# Patient Record
Sex: FEMALE | Race: WHITE | NOT HISPANIC OR LATINO
[De-identification: names, ages, dates, MRNs, and addresses within clinical notes are randomized per-mention and may not be internally consistent; named-entity substitution may affect disease eponyms.]

---

## 2017-03-20 ENCOUNTER — RX RENEWAL (OUTPATIENT)
Age: 82
End: 2017-03-20

## 2017-04-20 ENCOUNTER — APPOINTMENT (OUTPATIENT)
Dept: INTERNAL MEDICINE | Facility: CLINIC | Age: 82
End: 2017-04-20

## 2017-04-20 VITALS
OXYGEN SATURATION: 98 % | WEIGHT: 96 LBS | HEIGHT: 60 IN | DIASTOLIC BLOOD PRESSURE: 80 MMHG | BODY MASS INDEX: 18.85 KG/M2 | SYSTOLIC BLOOD PRESSURE: 130 MMHG | HEART RATE: 79 BPM

## 2017-04-20 DIAGNOSIS — H61.21 IMPACTED CERUMEN, RIGHT EAR: ICD-10-CM

## 2017-04-20 DIAGNOSIS — Z89.422 ACQUIRED ABSENCE OF OTHER LEFT TOE(S): ICD-10-CM

## 2017-04-21 ENCOUNTER — MEDICATION RENEWAL (OUTPATIENT)
Age: 82
End: 2017-04-21

## 2017-04-21 LAB
25(OH)D3 SERPL-MCNC: 12.3 NG/ML
ALBUMIN SERPL ELPH-MCNC: 4 G/DL
ALP BLD-CCNC: 145 U/L
ALT SERPL-CCNC: 30 U/L
ANION GAP SERPL CALC-SCNC: 12 MMOL/L
AST SERPL-CCNC: 29 U/L
BILIRUB SERPL-MCNC: 0.4 MG/DL
BUN SERPL-MCNC: 24 MG/DL
CALCIUM SERPL-MCNC: 9.7 MG/DL
CHLORIDE SERPL-SCNC: 98 MMOL/L
CHOLEST SERPL-MCNC: 133 MG/DL
CHOLEST/HDLC SERPL: 1.5 RATIO
CO2 SERPL-SCNC: 25 MMOL/L
CREAT SERPL-MCNC: 0.7 MG/DL
GLUCOSE SERPL-MCNC: 95 MG/DL
HDLC SERPL-MCNC: 88 MG/DL
LDLC SERPL CALC-MCNC: 34 MG/DL
POTASSIUM SERPL-SCNC: 4.5 MMOL/L
PROT SERPL-MCNC: 7.5 G/DL
SODIUM SERPL-SCNC: 135 MMOL/L
TRIGL SERPL-MCNC: 53 MG/DL

## 2017-06-05 ENCOUNTER — APPOINTMENT (OUTPATIENT)
Dept: PHYSICAL MEDICINE AND REHAB | Facility: CLINIC | Age: 82
End: 2017-06-05

## 2017-06-05 DIAGNOSIS — M48.06 SPINAL STENOSIS, LUMBAR REGION: ICD-10-CM

## 2017-10-19 ENCOUNTER — APPOINTMENT (OUTPATIENT)
Dept: INTERNAL MEDICINE | Facility: CLINIC | Age: 82
End: 2017-10-19
Payer: MEDICARE

## 2017-10-19 VITALS
HEIGHT: 60 IN | DIASTOLIC BLOOD PRESSURE: 60 MMHG | SYSTOLIC BLOOD PRESSURE: 114 MMHG | OXYGEN SATURATION: 96 % | HEART RATE: 76 BPM

## 2017-10-19 PROCEDURE — 90662 IIV NO PRSV INCREASED AG IM: CPT

## 2017-10-19 PROCEDURE — G0008: CPT

## 2017-10-19 PROCEDURE — 99214 OFFICE O/P EST MOD 30 MIN: CPT | Mod: 25

## 2017-10-20 LAB
25(OH)D3 SERPL-MCNC: 10 NG/ML
ALBUMIN SERPL ELPH-MCNC: 3.9 G/DL
ALP BLD-CCNC: 137 U/L
ALT SERPL-CCNC: 24 U/L
ANION GAP SERPL CALC-SCNC: 13 MMOL/L
AST SERPL-CCNC: 24 U/L
BILIRUB SERPL-MCNC: 0.4 MG/DL
BUN SERPL-MCNC: 25 MG/DL
CALCIUM SERPL-MCNC: 9.5 MG/DL
CHLORIDE SERPL-SCNC: 96 MMOL/L
CHOLEST SERPL-MCNC: 134 MG/DL
CHOLEST/HDLC SERPL: 1.5 RATIO
CO2 SERPL-SCNC: 23 MMOL/L
CREAT SERPL-MCNC: 0.69 MG/DL
GLUCOSE SERPL-MCNC: 100 MG/DL
HBA1C MFR BLD HPLC: 5.2 %
HDLC SERPL-MCNC: 91 MG/DL
LDLC SERPL CALC-MCNC: 32 MG/DL
POTASSIUM SERPL-SCNC: 4.1 MMOL/L
PROT SERPL-MCNC: 7.1 G/DL
SODIUM SERPL-SCNC: 132 MMOL/L
TRIGL SERPL-MCNC: 57 MG/DL

## 2018-04-19 ENCOUNTER — APPOINTMENT (OUTPATIENT)
Dept: INTERNAL MEDICINE | Facility: CLINIC | Age: 83
End: 2018-04-19
Payer: MEDICARE

## 2018-04-19 VITALS
DIASTOLIC BLOOD PRESSURE: 60 MMHG | SYSTOLIC BLOOD PRESSURE: 132 MMHG | HEIGHT: 60 IN | OXYGEN SATURATION: 96 % | HEART RATE: 71 BPM

## 2018-04-19 DIAGNOSIS — M21.372 FOOT DROP, RIGHT FOOT: ICD-10-CM

## 2018-04-19 DIAGNOSIS — M21.371 FOOT DROP, RIGHT FOOT: ICD-10-CM

## 2018-04-19 DIAGNOSIS — E78.00 PURE HYPERCHOLESTEROLEMIA, UNSPECIFIED: ICD-10-CM

## 2018-04-19 DIAGNOSIS — Z92.29 PERSONAL HISTORY OF OTHER DRUG THERAPY: ICD-10-CM

## 2018-04-19 DIAGNOSIS — M79.673 PAIN IN UNSPECIFIED FOOT: ICD-10-CM

## 2018-04-19 PROCEDURE — 99213 OFFICE O/P EST LOW 20 MIN: CPT | Mod: 25

## 2018-04-19 PROCEDURE — 36415 COLL VENOUS BLD VENIPUNCTURE: CPT

## 2018-04-20 ENCOUNTER — RX RENEWAL (OUTPATIENT)
Age: 83
End: 2018-04-20

## 2018-04-20 LAB
ALBUMIN SERPL ELPH-MCNC: 3.8 G/DL
ALP BLD-CCNC: 154 U/L
ALT SERPL-CCNC: 26 U/L
ANION GAP SERPL CALC-SCNC: 13 MMOL/L
AST SERPL-CCNC: 28 U/L
BILIRUB SERPL-MCNC: 0.6 MG/DL
BUN SERPL-MCNC: 21 MG/DL
CALCIUM SERPL-MCNC: 9.2 MG/DL
CHLORIDE SERPL-SCNC: 93 MMOL/L
CHOLEST SERPL-MCNC: 148 MG/DL
CHOLEST/HDLC SERPL: 1.6 RATIO
CO2 SERPL-SCNC: 26 MMOL/L
CREAT SERPL-MCNC: 0.69 MG/DL
GLUCOSE SERPL-MCNC: 103 MG/DL
HDLC SERPL-MCNC: 92 MG/DL
LDLC SERPL CALC-MCNC: 45 MG/DL
POTASSIUM SERPL-SCNC: 4.4 MMOL/L
PROT SERPL-MCNC: 7.3 G/DL
SODIUM SERPL-SCNC: 132 MMOL/L
TRIGL SERPL-MCNC: 56 MG/DL

## 2018-05-08 ENCOUNTER — EMERGENCY (EMERGENCY)
Facility: HOSPITAL | Age: 83
LOS: 1 days | Discharge: HOME | End: 2018-05-08
Attending: EMERGENCY MEDICINE
Payer: MEDICARE

## 2018-05-08 VITALS
SYSTOLIC BLOOD PRESSURE: 130 MMHG | HEART RATE: 77 BPM | OXYGEN SATURATION: 96 % | TEMPERATURE: 98 F | RESPIRATION RATE: 16 BRPM | DIASTOLIC BLOOD PRESSURE: 65 MMHG

## 2018-05-08 VITALS
RESPIRATION RATE: 16 BRPM | OXYGEN SATURATION: 98 % | HEART RATE: 84 BPM | SYSTOLIC BLOOD PRESSURE: 156 MMHG | DIASTOLIC BLOOD PRESSURE: 74 MMHG

## 2018-05-08 PROCEDURE — 90471 IMMUNIZATION ADMIN: CPT

## 2018-05-08 PROCEDURE — 72125 CT NECK SPINE W/O DYE: CPT

## 2018-05-08 PROCEDURE — 70450 CT HEAD/BRAIN W/O DYE: CPT | Mod: 26

## 2018-05-08 PROCEDURE — 90715 TDAP VACCINE 7 YRS/> IM: CPT

## 2018-05-08 PROCEDURE — 99284 EMERGENCY DEPT VISIT MOD MDM: CPT | Mod: 25

## 2018-05-08 PROCEDURE — 99284 EMERGENCY DEPT VISIT MOD MDM: CPT | Mod: 25,GC

## 2018-05-08 PROCEDURE — 12002 RPR S/N/AX/GEN/TRNK2.6-7.5CM: CPT

## 2018-05-08 PROCEDURE — 72125 CT NECK SPINE W/O DYE: CPT | Mod: 26

## 2018-05-08 PROCEDURE — 70450 CT HEAD/BRAIN W/O DYE: CPT

## 2018-05-08 PROCEDURE — 12002 RPR S/N/AX/GEN/TRNK2.6-7.5CM: CPT | Mod: GC

## 2018-05-08 RX ORDER — TETANUS TOXOID, REDUCED DIPHTHERIA TOXOID AND ACELLULAR PERTUSSIS VACCINE, ADSORBED 5; 2.5; 8; 8; 2.5 [IU]/.5ML; [IU]/.5ML; UG/.5ML; UG/.5ML; UG/.5ML
0.5 SUSPENSION INTRAMUSCULAR ONCE
Qty: 0 | Refills: 0 | Status: COMPLETED | OUTPATIENT
Start: 2018-05-08 | End: 2018-05-08

## 2018-05-08 RX ADMIN — TETANUS TOXOID, REDUCED DIPHTHERIA TOXOID AND ACELLULAR PERTUSSIS VACCINE, ADSORBED 0.5 MILLILITER(S): 5; 2.5; 8; 8; 2.5 SUSPENSION INTRAMUSCULAR at 13:26

## 2018-05-08 NOTE — ED PROCEDURE NOTE - ATTENDING CONTRIBUTION TO CARE
***Binh Prado MD*** Attending physician was available for the key components of the procedure, the patient tolerated well. There were no complications with the procedure.

## 2018-05-08 NOTE — ED ADULT NURSE NOTE - PMH
Cataract    Cerebrovascular Accident (Stroke)    Hip fracture    HTN (Hypertension)    Wrist fracture

## 2018-05-08 NOTE — ED PROVIDER NOTE - MEDICAL DECISION MAKING DETAILS
89F p/w mechanical fall. no concern for syncopal episode. only c/o headache and laceration. unknown last tdap. will update, scan head and neck and lac repair. 89F p/w mechanical fall. no concern for syncopal episode. only c/o headache and laceration. unknown last tdap. will update, scan head and neck and lac repair.    Dr. Moser: Female patient with past hx of HTN, brought to ED due to head trauma following mechanical fall without associated LOC, resulting in occipital scalp laceration. Patient found in no acute distress, calm, speaking in complete sentences, with no gross neurologic deficits. Imaging studies of head and neck ordered to assess for fractures/dislocations, intracranial bleed. CT's revealed no emergent findings. Patient lacerations were stapled successfully at ED, with patient tolerating treatment well. Tetanus immunization to be administered at ED. Recommendations for rest, proper wound hygiene with soap and water, return to ED or follow-up with PMD in 7-10 days for wound recheck ans suture removal were given to patient. Will discharge home.

## 2018-05-08 NOTE — ED ADULT NURSE NOTE - OBJECTIVE STATEMENT
89 year old female presents to the ED via EMS complaining of a 2cm laceration to the back of the head s/p a fall at home. As per patient she got up, trunked too fast and fell backwards hitting her head on the carpet. The patient arrived with her head wrapped by EMS. Pt denies LOC, is not on blood thinners. Pt is A&O x 2, oriented to situation and day, disoriented to year and president. Patient states that she remembers everything, denies nausea or visual changes. on neurological assessment the pt is neurologically and neurosensory intact, strong use of all extremities, pt denies neck pain. IV placed, labs drawn, bed in low position, safety measures in place.

## 2018-05-08 NOTE — ED PROVIDER NOTE - OBJECTIVE STATEMENT
Monique Gaston M.D: 89F hx htn BIBEMS following mechanical fall. got up from sitting and turned around and lost her balance and fell backwards. no LOC. able to get up afterwards and walk. no other injuries other than alceration to scalp. no cp sob dizziness lightheadedness Monique Gaston M.D: 89F hx htn BIBEMS following mechanical fall. got up from sitting and turned around and lost her balance and fell backwards. no LOC. able to get up afterwards and walk. no other injuries other than alceration to scalp. no cp sob dizziness lightheadedness. feels well other than small headache.

## 2018-05-08 NOTE — ED PROVIDER NOTE - PROGRESS NOTE DETAILS
Patient evaluation and care begun prior to documentation. Patient was evaluated by me, found in no acute distress, calm, speaking in complete sentences. CT's revealed no emergent findings. Patient lacerations were stapled successfully at ED, with patient tolerating treatment well. Tetanus immunization to be administered at ED. Recommendations for rest, proper wound hygiene with soap and water, return to ED or follow-up with PMD in 7-10 days for wound recheck ans suture removal were given to patient. Will discharge home. I agree with resident assessment and plan. -Dr. Binh Moser

## 2018-05-08 NOTE — ED PROVIDER NOTE - PHYSICAL EXAMINATION
Monique Gaston M.D.:   patient awake alert NAD .   LUNGS CTAB no wheeze no crackle.   CARD RRR no m/r/g.    Abdomen soft NT ND no rebound no guarding no CVA tenderness.   EXT WWP no edema no calf tenderness CV 2+DP/PT bilaterally.   neuro A&Ox3 gait normal.    skin warm and dry no rash 2 cm linear laceration to posterior occiput still bleeding.  HEENT: moist mucous membranes, PERRL, EOMI

## 2018-05-17 ENCOUNTER — EMERGENCY (EMERGENCY)
Facility: HOSPITAL | Age: 83
LOS: 1 days | Discharge: HOME | End: 2018-05-17
Attending: EMERGENCY MEDICINE
Payer: MEDICARE

## 2018-05-17 VITALS
SYSTOLIC BLOOD PRESSURE: 146 MMHG | RESPIRATION RATE: 16 BRPM | DIASTOLIC BLOOD PRESSURE: 78 MMHG | HEIGHT: 56 IN | WEIGHT: 104.94 LBS | OXYGEN SATURATION: 97 % | TEMPERATURE: 98 F | HEART RATE: 78 BPM

## 2018-05-17 PROCEDURE — G0463: CPT

## 2018-05-17 NOTE — ED PROVIDER NOTE - OBJECTIVE STATEMENT
89 y/o female presents 8 days s/p staple placement at [facility] ED in scalp for lac after fall and head trauma. Pt denies pain, pruritus, erythema, bleeding, DC, or irritation of the staple site. Pt has no complaints today, denies pain, dizziness, confusion, AMS, change in vision, recent fevers, chills, or other recent illness.

## 2018-05-17 NOTE — ED PROVIDER NOTE - CHIEF COMPLAINT
The patient is a 89y Female complaining of The patient is a 89y Female complaining of need for staple removal

## 2018-05-17 NOTE — ED PROVIDER NOTE - PHYSICAL EXAMINATION
Pt in NAD, A&O x3. CN 2-12 grossly intact. 7 staples present in scalp with underlying scab, no discharge, erythema, edema, or active bleeding, minimally tender to palpation over staple site. Otherwise NCAT. R with light blue iris and cloudy appearing cornea, non-tender, unable to see out of R eye pt states no change from baseline c/w prior corneal replacement surgery, L eye PERRLA, EOMI b/l. Nares patent, turbinates without edema or erythema, no polyps or septal deviation. No auricular tenderness. Mouth and pharynx without erythema or exudates, uvula midline, no tonsillar enlargement. Trachea midline, no lymphadenopathy, no obvious JVD. No retractions or chest wall deformities. No chest wall tenderness, CTA anterior and posterior b/l, no wheezes, rales, or rhonchi. Clear distinct S1, S2, regular rate, no S3, S4, murmurs, gallops, or rubs. Abdomen non-tender. No CVAT b/l. No edema or tenderness of the lower extremities.

## 2018-05-17 NOTE — ED PROVIDER NOTE - ATTENDING CONTRIBUTION TO CARE
89y feamale sp mechanical trip and fall 8d ago sustained scalp lac and now pw req for sr, No ha,nvdc.cp,sob PE WDWN female looking stated age healing wound/post scalp. nontender with #7 staples, Neuro gcs 15 awake alert moving all extr  Arnulfo Castellano MD, Facep

## 2018-05-17 NOTE — ED PROVIDER NOTE - PLAN OF CARE
Follow-up with your primary care provider or return to ED if any questions arise.  Continue to take all medications as directed.  Gently wash area with soap and warm water.  Return to the emergency room immediately if affected area becomes painful or irritated, if there is discharge or increased bleeding from the area, or if you develop fever, chills, headache, nausea, vomiting, change in vision, numbness or tingling, difficulty walking, talking, eating or swallowing, chest pain, back pain, or if any other concerning or questionable symptoms.

## 2018-05-17 NOTE — ED PROCEDURE NOTE - ATTENDING CONTRIBUTION TO CARE
Staples removed without difficulty, Wound no sign of infection.Pt tolerated procedure well  Arnulfo Castellano MD, Facep

## 2018-05-17 NOTE — ED ADULT NURSE NOTE - OBJECTIVE STATEMENT
0945 89 yr old WF here for staple removal scalp. No c/o pain dizziness or HA. home health aide with pt. Pt A&Ox4. Fall risk precautions maintained 0945 89 yr old WF here for staple removal scalp. No c/o pain dizziness or HA. home health aide with pt. Pt A&Ox3. not sure of year. Fall risk precautions maintained. [place] removed by  Healing ecchymosis right periorbital region

## 2018-05-17 NOTE — ED PROVIDER NOTE - CARE PLAN
Principal Discharge DX:	Removal of staples  Assessment and plan of treatment:	Follow-up with your primary care provider or return to ED if any questions arise.  Continue to take all medications as directed.  Gently wash area with soap and warm water.  Return to the emergency room immediately if affected area becomes painful or irritated, if there is discharge or increased bleeding from the area, or if you develop fever, chills, headache, nausea, vomiting, change in vision, numbness or tingling, difficulty walking, talking, eating or swallowing, chest pain, back pain, or if any other concerning or questionable symptoms.

## 2018-08-27 ENCOUNTER — EMERGENCY (EMERGENCY)
Facility: HOSPITAL | Age: 83
LOS: 1 days | Discharge: HOME | End: 2018-08-27
Attending: EMERGENCY MEDICINE
Payer: MEDICARE

## 2018-08-27 VITALS
SYSTOLIC BLOOD PRESSURE: 131 MMHG | WEIGHT: 115.08 LBS | TEMPERATURE: 99 F | OXYGEN SATURATION: 95 % | RESPIRATION RATE: 18 BRPM | HEART RATE: 78 BPM | HEIGHT: 60 IN | DIASTOLIC BLOOD PRESSURE: 74 MMHG

## 2018-08-27 VITALS
HEART RATE: 65 BPM | RESPIRATION RATE: 18 BRPM | OXYGEN SATURATION: 94 % | DIASTOLIC BLOOD PRESSURE: 80 MMHG | TEMPERATURE: 98 F | SYSTOLIC BLOOD PRESSURE: 157 MMHG

## 2018-08-27 DIAGNOSIS — M26.609 UNSPECIFIED TEMPOROMANDIBULAR JOINT DISORDER, UNSPECIFIED SIDE: ICD-10-CM

## 2018-08-27 LAB
ALBUMIN SERPL ELPH-MCNC: 4.2 G/DL — SIGNIFICANT CHANGE UP (ref 3.3–5)
ALP SERPL-CCNC: 153 U/L — HIGH (ref 40–120)
ALT FLD-CCNC: 15 U/L — SIGNIFICANT CHANGE UP (ref 10–45)
ANION GAP SERPL CALC-SCNC: 12 MMOL/L — SIGNIFICANT CHANGE UP (ref 5–17)
AST SERPL-CCNC: 17 U/L — SIGNIFICANT CHANGE UP (ref 10–40)
BASOPHILS # BLD AUTO: 0 K/UL — SIGNIFICANT CHANGE UP (ref 0–0.2)
BASOPHILS NFR BLD AUTO: 0.2 % — SIGNIFICANT CHANGE UP (ref 0–2)
BILIRUB SERPL-MCNC: 0.6 MG/DL — SIGNIFICANT CHANGE UP (ref 0.2–1.2)
BUN SERPL-MCNC: 19 MG/DL — SIGNIFICANT CHANGE UP (ref 7–23)
CALCIUM SERPL-MCNC: 9.4 MG/DL — SIGNIFICANT CHANGE UP (ref 8.4–10.5)
CHLORIDE SERPL-SCNC: 92 MMOL/L — LOW (ref 96–108)
CO2 SERPL-SCNC: 28 MMOL/L — SIGNIFICANT CHANGE UP (ref 22–31)
CREAT SERPL-MCNC: 0.62 MG/DL — SIGNIFICANT CHANGE UP (ref 0.5–1.3)
EOSINOPHIL # BLD AUTO: 0.1 K/UL — SIGNIFICANT CHANGE UP (ref 0–0.5)
EOSINOPHIL NFR BLD AUTO: 0.5 % — SIGNIFICANT CHANGE UP (ref 0–6)
ERYTHROCYTE [SEDIMENTATION RATE] IN BLOOD: 17 MM/HR — SIGNIFICANT CHANGE UP (ref 0–20)
GLUCOSE SERPL-MCNC: 112 MG/DL — HIGH (ref 70–99)
HCT VFR BLD CALC: 44 % — SIGNIFICANT CHANGE UP (ref 34.5–45)
HGB BLD-MCNC: 14.1 G/DL — SIGNIFICANT CHANGE UP (ref 11.5–15.5)
LYMPHOCYTES # BLD AUTO: 0.9 K/UL — LOW (ref 1–3.3)
LYMPHOCYTES # BLD AUTO: 9.8 % — LOW (ref 13–44)
MCHC RBC-ENTMCNC: 30 PG — SIGNIFICANT CHANGE UP (ref 27–34)
MCHC RBC-ENTMCNC: 32.1 GM/DL — SIGNIFICANT CHANGE UP (ref 32–36)
MCV RBC AUTO: 93.3 FL — SIGNIFICANT CHANGE UP (ref 80–100)
MONOCYTES # BLD AUTO: 1.2 K/UL — HIGH (ref 0–0.9)
MONOCYTES NFR BLD AUTO: 12.9 % — SIGNIFICANT CHANGE UP (ref 2–14)
NEUTROPHILS # BLD AUTO: 7.3 K/UL — SIGNIFICANT CHANGE UP (ref 1.8–7.4)
NEUTROPHILS NFR BLD AUTO: 76.5 % — SIGNIFICANT CHANGE UP (ref 43–77)
PLATELET # BLD AUTO: 151 K/UL — SIGNIFICANT CHANGE UP (ref 150–400)
POTASSIUM SERPL-MCNC: 4.1 MMOL/L — SIGNIFICANT CHANGE UP (ref 3.5–5.3)
POTASSIUM SERPL-SCNC: 4.1 MMOL/L — SIGNIFICANT CHANGE UP (ref 3.5–5.3)
PROT SERPL-MCNC: 7.8 G/DL — SIGNIFICANT CHANGE UP (ref 6–8.3)
RBC # BLD: 4.71 M/UL — SIGNIFICANT CHANGE UP (ref 3.8–5.2)
RBC # FLD: 13.3 % — SIGNIFICANT CHANGE UP (ref 10.3–14.5)
SODIUM SERPL-SCNC: 132 MMOL/L — LOW (ref 135–145)
WBC # BLD: 9.5 K/UL — SIGNIFICANT CHANGE UP (ref 3.8–10.5)
WBC # FLD AUTO: 9.5 K/UL — SIGNIFICANT CHANGE UP (ref 3.8–10.5)

## 2018-08-27 PROCEDURE — 85652 RBC SED RATE AUTOMATED: CPT

## 2018-08-27 PROCEDURE — 99284 EMERGENCY DEPT VISIT MOD MDM: CPT

## 2018-08-27 PROCEDURE — 70491 CT SOFT TISSUE NECK W/DYE: CPT | Mod: 26

## 2018-08-27 PROCEDURE — 70491 CT SOFT TISSUE NECK W/DYE: CPT

## 2018-08-27 PROCEDURE — 99284 EMERGENCY DEPT VISIT MOD MDM: CPT | Mod: 25

## 2018-08-27 PROCEDURE — 85027 COMPLETE CBC AUTOMATED: CPT

## 2018-08-27 PROCEDURE — 80053 COMPREHEN METABOLIC PANEL: CPT

## 2018-08-27 RX ORDER — ACETAMINOPHEN 500 MG
975 TABLET ORAL ONCE
Qty: 0 | Refills: 0 | Status: COMPLETED | OUTPATIENT
Start: 2018-08-27 | End: 2018-08-27

## 2018-08-27 RX ORDER — SODIUM CHLORIDE 9 MG/ML
1000 INJECTION, SOLUTION INTRAVENOUS
Qty: 0 | Refills: 0 | Status: DISCONTINUED | OUTPATIENT
Start: 2018-08-27 | End: 2018-08-31

## 2018-08-27 RX ADMIN — Medication 975 MILLIGRAM(S): at 11:35

## 2018-08-27 RX ADMIN — Medication 975 MILLIGRAM(S): at 10:47

## 2018-08-27 NOTE — ED PROVIDER NOTE - PROGRESS NOTE DETAILS
Attending MD Short: pt seen by ENT attending Dr. Oneil, she does not suspect septic arthritis. ?inflammatory arthropathy involving TMJ. She recommends warm compresses, PO analgesics and outpatient oral surgery follow up

## 2018-08-27 NOTE — ED ADULT NURSE NOTE - OBJECTIVE STATEMENT
89 y/o female presents to ED from home c/o L sided facial pain for the past year. Patient's daughter reports patient states pain has been constant, and since today she is unable to chew her food. Denies fever/chills, falls/LOC, SOB, cp, n/v/d, recent travel. Patient A&Ox4, breathing spontaneously, airway patent, b/l clear lungs, abdomen nontender, +pulses, cap refill <2 seconds. Patient resting in bed, side rails up, plan of care explained.

## 2018-08-27 NOTE — CONSULT NOTE ADULT - ASSESSMENT
89 yo F c/o pain in the left side of her face for the past year on and off, worsening since yesterday. Neck Ct showed a fluid collection at the TMJ. No abscess. Ear exam is normal. ESR and WBC nl. Unlikely infectious

## 2018-08-27 NOTE — ED PROVIDER NOTE - OBJECTIVE STATEMENT
89 yo F c/o pain in the left side of her face for the past year. Reports the pain as being constant and was unable to chew her food today. Denies any fevers. Might be allergic to penicillin. Pt's daughter reports her stomach is sensitive to medication and often gets diarrhea.     Dr. Raj Gonzales (Internist) [address] [place] [address] Dr Cruz [address] [place] [address]  [phone] 89 yo F c/o pain in the left side of her face for the past year. Reports the pain as being constant and was unable to chew her food today. Denies any fevers. Might be allergic to penicillin. Pt's daughter reports her stomach is sensitive to medication and often gets diarrhea. On exam, pt was A&Ox3 and hard of hearing. She has grayish discoloration of her R eye, her L eye was equal round and reactive to light. She has a symmetric smile, and localized tenderness of her preauricular area, no mastoid tenderness BL, no Bl cerumen tenderness, no odontogenic abscess, no trismus, neck is supple and non-tender. Her heart, lungs, abd, neuro, and skin exam findings are all normal.     Dr. Raj Gonzales (Internist) [address] [place] [address] Nancy [address] [place] [id number]  [phone]

## 2018-08-27 NOTE — ED PROVIDER NOTE - PLAN OF CARE
You may use Tylenol 650mg every 8 hours as needed for pain. You should apply a warm towel to the area 4 times per day for 20 minutes to help with the swelling.     Please call  to schedule a follow up appointment with Dr. Hopper our specialist in 1-2 weeks.    Return immediately to the ED for worsening pain, fevers (temperature over 101F) or for any other concerns.

## 2018-08-27 NOTE — ED PROVIDER NOTE - PHYSICAL EXAMINATION
focal localized tenderness and mild swelling of preauricular area on left, no mastoid tenderness BL, no trismus. no tongue elevation, uvula midline, nontender teeth

## 2018-08-27 NOTE — ED PROVIDER NOTE - MEDICAL DECISION MAKING DETAILS
Attending MD Short: 89F with acute on chronic left jaw and facial pain x 4-5 months, exam is notable for localizing mild swelling and ttp in pre-auricular area. Ddx includes parotitis from sialoadenitis, parotid cyst/mass, trigeminal neuralgia. Less likely temporal arteritis as pain is not localized to temporal area and no temporal ttp however will obtain ESR to eval. CT neck to ro parotid collection or mass.

## 2018-08-27 NOTE — CONSULT NOTE ADULT - ATTENDING COMMENTS
left TMJ pain with joint effusion. low suspicion for septic arthritis given the chronicity of symptoms, lack of wbc, normal esr, and lack of systemic symptoms. significant improvement with tylenol and patient with normal range of motion on jaw opening. recommend f/u with oral surgeon this week to eval further. if pain worsens, any fever/chills/systemic symptoms, inability to chew, increase in redness/swelling, then f/u in ED immediately. d/w daughter and patient.

## 2018-08-27 NOTE — CONSULT NOTE ADULT - SUBJECTIVE AND OBJECTIVE BOX
CC: Left facial pain    HPI: 89 yo F c/o pain in the left side of her face for the past year on and off. Reports the pain as being constant and was unable to chew her food today. Pt states the pain radiates to her left ear. Pt received tylenol in the ER and felt much better after. Pt admits to B/L hearing loss, but denies ear discharge, SOB, fevers, HAs.      PAST MEDICAL & SURGICAL HISTORY:  Wrist fracture  Hip fracture  HTN (Hypertension)  Cerebrovascular Accident (Stroke)  Cataract  Status Post Small Bowel Resection  C Section  S/P Exploratory Laparotomy    Allergies    No Known Allergies    Intolerances    penicillin (Stomach Upset)    MEDICATIONS  (STANDING):  lactated ringers. 1000 milliLiter(s) (70 mL/Hr) IV Continuous <Continuous>    MEDICATIONS  (PRN):      Social History:     Family history:     ROS:   ENT: all negative except as noted in HPI   CV: denies palpitations  Pulm: denies SOB, cough, hemoptysis  GI: denies change in apetite, indigestion, n/v  : denies pertinent urinary symptoms, urgency  Neuro: denies numbness/tingling, loss of sensation  Psych: denies anxiety  MS: denies muscle weakness, instability  Heme: denies easy bruising or bleeding  Endo: denies heat/cold intolerance, excessive sweating  Vascular: denies LE edema    Vital Signs Last 24 Hrs  T(C): 36.7 (27 Aug 2018 16:46), Max: 37 (27 Aug 2018 10:12)  T(F): 98 (27 Aug 2018 16:46), Max: 98.6 (27 Aug 2018 10:12)  HR: 65 (27 Aug 2018 16:46) (65 - 78)  BP: 157/80 (27 Aug 2018 16:46) (131/74 - 164/70)  BP(mean): --  RR: 18 (27 Aug 2018 16:46) (18 - 18)  SpO2: 94% (27 Aug 2018 16:46) (94% - 95%)                          14.1   9.5   )-----------( 151      ( 27 Aug 2018 10:59 )             44.0    08-27    132<L>  |  92<L>  |  19  ----------------------------<  112<H>  4.1   |  28  |  0.62    Ca    9.4      27 Aug 2018 10:59    TPro  7.8  /  Alb  4.2  /  TBili  0.6  /  DBili  x   /  AST  17  /  ALT  15  /  AlkPhos  153<H>  08-27       PHYSICAL EXAM:  Gen: NAD  Skin: No rashes, bruises, or lesions  Head: Normocephalic, Atraumatic  Face: Mild left pre-auricular swelling, TTP, erythematous, no fluctuance. Parotid glands soft without mass  Eyes: no scleral injection  Ears: Right - ear canal clear, TM intact without effusion or erythema. No evidence of any fluid drainage. No mastoid tenderness, erythema, or ear bulging            Left - ear canal clear, TM intact without effusion or erythema. No evidence of any fluid drainage. No mastoid tenderness, erythema, or ear bulging  Nose: Nares bilaterally patent, no discharge  Mouth: No Stridor / Drooling / Trismus.  Mucosa moist, tongue/uvula midline, oropharynx clear  Neck: Flat, supple, no lymphadenopathy, trachea midline, no masses  Lymphatic: No lymphadenopathy  Resp: breathing easily, no stridor  CV: no peripheral edema/cyanosis  GI: nondistended   Peripheral vascular: no JVD or edema  Neuro: facial nerve intact, no facial droop                    IMAGING/ADDITIONAL STUDIES: Nonspecific fluid is noted within the left temporal mandibular joint. The   left condylar head is subluxed anteriorly. Some considerations include a   joint effusion versus a septic arthritis. These changes appear new   compared to 05/08/2018 cervical spine CT study. CC: Left facial pain    HPI: 89 yo F c/o pain in the left side of her face for the past year on and off. Reports the pain as being constant and was unable to chew her food today. Pt states the pain radiates to her left ear. Pt received tylenol in the ER and felt much better after. Pt admits to B/L hearing loss, but denies ear discharge, SOB, fevers, HAs.      PAST MEDICAL & SURGICAL HISTORY:  Wrist fracture  Hip fracture  HTN (Hypertension)  Cerebrovascular Accident (Stroke)  Cataract  Status Post Small Bowel Resection  C Section  S/P Exploratory Laparotomy    Allergies    No Known Allergies    Intolerances    penicillin (Stomach Upset)    MEDICATIONS  (STANDING):  lactated ringers. 1000 milliLiter(s) (70 mL/Hr) IV Continuous <Continuous>    MEDICATIONS  (PRN):      Social History: Unknown    Family history: No pertinent Fx    ROS:   ENT: all negative except as noted in HPI   CV: denies palpitations  Pulm: denies SOB, cough, hemoptysis  GI: denies change in apetite, indigestion, n/v  : denies pertinent urinary symptoms, urgency  Neuro: denies numbness/tingling, loss of sensation  Psych: denies anxiety  MS: denies muscle weakness, instability  Heme: denies easy bruising or bleeding  Endo: denies heat/cold intolerance, excessive sweating  Vascular: denies LE edema    Vital Signs Last 24 Hrs  T(C): 36.7 (27 Aug 2018 16:46), Max: 37 (27 Aug 2018 10:12)  T(F): 98 (27 Aug 2018 16:46), Max: 98.6 (27 Aug 2018 10:12)  HR: 65 (27 Aug 2018 16:46) (65 - 78)  BP: 157/80 (27 Aug 2018 16:46) (131/74 - 164/70)  BP(mean): --  RR: 18 (27 Aug 2018 16:46) (18 - 18)  SpO2: 94% (27 Aug 2018 16:46) (94% - 95%)                          14.1   9.5   )-----------( 151      ( 27 Aug 2018 10:59 )             44.0    08-27    132<L>  |  92<L>  |  19  ----------------------------<  112<H>  4.1   |  28  |  0.62    Ca    9.4      27 Aug 2018 10:59    TPro  7.8  /  Alb  4.2  /  TBili  0.6  /  DBili  x   /  AST  17  /  ALT  15  /  AlkPhos  153<H>  08-27       PHYSICAL EXAM:  Gen: NAD  Skin: No rashes, bruises, or lesions  Head: Normocephalic, Atraumatic  Face: Mild left pre-auricular swelling, TTP, erythematous, no fluctuance. Parotid glands soft without mass  Eyes: no scleral injection  Ears: Right - ear canal clear, TM intact without effusion or erythema. No evidence of any fluid drainage. No mastoid tenderness, erythema, or ear bulging            Left - ear canal clear, TM intact without effusion or erythema. No evidence of any fluid drainage. No mastoid tenderness, erythema, or ear bulging  Nose: Nares bilaterally patent, no discharge  Mouth: No Stridor / Drooling / Trismus.  Mucosa moist, tongue/uvula midline, oropharynx clear  Neck: Flat, supple, no lymphadenopathy, trachea midline, no masses  Lymphatic: No lymphadenopathy  Resp: breathing easily, no stridor  CV: no peripheral edema/cyanosis  GI: nondistended   Peripheral vascular: no JVD or edema  Neuro: facial nerve intact, no facial droop                    IMAGING/ADDITIONAL STUDIES: Nonspecific fluid is noted within the left temporal mandibular joint. The   left condylar head is subluxed anteriorly. Some considerations include a   joint effusion versus a septic arthritis. These changes appear new   compared to 05/08/2018 cervical spine CT study. CC: Left facial pain    HPI: 89 yo F c/o pain in the left side of her face for the past year on and off. Reports the pain as being constant and was unable to chew her food today. Pt states the pain radiates to her left ear. Pt received tylenol in the ER and felt much better after. Pt admits to B/L hearing loss, but denies ear discharge, SOB, fevers, HAs. Notes the pain has worsened the past few months. SHe did not try any pain relievers at home since she wasnt sure why it was hurting. no other modifying factors.    PAST MEDICAL & SURGICAL HISTORY:  Wrist fracture  Hip fracture  HTN (Hypertension)  Cerebrovascular Accident (Stroke)  Cataract  Status Post Small Bowel Resection  C Section  S/P Exploratory Laparotomy    Allergies    No Known Allergies    Intolerances    penicillin (Stomach Upset)    MEDICATIONS  (STANDING):  lactated ringers. 1000 milliLiter(s) (70 mL/Hr) IV Continuous <Continuous>    MEDICATIONS  (PRN):      Social History: no tobacco, no etoh    Family history: No pertinent Fx    ROS:   ENT: all negative except as noted in HPI   CV: denies palpitations  Pulm: denies SOB, cough, hemoptysis  GI: denies change in apetite, indigestion, n/v  : denies pertinent urinary symptoms, urgency  Neuro: denies numbness/tingling, loss of sensation  Psych: denies anxiety  MS: denies muscle weakness, instability  Heme: denies easy bruising or bleeding  Endo: denies heat/cold intolerance, excessive sweating  Vascular: denies LE edema    Vital Signs Last 24 Hrs  T(C): 36.7 (27 Aug 2018 16:46), Max: 37 (27 Aug 2018 10:12)  T(F): 98 (27 Aug 2018 16:46), Max: 98.6 (27 Aug 2018 10:12)  HR: 65 (27 Aug 2018 16:46) (65 - 78)  BP: 157/80 (27 Aug 2018 16:46) (131/74 - 164/70)  BP(mean): --  RR: 18 (27 Aug 2018 16:46) (18 - 18)  SpO2: 94% (27 Aug 2018 16:46) (94% - 95%)                          14.1   9.5   )-----------( 151      ( 27 Aug 2018 10:59 )             44.0    08-27    132<L>  |  92<L>  |  19  ----------------------------<  112<H>  4.1   |  28  |  0.62    Ca    9.4      27 Aug 2018 10:59    TPro  7.8  /  Alb  4.2  /  TBili  0.6  /  DBili  x   /  AST  17  /  ALT  15  /  AlkPhos  153<H>  08-27       PHYSICAL EXAM:  Gen: NAD  Skin: No rashes, bruises, or lesions  Head: Normocephalic, Atraumatic  Face: Mild left pre-auricular swelling with faint erythema, TTP over joint, no fluctuance. Parotid glands soft without tenderness  Eyes: no scleral injection  Ears: Right - ear canal clear, TM intact without effusion or erythema. No evidence of any fluid drainage. No mastoid tenderness, erythema, or ear bulging            Left - ear canal clear, TM intact without effusion or erythema. No evidence of any fluid drainage. No mastoid tenderness, erythema, or ear bulging  Nose: Nares bilaterally patent, no discharge  Mouth: No Stridor / Drooling / Trismus.  Mucosa moist, tongue/uvula midline, oropharynx clear  Neck: Flat, supple, no lymphadenopathy, trachea midline, no masses  Lymphatic: No lymphadenopathy  Resp: breathing easily, no stridor  CV: no peripheral edema/cyanosis  GI: nondistended   Peripheral vascular: no JVD or edema  Neuro: facial nerve intact, no facial droop              IMAGING/ADDITIONAL STUDIES: Nonspecific fluid is noted within the left temporal mandibular joint. The   left condylar head is subluxed anteriorly. Some considerations include a   joint effusion versus a septic arthritis. These changes appear new   compared to 05/08/2018 cervical spine CT study.

## 2018-08-27 NOTE — ED ADULT NURSE REASSESSMENT NOTE - NS ED NURSE REASSESS COMMENT FT1
Provided patient with warm compress and instructed patient to apply to L side of face. Patient states "I do not have pain", left warm pack with patient.

## 2018-08-27 NOTE — ED ADULT NURSE NOTE - NSIMPLEMENTINTERV_GEN_ALL_ED
Implemented All Fall with Harm Risk Interventions:  [place] to call system. Call bell, personal items and telephone within reach. Instruct patient to call for assistance. Room bathroom lighting operational. Non-slip footwear when patient is off stretcher. Physically safe environment: no spills, clutter or unnecessary equipment. Stretcher in lowest position, wheels locked, appropriate side rails in place. Provide visual cue, wrist band, yellow gown, etc. Monitor gait and stability. Monitor for mental status changes and reorient to person, place, and time. Review medications for side effects contributing to fall risk. Reinforce activity limits and safety measures with patient and family. Provide visual clues: red socks.

## 2018-08-27 NOTE — ED PROVIDER NOTE - ENMT, MLM
Hard of hearing, symmetric smile, Neck is supple and non-tender. No cerumen BL, no odontogenic abscess, no trismus. Hard of hearing, symmetric smile, Neck is supple and non-tender. cerumen impaction bilaterally, unable to visualize Tm b/l, no odontogenic abscess, no trismus.

## 2018-10-01 ENCOUNTER — MEDICATION RENEWAL (OUTPATIENT)
Age: 83
End: 2018-10-01

## 2018-10-01 ENCOUNTER — RX RENEWAL (OUTPATIENT)
Age: 83
End: 2018-10-01

## 2018-10-25 ENCOUNTER — APPOINTMENT (OUTPATIENT)
Dept: INTERNAL MEDICINE | Facility: CLINIC | Age: 83
End: 2018-10-25
Payer: MEDICARE

## 2018-10-25 VITALS
SYSTOLIC BLOOD PRESSURE: 110 MMHG | OXYGEN SATURATION: 97 % | HEART RATE: 72 BPM | DIASTOLIC BLOOD PRESSURE: 70 MMHG | WEIGHT: 96 LBS | BODY MASS INDEX: 18.85 KG/M2 | HEIGHT: 60 IN

## 2018-10-25 PROCEDURE — 90662 IIV NO PRSV INCREASED AG IM: CPT

## 2018-10-25 PROCEDURE — G0008: CPT

## 2018-10-25 PROCEDURE — 99214 OFFICE O/P EST MOD 30 MIN: CPT | Mod: 25

## 2018-10-25 NOTE — ASSESSMENT
[FreeTextEntry1] : \par Flu shot today.\par \par Cholesterol stable, well controlled, on statin. Due for repeat lipid and CMP check today with labs.

## 2018-10-25 NOTE — REASON FOR VISIT
[Follow-Up] : a follow-up visit [Pre-Visit Preparation] : pre-visit preparation was done [Intercurrent Specialty/Sub-specialty Visits] : the patient has intercurrent specialty/sub-specialty visits [FreeTextEntry3] : PM&R

## 2018-10-25 NOTE — PHYSICAL EXAM
[General Appearance - Alert] : alert [General Appearance - In No Acute Distress] : in no acute distress [Auscultation Breath Sounds / Voice Sounds] : lungs were clear to auscultation bilaterally [Heart Rate And Rhythm] : heart rate was normal and rhythm regular [Heart Sounds] : normal S1 and S2 [Heart Sounds Gallop] : no gallops [Murmurs] : no murmurs [Heart Sounds Pericardial Friction Rub] : no pericardial rub [Full Pulse] : the pedal pulses are present [Edema] : there was no peripheral edema [Bowel Sounds] : normal bowel sounds [Abdomen Soft] : soft [Abdomen Tenderness] : non-tender [] : no hepato-splenomegaly [Abdomen Mass (___ Cm)] : no abdominal mass palpated [Abnormal Walk] : normal gait [Involuntary Movements] : no involuntary movements were seen [Motor Tone] : muscle strength and tone were normal [Oriented To Time, Place, And Person] : oriented to person, place, and time [Impaired Insight] : insight and judgment were intact [Affect] : the affect was normal [FreeTextEntry1] : FROM of both feet, ankles appreciated

## 2018-10-25 NOTE — DISCUSSION/SUMMARY
[Frailty-weight loss of >5%] : frailty-weight loss  [Fall precautions] : fall precautions [Social support] : social support [Living arrangements] : living arrangements [Lab Results] : lab results [Medication Management] : medication management [Non - Smoker] : non-smoker [Hypercholesterolemia] : Hypercholesterolemia [None] : no [Well Controlled] : well controlled [<70] : <70 [>40] : >40 [<130] : <130 [At Goal] : at goal [<80] : <80 [No Changes] : no changes in medication

## 2018-10-25 NOTE — HISTORY OF PRESENT ILLNESS
[Hyperlipidemia] : hyperlipidemia [Doing Well] : doing well [Compliant] : the patient is compliant with ~his/her~ medication regimen [Statin] : a statin [None] : The patient is currently asymptomatic [Healthy Diet] : ~He/She~ consumes a diverse and healthy diet [Due For:] : the patient is due for [Lipid Panel] : a lipid panel [Creatinine] : a serum creatinine [LFTs] : liver function tests [Side Effects] : ~He/She~ denies medication side effects [Polyuria] : no polyuria [Polydypsia] : no polydipsia [Wt Gain ___ Lbs] : no [unfilled] lb weight gain [Wt Loss ___ Lbs] : no [unfilled] lb  weight loss [Headache] : no headache [Weight Concerns] : ~He/She~ does not have any weight concerns [Regular Exercise] : ~He/She~ does not exercise regularly [Tobacco Use] : ~He/She~ does not use tobacco [Alcohol Use] : ~He/She~ denies alcohol use [Drug Abuse] : ~He/She~ denies drug use

## 2018-10-26 LAB
ALBUMIN SERPL ELPH-MCNC: 4 G/DL
ALP BLD-CCNC: 139 U/L
ALT SERPL-CCNC: 14 U/L
ANION GAP SERPL CALC-SCNC: 11 MMOL/L
AST SERPL-CCNC: 18 U/L
BASOPHILS # BLD AUTO: 0.02 K/UL
BASOPHILS NFR BLD AUTO: 0.4 %
BILIRUB SERPL-MCNC: 0.8 MG/DL
BUN SERPL-MCNC: 20 MG/DL
CALCIUM SERPL-MCNC: 9 MG/DL
CHLORIDE SERPL-SCNC: 91 MMOL/L
CHOLEST SERPL-MCNC: 145 MG/DL
CHOLEST/HDLC SERPL: 1.5 RATIO
CO2 SERPL-SCNC: 26 MMOL/L
CREAT SERPL-MCNC: 0.31 MG/DL
CREAT SPEC-SCNC: 28 MG/DL
EOSINOPHIL # BLD AUTO: 0.06 K/UL
EOSINOPHIL NFR BLD AUTO: 1.2 %
GLUCOSE SERPL-MCNC: 91 MG/DL
HBA1C MFR BLD HPLC: 5.3 %
HCT VFR BLD CALC: 41.8 %
HDLC SERPL-MCNC: 95 MG/DL
HGB BLD-MCNC: 14.3 G/DL
IMM GRANULOCYTES NFR BLD AUTO: 0.2 %
LDLC SERPL CALC-MCNC: 38 MG/DL
LYMPHOCYTES # BLD AUTO: 0.97 K/UL
LYMPHOCYTES NFR BLD AUTO: 19.4 %
MAN DIFF?: NORMAL
MCHC RBC-ENTMCNC: 31.4 PG
MCHC RBC-ENTMCNC: 34.2 GM/DL
MCV RBC AUTO: 91.7 FL
MICROALBUMIN 24H UR DL<=1MG/L-MCNC: 2.7 MG/DL
MICROALBUMIN/CREAT 24H UR-RTO: 97 MG/G
MONOCYTES # BLD AUTO: 0.4 K/UL
MONOCYTES NFR BLD AUTO: 8 %
NEUTROPHILS # BLD AUTO: 3.54 K/UL
NEUTROPHILS NFR BLD AUTO: 70.8 %
PLATELET # BLD AUTO: 153 K/UL
POTASSIUM SERPL-SCNC: 4.4 MMOL/L
PROT SERPL-MCNC: 6.8 G/DL
RBC # BLD: 4.56 M/UL
RBC # FLD: 15.3 %
SODIUM SERPL-SCNC: 128 MMOL/L
TRIGL SERPL-MCNC: 58 MG/DL
TSH SERPL-ACNC: 1.82 UIU/ML
WBC # FLD AUTO: 5 K/UL

## 2018-11-01 NOTE — ED PROVIDER NOTE - CARE PLAN
[facility]  [address] [facility] [address] [address]  Appointment Phone [phone] Nurse Advisors [phone]    Priyanka Martinez      1972    Wound Dressing Change:left IT wound  Cleanse wound and surrounding skin with: soap and water  Cover wound with Mesalt, cover with gauze  Change dressing daily  Call your DME company to see if they are able to pressure map your chair.  If they cannot, please call us and we will place an order through [facility]  Please call [facility] [phone] ([address] [place] [place]) to schedule your MRI appointment if you have not heard from them in two business days.    Arrive 15 minutes early.  If you need to cancel or change the appointment please call [facility] [phone] ([address] [place])      Repositioning:    Bed:  Reposition pt MINIMALLY every 1-2 hours in bed to relieve pressure and promote perfusion to tissue.     Chair:  When up to chair pt should not sit for longer than one hour total before either standing or returning to bed for at least 10 minutes, again to relieve pressure and promote perfusion to tissue.     o Pt should also sit on a chair cushion when up to the chair.   A diet high in protein is important for wound healing, we recommend getting 60-90 grams of protein per day. Taking protein shakes or bars are a good way to get extra protein in your diet.        aZ Carter PA-C. November 1, 2018    Call us at [phone] if you have any questions about your wounds, have redness or swelling around your wound, have a fever of 101 or greater or if you have any other problems or concerns. We answer the phone Monday through Friday 8 am to 4 pm, please leave a message as we check the voicemail frequently throughout the day.     Follow up with Provider - 2 weeks           
Principal Discharge DX:	Jaw pain  Assessment and plan of treatment:	You may use Tylenol 650mg every 8 hours as needed for pain. You should apply a warm towel to the area 4 times per day for 20 minutes to help with the swelling.     Please call  to schedule a follow up appointment with Dr. Hopper our specialist in 1-2 weeks.    Return immediately to the ED for worsening pain, fevers (temperature over 101F) or for any other concerns.

## 2019-04-25 ENCOUNTER — APPOINTMENT (OUTPATIENT)
Dept: INTERNAL MEDICINE | Facility: CLINIC | Age: 84
End: 2019-04-25

## 2019-05-09 ENCOUNTER — APPOINTMENT (OUTPATIENT)
Dept: INTERNAL MEDICINE | Facility: CLINIC | Age: 84
End: 2019-05-09
Payer: MEDICARE

## 2019-05-09 VITALS
DIASTOLIC BLOOD PRESSURE: 58 MMHG | SYSTOLIC BLOOD PRESSURE: 148 MMHG | OXYGEN SATURATION: 97 % | WEIGHT: 94 LBS | HEART RATE: 71 BPM | BODY MASS INDEX: 18.46 KG/M2 | TEMPERATURE: 97.9 F | HEIGHT: 60 IN

## 2019-05-09 DIAGNOSIS — R63.6 UNDERWEIGHT: ICD-10-CM

## 2019-05-09 DIAGNOSIS — Z00.00 ENCOUNTER FOR GENERAL ADULT MEDICAL EXAMINATION W/OUT ABNORMAL FINDINGS: ICD-10-CM

## 2019-05-09 DIAGNOSIS — Z92.29 PERSONAL HISTORY OF OTHER DRUG THERAPY: ICD-10-CM

## 2019-05-09 DIAGNOSIS — E78.5 HYPERLIPIDEMIA, UNSPECIFIED: ICD-10-CM

## 2019-05-09 DIAGNOSIS — E55.9 VITAMIN D DEFICIENCY, UNSPECIFIED: ICD-10-CM

## 2019-05-09 DIAGNOSIS — Z13.31 ENCOUNTER FOR SCREENING FOR DEPRESSION: ICD-10-CM

## 2019-05-09 DIAGNOSIS — Z13.39 ENCOUNTER FOR SCREENING EXAM FOR OTHER MENTAL HEALTH AND BEHAVIORAL DISORDERS: ICD-10-CM

## 2019-05-09 PROCEDURE — G0442 ANNUAL ALCOHOL SCREEN 15 MIN: CPT | Mod: 59

## 2019-05-09 PROCEDURE — 99214 OFFICE O/P EST MOD 30 MIN: CPT | Mod: 25

## 2019-05-09 PROCEDURE — G0439: CPT

## 2019-05-09 PROCEDURE — G0444 DEPRESSION SCREEN ANNUAL: CPT | Mod: 59

## 2019-05-09 NOTE — HISTORY OF PRESENT ILLNESS
[FreeTextEntry1] : Presents for preventive visit as well as concerns regarding hyperlipidemia, underweight and vitamin D insufficiency. [de-identified] : \par Preventive visit: pt is up to date with pneumonia vaccines; she is passed the age of screening mammogram, colonoscopy and PAP tests; she feels safe at home and has a home health aid to assist with her ADLs; she does not smoke cigarettes and does not misuse alcohol; she wears seatbelts when in vehicles; she does not drive\par \par Medical Issue 1: Hyperlipidemia: unstable with fluctuations in LDL and latest LDL level was actually too low; she is taking statin medication every night; she denies myalgias and other side effects from the medication; she says she watches her diet and is asking if she could stop the medication now\par \par Medical Issue 2: Underweight: unstable and poorly controlled; she has been unable to increase her weight; she has tried several supplements including Allen and Ensure but she has been unable to be compliant with them because of the taste\par \par Medical Issue 3: Vitamin D insufficiency: unstable and variably controlled; she is compliant with 2000iu D3 daily but does not eat dairy on a regular basis; she is underweight as well which complicates her risk of bone fracture

## 2019-05-09 NOTE — PHYSICAL EXAM
[No Acute Distress] : no acute distress [Well Nourished] : well nourished [Well Developed] : well developed [Well-Appearing] : well-appearing [Normal Sclera/Conjunctiva] : normal sclera/conjunctiva [Normal Outer Ear/Nose] : the outer ears and nose were normal in appearance [PERRL] : pupils equal round and reactive to light [EOMI] : extraocular movements intact [No JVD] : no jugular venous distention [Normal Oropharynx] : the oropharynx was normal [Thyroid Normal, No Nodules] : the thyroid was normal and there were no nodules present [Supple] : supple [No Lymphadenopathy] : no lymphadenopathy [No Respiratory Distress] : no respiratory distress  [Clear to Auscultation] : lungs were clear to auscultation bilaterally [No Accessory Muscle Use] : no accessory muscle use [Normal Rate] : normal rate  [Regular Rhythm] : with a regular rhythm [No Murmur] : no murmur heard [Normal S1, S2] : normal S1 and S2 [No Carotid Bruits] : no carotid bruits [No Abdominal Bruit] : a ~M bruit was not heard ~T in the abdomen [No Edema] : there was no peripheral edema [No Varicosities] : no varicosities [Pedal Pulses Present] : the pedal pulses are present [No Palpable Aorta] : no palpable aorta [No Extremity Clubbing/Cyanosis] : no extremity clubbing/cyanosis [Non Tender] : non-tender [Soft] : abdomen soft [No Masses] : no abdominal mass palpated [No HSM] : no HSM [Non-distended] : non-distended [Normal Posterior Cervical Nodes] : no posterior cervical lymphadenopathy [Normal Bowel Sounds] : normal bowel sounds [Normal Anterior Cervical Nodes] : no anterior cervical lymphadenopathy [No CVA Tenderness] : no CVA  tenderness [No Spinal Tenderness] : no spinal tenderness [No Joint Swelling] : no joint swelling [Grossly Normal Strength/Tone] : grossly normal strength/tone [No Rash] : no rash [Coordination Grossly Intact] : coordination grossly intact [Normal Gait] : normal gait [Deep Tendon Reflexes (DTR)] : deep tendon reflexes were 2+ and symmetric [No Focal Deficits] : no focal deficits [Normal Insight/Judgement] : insight and judgment were intact [Normal Affect] : the affect was normal

## 2019-05-09 NOTE — ASSESSMENT
[FreeTextEntry1] : \par Preventive visit: pt is up to date with pneumonia vaccines; she is passed the age of screening mammogram, colonoscopy and PAP tests; advised home health aid to assist with her ADLs; praised pt's tobacco-free lifestyle; encouraged use of smoke/CO detectors in the house and use of seatbelts when in vehicles\par \par Medical Issue 1: Hyperlipidemia: unstable with fluctuations in LDL and latest LDL level was actually too low; check level today via blood draw along with LFTs; may stop statin depending on LDL level\par \par Medical Issue 2: Underweight: unstable and poorly controlled; she has been unable to increase her weight; she has tried several supplements including Allen and Ensure but she has been unable to be compliant with them because of the taste\par \par Medical Issue 3: Vitamin D insufficiency: unstable and variably controlled; continue with 2000iu D3 daily; check Vit D, 25 hydroxy via blood draw today\par \par Depression screen performed today, PHQ2=0\par \par Annual alcohol misuse screen, 15 mins, done; negative

## 2019-05-09 NOTE — HEALTH RISK ASSESSMENT
[Good] : ~his/her~  mood as  good [No falls in past year] : Patient reported no falls in the past year [] : No [Language] : denies difficulty with language [Change in mental status noted] : No change in mental status noted [Behavior] : denies difficulty with behavior [Learning/Retaining New Information] : denies difficulty learning/retaining new information [Handling Complex Tasks] : denies difficulty handling complex tasks [Reasoning] : denies difficulty with reasoning [Spatial Ability and Orientation] : denies difficulty with spatial ability and orientation [None] : None [Retired] : retired [] :  [High Risk Behavior] : no high risk behavior [Sexually Active] : not sexually active [Feels Safe at Home] : Feels safe at home [Independent] : using telephone [Some assistance needed] : managing finances [Reports changes in hearing] : Reports no changes in hearing [Full assistance needed] : using transportation [Reports changes in vision] : Reports no changes in vision [Reports normal functional visual acuity (ie: able to read med bottle)] : Reports normal functional visual acuity [Reports changes in dental health] : Reports no changes in dental health [Smoke Detector] : smoke detector [Carbon Monoxide Detector] : carbon monoxide detector [Guns at Home] : no guns at home [Seat Belt] :  uses seat belt [Sunscreen] : uses sunscreen [Safety elements used in home] : safety elements used in home [TB Exposure] : is not being exposed to tuberculosis [Travel to Developing Areas] : does not  travel to developing areas [de-identified] : with home health aid [Caregiver Concerns] : does not have caregiver concerns [Reviewed no changes] : Reviewed no changes [AdvancecareDate] : 05/19

## 2019-05-20 LAB
25(OH)D3 SERPL-MCNC: 10.9 NG/ML
ALBUMIN SERPL ELPH-MCNC: 4 G/DL
ALP BLD-CCNC: 143 U/L
ALT SERPL-CCNC: 11 U/L
ANION GAP SERPL CALC-SCNC: 10 MMOL/L
AST SERPL-CCNC: 12 U/L
BILIRUB SERPL-MCNC: 0.4 MG/DL
BUN SERPL-MCNC: 21 MG/DL
CALCIUM SERPL-MCNC: 8.8 MG/DL
CHLORIDE SERPL-SCNC: 98 MMOL/L
CHOLEST SERPL-MCNC: 134 MG/DL
CHOLEST/HDLC SERPL: 1.5 RATIO
CO2 SERPL-SCNC: 27 MMOL/L
CREAT SERPL-MCNC: 0.51 MG/DL
GLUCOSE SERPL-MCNC: 91 MG/DL
HDLC SERPL-MCNC: 89 MG/DL
LDLC SERPL CALC-MCNC: 35 MG/DL
POTASSIUM SERPL-SCNC: 4.3 MMOL/L
PROT SERPL-MCNC: 6.8 G/DL
SODIUM SERPL-SCNC: 135 MMOL/L
TRIGL SERPL-MCNC: 51 MG/DL

## 2019-06-20 ENCOUNTER — INPATIENT (INPATIENT)
Facility: HOSPITAL | Age: 84
LOS: 7 days | Discharge: NURSING, REHAB OR LONG-TERM CARE | DRG: 549 | End: 2019-06-28
Attending: HOSPITALIST | Admitting: HOSPITALIST
Payer: MEDICARE

## 2019-06-20 VITALS
DIASTOLIC BLOOD PRESSURE: 71 MMHG | HEART RATE: 65 BPM | RESPIRATION RATE: 19 BRPM | TEMPERATURE: 99 F | HEIGHT: 61 IN | WEIGHT: 100.09 LBS | SYSTOLIC BLOOD PRESSURE: 137 MMHG | OXYGEN SATURATION: 93 %

## 2019-06-20 DIAGNOSIS — E78.5 HYPERLIPIDEMIA, UNSPECIFIED: ICD-10-CM

## 2019-06-20 DIAGNOSIS — M25.531 PAIN IN RIGHT WRIST: ICD-10-CM

## 2019-06-20 DIAGNOSIS — H26.9 UNSPECIFIED CATARACT: ICD-10-CM

## 2019-06-20 DIAGNOSIS — Z29.9 ENCOUNTER FOR PROPHYLACTIC MEASURES, UNSPECIFIED: ICD-10-CM

## 2019-06-20 DIAGNOSIS — M25.431 EFFUSION, RIGHT WRIST: ICD-10-CM

## 2019-06-20 LAB
ALBUMIN SERPL ELPH-MCNC: 4 G/DL — SIGNIFICANT CHANGE UP (ref 3.3–5)
ALP SERPL-CCNC: 124 U/L — HIGH (ref 40–120)
ALT FLD-CCNC: 9 U/L — LOW (ref 10–45)
ANION GAP SERPL CALC-SCNC: 10 MMOL/L — SIGNIFICANT CHANGE UP (ref 5–17)
APPEARANCE UR: CLEAR — SIGNIFICANT CHANGE UP
APTT BLD: 26.9 SEC — LOW (ref 27.5–36.3)
AST SERPL-CCNC: 11 U/L — SIGNIFICANT CHANGE UP (ref 10–40)
BACTERIA # UR AUTO: NEGATIVE — SIGNIFICANT CHANGE UP
BASOPHILS # BLD AUTO: 0 K/UL — SIGNIFICANT CHANGE UP (ref 0–0.2)
BASOPHILS NFR BLD AUTO: 0.2 % — SIGNIFICANT CHANGE UP (ref 0–2)
BILIRUB SERPL-MCNC: 0.7 MG/DL — SIGNIFICANT CHANGE UP (ref 0.2–1.2)
BILIRUB UR-MCNC: NEGATIVE — SIGNIFICANT CHANGE UP
BUN SERPL-MCNC: 19 MG/DL — SIGNIFICANT CHANGE UP (ref 7–23)
CALCIUM SERPL-MCNC: 9.1 MG/DL — SIGNIFICANT CHANGE UP (ref 8.4–10.5)
CHLORIDE SERPL-SCNC: 94 MMOL/L — LOW (ref 96–108)
CO2 SERPL-SCNC: 27 MMOL/L — SIGNIFICANT CHANGE UP (ref 22–31)
COLOR SPEC: SIGNIFICANT CHANGE UP
CREAT SERPL-MCNC: 0.48 MG/DL — LOW (ref 0.5–1.3)
CRP SERPL-MCNC: 5.84 MG/DL — HIGH (ref 0–0.4)
DIFF PNL FLD: ABNORMAL
EOSINOPHIL # BLD AUTO: 0 K/UL — SIGNIFICANT CHANGE UP (ref 0–0.5)
EOSINOPHIL NFR BLD AUTO: 0.3 % — SIGNIFICANT CHANGE UP (ref 0–6)
EPI CELLS # UR: 0 /HPF — SIGNIFICANT CHANGE UP
ERYTHROCYTE [SEDIMENTATION RATE] IN BLOOD: 28 MM/HR — HIGH (ref 0–20)
GAS PNL BLDV: SIGNIFICANT CHANGE UP
GLUCOSE SERPL-MCNC: 105 MG/DL — HIGH (ref 70–99)
GLUCOSE UR QL: NEGATIVE — SIGNIFICANT CHANGE UP
HCT VFR BLD CALC: 42 % — SIGNIFICANT CHANGE UP (ref 34.5–45)
HGB BLD-MCNC: 14.5 G/DL — SIGNIFICANT CHANGE UP (ref 11.5–15.5)
HYALINE CASTS # UR AUTO: 0 /LPF — SIGNIFICANT CHANGE UP (ref 0–2)
INR BLD: 1.28 RATIO — HIGH (ref 0.88–1.16)
KETONES UR-MCNC: NEGATIVE — SIGNIFICANT CHANGE UP
LEUKOCYTE ESTERASE UR-ACNC: NEGATIVE — SIGNIFICANT CHANGE UP
LYMPHOCYTES # BLD AUTO: 1.2 K/UL — SIGNIFICANT CHANGE UP (ref 1–3.3)
LYMPHOCYTES # BLD AUTO: 10.8 % — LOW (ref 13–44)
MCHC RBC-ENTMCNC: 32.5 PG — SIGNIFICANT CHANGE UP (ref 27–34)
MCHC RBC-ENTMCNC: 34.4 GM/DL — SIGNIFICANT CHANGE UP (ref 32–36)
MCV RBC AUTO: 94.2 FL — SIGNIFICANT CHANGE UP (ref 80–100)
MONOCYTES # BLD AUTO: 1.5 K/UL — HIGH (ref 0–0.9)
MONOCYTES NFR BLD AUTO: 14.1 % — HIGH (ref 2–14)
NEUTROPHILS # BLD AUTO: 8 K/UL — HIGH (ref 1.8–7.4)
NEUTROPHILS NFR BLD AUTO: 74.6 % — SIGNIFICANT CHANGE UP (ref 43–77)
NITRITE UR-MCNC: NEGATIVE — SIGNIFICANT CHANGE UP
PH UR: 6.5 — SIGNIFICANT CHANGE UP (ref 5–8)
PLATELET # BLD AUTO: 166 K/UL — SIGNIFICANT CHANGE UP (ref 150–400)
POTASSIUM SERPL-MCNC: 4 MMOL/L — SIGNIFICANT CHANGE UP (ref 3.5–5.3)
POTASSIUM SERPL-SCNC: 4 MMOL/L — SIGNIFICANT CHANGE UP (ref 3.5–5.3)
PROT SERPL-MCNC: 7.6 G/DL — SIGNIFICANT CHANGE UP (ref 6–8.3)
PROT UR-MCNC: NEGATIVE — SIGNIFICANT CHANGE UP
PROTHROM AB SERPL-ACNC: 14.8 SEC — HIGH (ref 10–12.9)
RBC # BLD: 4.46 M/UL — SIGNIFICANT CHANGE UP (ref 3.8–5.2)
RBC # FLD: 12.8 % — SIGNIFICANT CHANGE UP (ref 10.3–14.5)
RBC CASTS # UR COMP ASSIST: 9 /HPF — HIGH (ref 0–4)
SODIUM SERPL-SCNC: 131 MMOL/L — LOW (ref 135–145)
SP GR SPEC: 1.01 — SIGNIFICANT CHANGE UP (ref 1.01–1.02)
UROBILINOGEN FLD QL: NEGATIVE — SIGNIFICANT CHANGE UP
WBC # BLD: 10.8 K/UL — HIGH (ref 3.8–10.5)
WBC # FLD AUTO: 10.8 K/UL — HIGH (ref 3.8–10.5)
WBC UR QL: 0 /HPF — SIGNIFICANT CHANGE UP (ref 0–5)

## 2019-06-20 PROCEDURE — 99223 1ST HOSP IP/OBS HIGH 75: CPT

## 2019-06-20 PROCEDURE — 73110 X-RAY EXAM OF WRIST: CPT | Mod: 26,RT

## 2019-06-20 PROCEDURE — 70450 CT HEAD/BRAIN W/O DYE: CPT | Mod: 26

## 2019-06-20 PROCEDURE — 72170 X-RAY EXAM OF PELVIS: CPT | Mod: 26

## 2019-06-20 PROCEDURE — 99285 EMERGENCY DEPT VISIT HI MDM: CPT | Mod: GC

## 2019-06-20 RX ORDER — ACETAMINOPHEN 500 MG
975 TABLET ORAL ONCE
Refills: 0 | Status: COMPLETED | OUTPATIENT
Start: 2019-06-20 | End: 2019-06-20

## 2019-06-20 RX ORDER — CEFAZOLIN SODIUM 1 G
1000 VIAL (EA) INJECTION EVERY 12 HOURS
Refills: 0 | Status: DISCONTINUED | OUTPATIENT
Start: 2019-06-20 | End: 2019-06-21

## 2019-06-20 RX ORDER — SIMVASTATIN 20 MG/1
10 TABLET, FILM COATED ORAL AT BEDTIME
Refills: 0 | Status: DISCONTINUED | OUTPATIENT
Start: 2019-06-20 | End: 2019-06-28

## 2019-06-20 RX ORDER — PREDNISOLONE SODIUM PHOSPHATE 1 %
1 DROPS OPHTHALMIC (EYE) EVERY 12 HOURS
Refills: 0 | Status: DISCONTINUED | OUTPATIENT
Start: 2019-06-20 | End: 2019-06-22

## 2019-06-20 RX ORDER — CEFAZOLIN SODIUM 1 G
1000 VIAL (EA) INJECTION ONCE
Refills: 0 | Status: COMPLETED | OUTPATIENT
Start: 2019-06-20 | End: 2019-06-20

## 2019-06-20 RX ORDER — HEPARIN SODIUM 5000 [USP'U]/ML
5000 INJECTION INTRAVENOUS; SUBCUTANEOUS EVERY 12 HOURS
Refills: 0 | Status: DISCONTINUED | OUTPATIENT
Start: 2019-06-20 | End: 2019-06-28

## 2019-06-20 RX ORDER — SODIUM CHLORIDE 9 MG/ML
1000 INJECTION INTRAMUSCULAR; INTRAVENOUS; SUBCUTANEOUS ONCE
Refills: 0 | Status: COMPLETED | OUTPATIENT
Start: 2019-06-20 | End: 2019-06-20

## 2019-06-20 RX ADMIN — Medication 975 MILLIGRAM(S): at 13:50

## 2019-06-20 RX ADMIN — Medication 1000 MILLIGRAM(S): at 14:58

## 2019-06-20 RX ADMIN — HEPARIN SODIUM 5000 UNIT(S): 5000 INJECTION INTRAVENOUS; SUBCUTANEOUS at 18:24

## 2019-06-20 RX ADMIN — Medication 1 DROP(S): at 21:00

## 2019-06-20 RX ADMIN — SODIUM CHLORIDE 1000 MILLILITER(S): 9 INJECTION INTRAMUSCULAR; INTRAVENOUS; SUBCUTANEOUS at 13:30

## 2019-06-20 RX ADMIN — Medication 975 MILLIGRAM(S): at 12:46

## 2019-06-20 RX ADMIN — SIMVASTATIN 10 MILLIGRAM(S): 20 TABLET, FILM COATED ORAL at 21:00

## 2019-06-20 RX ADMIN — Medication 100 MILLIGRAM(S): at 13:30

## 2019-06-20 NOTE — CONSULT NOTE ADULT - SUBJECTIVE AND OBJECTIVE BOX
Plastic Surgery Consult Note  ([phone])    HPI:  89 y/o woman w/ PMH HLD, mild dementia, R cataract c/b corneal injury, ?prior subclinical L basal ganglia CVA p/w R wrist pain and swelling x 2 days. Patient reports she began feeling a subtle  dull pain in her R wrist while at home trying to use her walker. She was unable to put weight on her R hand. She noticed "redness" on tracking from just distal to her elbow to her R hand, symptoms that progressed over the past 24 hours. Pain is rated 6/10 at its worst. It is exacerbated with any movement of the R wrist (flexion/extension/abduction/adduction) and has some relief with tylenol. She has had some difficulty w/ finger extension, as well. Reports some mild chills, but no fevers. Denies any preceding skin tear/breakdown in the R wrist area. No drainage or pus. Denies recent falls, no wrist trauma. She has no h/o gout or pseudogout. ROS is otherwise unremarkable. Patient has adequate functional status at baseline, ambulates w/ walker and lives with her daughter in an apartment. No dysphagia. Daughter reports mental status is at her baseline, w/ some very mild confusion at times but generally independent in her ADLs and many IADLs. (2019 17:18)    Asked to evaluate due to concern for septic arthritis     PAST MEDICAL & SURGICAL HISTORY:  Wrist fracture  Hip fracture  HTN (Hypertension)  Cerebrovascular Accident (Stroke)  Cataract  Status Post Small Bowel Resection  C Section  S/P Exploratory Laparotomy      Allergies    No Known Allergies    Intolerances    penicillin (Stomach Upset)      Home Medications:  prednisoLONE acetate 1% ophthalmic suspension: 1 drop(s) to right eye every 12 hours    NOTE: pharmacy dispensed 1 drop to right eye once daily (2019 17:12)  simvastatin 10 mg oral tablet: 1 tab(s) orally once a day (at bedtime) (2019 17:12)      MEDICATIONS  (STANDING):  ceFAZolin   IVPB 1000 milliGRAM(s) IV Intermittent every 12 hours  heparin  Injectable 5000 Unit(s) SubCutaneous every 12 hours  prednisoLONE acetate 1% Suspension 1 Drop(s) Both EYES every 12 hours  simvastatin 10 milliGRAM(s) Oral at bedtime      SOCIAL HISTORY:    FAMILY HISTORY:  Paternal family history of hypertension      ___________________________________________  REVIEW OF SYSTEMS:    Constitutional: reported fevers    ___________________________________________  OBJECTIVE:  Vital Signs Last 24 Hrs  T(C): 37.2 (2019 16:49), Max: 37.9 (2019 13:43)  T(F): 99 (2019 16:49), Max: 100.2 (2019 13:43)  HR: 87 (2019 16:49) (65 - 87)  BP: 152/77 (2019 16:49) (137/71 - 161/75)  BP(mean): --  RR: 18 (2019 12:35) (18 - 19)  SpO2: 96% (2019 16:49) (93% - 96%)CAPILLARY BLOOD GLUCOSE        I&O's Detail      PHYSICAL EXAM:    General: Alert, NAD Confused    Respiratory: Breathing non-labored, airway patent  Extremities:  MSK: Right wrist erythema and dorsal swelling  mild tenderness  ____________________________________________  LABS:  CBC Full  -  ( 2019 13:02 )  WBC Count : 10.8 K/uL  RBC Count : 4.46 M/uL  Hemoglobin : 14.5 g/dL  Hematocrit : 42.0 %  Platelet Count - Automated : 166 K/uL  Mean Cell Volume : 94.2 fl  Mean Cell Hemoglobin : 32.5 pg  Mean Cell Hemoglobin Concentration : 34.4 gm/dL  Auto Neutrophil # : 8.0 K/uL  Auto Lymphocyte # : 1.2 K/uL  Auto Monocyte # : 1.5 K/uL  Auto Eosinophil # : 0.0 K/uL  Auto Basophil # : 0.0 K/uL  Auto Neutrophil % : 74.6 %  Auto Lymphocyte % : 10.8 %  Auto Monocyte % : 14.1 %  Auto Eosinophil % : 0.3 %  Auto Basophil % : 0.2 %    06-20    131<L>  |  94<L>  |  19  ----------------------------<  105<H>  4.0   |  27  |  0.48<L>    Ca    9.1      2019 13:02    TPro  7.6  /  Alb  4.0  /  TBili  0.7  /  DBili  x   /  AST  11  /  ALT  9<L>  /  AlkPhos  124<H>  06-20    LIVER FUNCTIONS - ( 2019 13:02 )  Alb: 4.0 g/dL / Pro: 7.6 g/dL / ALK PHOS: 124 U/L / ALT: 9 U/L / AST: 11 U/L / GGT: x           PT/INR - ( 2019 13:02 )   PT: 14.8 sec;   INR: 1.28 ratio         PTT - ( 2019 13:02 )  PTT:26.9 sec  Urinalysis Basic - ( 2019 15:44 )    Color: Light Yellow / Appearance: Clear / S.013 / pH: x  Gluc: x / Ketone: Negative  / Bili: Negative / Urobili: Negative   Blood: x / Protein: Negative / Nitrite: Negative   Leuk Esterase: Negative / RBC: 9 /hpf / WBC 0 /HPF   Sq Epi: x / Non Sq Epi: 0 /hpf / Bacteria: Negative      < from: Xray Wrist 3 Views, Right (19 @ 13:37) >      < end of copied text >  < from: Xray Wrist 3 Views, Right (19 @ 13:37) >    EXAM:  WRIST COMPLETE RIGHT-MIN 3 VIEWS                            PROCEDURE DATE:  2019            INTERPRETATION:  CLINICAL INDICATION: Erythema and swelling along the   dorsal wrist. Status post fall.    EXAM: PA, lateral and oblique viewsof the right wrist    COMPARISON: None      IMPRESSION:   The bones are diffusely demineralized. No discrete fracture is   visualized. There is widening of the scapholunate ligament, consistent   with ligamentous insufficiency-prior injury. There is chondrocalcinosis.   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 There is soft tissue   swelling about the carpus.        IMP Right wrist swelling and pain  Doubt septic arthritis has significant arthritic changes in carpus which may be source of presentation   Rec Trial of NSAID vs steroids  Will follow

## 2019-06-20 NOTE — ED ADULT NURSE NOTE - CHPI ED NUR SYMPTOMS NEG
no numbness/no fever/no bruising/no deformity/no stiffness/no tingling/no difficulty bearing weight/no abrasion/no back pain

## 2019-06-20 NOTE — ED PROVIDER NOTE - PHYSICAL EXAMINATION
PHYSICAL EXAM:  GENERAL: NAD, well-groomed, well-developed, rectally febrile   HEAD:  Atraumatic, Normocephalic  EYES: EOMI, PERRLA, conjunctiva and sclera clear, changes to R cornea   ENMT: No tonsillar erythema, exudates, or enlargement; Moist mucous membranes  NECK: Supple, No JVD, no midline cspine tenderness   HEART: Regular rate and rhythm; No murmurs, rubs, or gallops  RESPIRATORY: CTA B/L, No W/R/R  ABDOMEN: Soft, Nontender, Nondistended  NEUROLOGY: A&Ox3, hard of hearing, oriented but inconsistent historian, normal speech, no facial droop, +memory changes, normal strength in extremities   EXTREMITIES:  2+ Peripheral Pulses,, erythema, swelling, R dorsal wrist, pain with ROM, most tender dorslaly  SKIN: warm, dry, normal color PHYSICAL EXAM:  GENERAL: NAD, well-groomed, well-developed, rectally febrile   HEAD:  Atraumatic, Normocephalic  EYES: EOMI, PERRLA, conjunctiva and sclera clear, changes to R cornea   ENMT: No tonsillar erythema, exudates, or enlargement; Moist mucous membranes  NECK: Supple, No JVD, no midline cspine tenderness   HEART: Regular rate and rhythm; No murmurs, rubs, or gallops  RESPIRATORY: CTA B/L, No W/R/R  ABDOMEN: Soft, Nontender, Nondistended  NEUROLOGY: A&Ox3, hard of hearing, oriented but inconsistent historian, normal speech, no facial droop, +memory changes, normal strength in extremities   EXTREMITIES:  2+ Peripheral Pulses,, erythema, swelling, R dorsal wrist, pain with ROM, most tender dorsally, very limited active ROM, 2+ radial pulse, fingers appear normally distally, motor/sensory intact distally   SKIN: warm, dry, normal color

## 2019-06-20 NOTE — H&P ADULT - HISTORY OF PRESENT ILLNESS
89 y/o woman w/ PMH HLD, mild dementia, R cataract c/b corneal injury p/w R wrist pain and swelling x 2 days. Patient reports she began feeling a subtle  dull pain in her R wrist while at home trying to use her walker. She was unable to put weight on her R hand. She noticed "redness" on tracking from just distal to her elbow to her R hand, symptoms that progressed over the past 24 hours. Pain is rated 6/10 at its worst. It is exacerbated with any movement of the R wrist (flexion/extension/abduction/adduction) and has some relief with 89 y/o woman w/ PMH HLD, mild dementia, R cataract c/b corneal injury, ?prior subclinical L basal ganglia CVA p/w R wrist pain and swelling x 2 days. Patient reports she began feeling a subtle  dull pain in her R wrist while at home trying to use her walker. She was unable to put weight on her R hand. She noticed "redness" on tracking from just distal to her elbow to her R hand, symptoms that progressed over the past 24 hours. Pain is rated 6/10 at its worst. It is exacerbated with any movement of the R wrist (flexion/extension/abduction/adduction) and has some relief with tylenol. She has had some difficulty w/ finger extension, as well. Reports some mild chills, but no fevers. Denies any preceding skin tear/breakdown in the R wrist area. No drainage or pus. Denies recent falls, no wrist trauma. She has no h/o gout or pseudogout. ROS is otherwise unremarkable. Patient has adequate functional status at baseline, ambulates w/ walker and lives with her daughter in an apartment. No dysphagia. Daughter reports mental status is at her baseline, w/ some very mild confusion at times but generally independent in her ADLs and many IADLs.

## 2019-06-20 NOTE — H&P ADULT - NSHPPHYSICALEXAM_GEN_ALL_CORE
Vital Signs Last 24 Hrs  T(C): 37.2 (20 Jun 2019 16:49), Max: 37.9 (20 Jun 2019 13:43)  T(F): 99 (20 Jun 2019 16:49), Max: 100.2 (20 Jun 2019 13:43)  HR: 87 (20 Jun 2019 16:49) (65 - 87)  BP: 152/77 (20 Jun 2019 16:49) (137/71 - 161/75)  BP(mean): --  RR: 18 (20 Jun 2019 12:35) (18 - 19)  SpO2: 96% (20 Jun 2019 16:49) (93% - 96%)  PHYSICAL EXAM:  Constitutional: Well-appearing, NAD, lying in bed, appears stated age    Eyes: Prior cataract in R eye now, EOMI    ENMT: No pharyngeal erythema, mucus membranes moist    Neck: No JVD    Back: +kyphosis    Respiratory: Lungs clear to auscultation     Cardiovascular: Regular rate and rhythm, S1S2+, no murmurs appreciated. No LE edema    Gastrointestinal: Soft, non-tender, non-distended, bowel sounds positive all four quadrants    Extremities: no clubbing or cyanosis    Vascular: 2+ pulses radially and dorsalis pedis    Neurological: Alert and oriented to name, place, and date.  Cranial nerves II-XII intact.  No focal neurological deficits.  5/5 motor strength all four extremities    Skin: Erythema circumferentially around right wrist from proximal hand ~5 cm     Lymph Nodes: No cervical lymphadenopathy    Musculoskeletal: R wrist visibly swollen, tender to palpation, warm to touch, limited ROM in all directions     Psychiatric: Pleasant affect

## 2019-06-20 NOTE — ED PROVIDER NOTE - CLINICAL SUMMARY MEDICAL DECISION MAKING FREE TEXT BOX
Haverty PGY1- fever, R wrist pain, ?fall/injury 2 days ago, poor historian, no head trauma, unable to walk with walker 2/2 to pain   likely admit for septic joint evaluation, CTH to eval for memory changes, urine, blood, cxs, abx

## 2019-06-20 NOTE — ED ADULT NURSE NOTE - OBJECTIVE STATEMENT
Female 89 years old brought in by EMS from home for right wrist pain. Right wrist red, swollen and tender on palpation and warm to touch. With limited range of motion of right wrist. Right hand fingers mobile. Female 89 years old brought in by EMS from home for right wrist pain. Right wrist red, swollen and tender on palpation and warm to touch. With limited range of motion of right wrist. Right hand fingers mobile. Unclear if there's injury. As per daughter she came home last night from work and pt complained to her of pain and swelling of her right wrist. Radial pulse positive. Pt uses walker in ambulating. Denies fever, chills, nausea and vomiting. Safety maintained. Will continue to reassess.

## 2019-06-20 NOTE — ED PROVIDER NOTE - NS ED MD EM SELECTION
Bilobed Transposition Flap Text: The defect edges were debeveled with a #15 scalpel blade.  Given the location of the defect and the proximity to free margins a bilobed transposition flap was deemed most appropriate.  Using a sterile surgical marker, an appropriate bilobe flap drawn around the defect.    The area thus outlined was incised deep to adipose tissue with a #15 scalpel blade.  The skin margins were undermined to an appropriate distance in all directions utilizing iris scissors. [id number] Comprehensive

## 2019-06-20 NOTE — ED PROVIDER NOTE - PROGRESS NOTE DETAILS
Sanchez PGY1- d/w with Dr. Gonzalez (hand) he will see pt around 17:00 for evaluation, possible tap of R wrist, admitted to hospitalist service at requests of PCP office, Dr. Gonzales.

## 2019-06-20 NOTE — H&P ADULT - PROBLEM SELECTOR PLAN 1
Some concern for possible wrist infection, non-infectious causes are less suspicious given clinical presentation. Case was d/w hand surgery (Dr. Gonzalez).  - Hand surgery to evaluate this morning, recommended treating empirically with antibiotics and antiinflammatory medications for now and likely to defer arthrocentesis   - Check MRI R wrist w/ contrast (d/w radiology, d/w patient's daughter, Cathryn; pt w/o MRI contraindications)  - Start ancef 1 g IV q12h for now (renal dosing)  - ID consulted, to formally evaluate in AM (d/w fellow, Dr. Wilson)  - Hold on NSAIDs for tonight given borderline renal function, will trend BMP

## 2019-06-20 NOTE — H&P ADULT - ASSESSMENT
89 y/o woman w/ PMH HLD, mild dementia, R cataract c/b corneal injury, ?prior subclinical L basal ganglia CVA p/w R wrist pain and swelling x 2 days, concerning for possible wrist infection.

## 2019-06-20 NOTE — H&P ADULT - NSICDXPASTSURGICALHX_GEN_ALL_CORE_FT
PAST SURGICAL HISTORY:  C Section     S/P Exploratory Laparotomy     Status Post Small Bowel Resection

## 2019-06-20 NOTE — H&P ADULT - NSHPREVIEWOFSYSTEMS_GEN_ALL_CORE
General:	+mild chills, but no fevers, weight loss, or decreased appetite    Skin/Breast: See HPI  	  Ophthalmologic: No change in vision or eye pain  	  ENMT: No sore throat or dry mouth    Respiratory and Thorax: No SOB or cough  	  Cardiovascular: No chest pain, palpitations, or LE edema    Gastrointestinal: No abdominal pain, nausea, vomiting, or diarrhea    Genitourinary: No dysuria or polyuria    Musculoskeletal: see HPI    Neurological: No headaches or new neurological deficits    Psychiatric: No depression or anxiety    Hematology/Lymphatics: No bruising

## 2019-06-20 NOTE — H&P ADULT - NSICDXPASTMEDICALHX_GEN_ALL_CORE_FT
PAST MEDICAL HISTORY:  Cataract     Cerebrovascular Accident (Stroke)     Hip fracture     HTN (Hypertension)     Wrist fracture

## 2019-06-20 NOTE — ED ADULT NURSE NOTE - NSIMPLEMENTINTERV_GEN_ALL_ED
Implemented All Fall with Harm Risk Interventions:  [place] to call system. Call bell, personal items and telephone within reach. Instruct patient to call for assistance. Room bathroom lighting operational. Non-slip footwear when patient is off stretcher. Physically safe environment: no spills, clutter or unnecessary equipment. Stretcher in lowest position, wheels locked, appropriate side rails in place. Provide visual cue, wrist band, yellow gown, etc. Monitor gait and stability. Monitor for mental status changes and reorient to person, place, and time. Review medications for side effects contributing to fall risk. Reinforce activity limits and safety measures with patient and family. Provide visual clues: red socks.

## 2019-06-20 NOTE — ED PROVIDER NOTE - OBJECTIVE STATEMENT
89 yoF, PMHx of HLD, s/p R corneal tx on prednisolone drops, mild dementia BIB EMS with daughter for R wrist pain. Pt is poor historian and is intermittently saying she fell 2 days ago but unclear how when and if she fell. Denies hitting hear head. No urinary sx or recent illness. 89 yoF, PMHx of HLD, s/p R corneal tx on prednisolone drops, mild dementia BIB EMS with daughter for R wrist pain. Pt is poor historian and is intermittently saying she fell 2 days ago but unclear how when and if she fell. Denies hitting hear head. No urinary sx or recent illness. Normally walks with a walker but unable due 2/2 to wrist pain.

## 2019-06-20 NOTE — H&P ADULT - NSHPLABSRESULTS_GEN_ALL_CORE
( @ 13:02)                      14.5  10.8 )-----------( 166                 42.0    Neutrophils = 8.0 (74.6%)  Lymphocytes = 1.2 (10.8%)  Eosinophils = 0.0 (0.3%)  Basophils = 0.0 (0.2%)  Monocytes = 1.5 (14.1%)  Bands = --%        131<L>  |  94<L>  |  19  ----------------------------<  105<H>  4.0   |  27  |  0.48<L>    Ca    9.1      2019 13:02    TPro  7.6  /  Alb  4.0  /  TBili  0.7  /  DBili  x   /  AST  11  /  ALT  9<L>  /  AlkPhos  124<H>      ( 2019 13:02 )   PT: 14.8 sec;   INR: 1.28 ratio;       PTT:26.9 sec        Venous Blood Gas:   @ 13:02  7.40/50/<//23  VBG Lactate: 1.4        Urinalysis Basic - ( 2019 15:44 )    Color: Light Yellow / Appearance: Clear / S.013 / pH: x  Gluc: x / Ketone: Negative  / Bili: Negative / Urobili: Negative   Blood: x / Protein: Negative / Nitrite: Negative   Leuk Esterase: Negative / RBC: 9 /hpf / WBC 0 /HPF   Sq Epi: x / Non Sq Epi: 0 /hpf / Bacteria: Negative    Labs personally reviewed: Borderline leukocytosis, Cr at baseline w/ GFR ~50-60, lactate WNL.    Radiology personally reviewed:  < from: Xray Wrist 3 Views, Right (19 @ 13:37) >      IMPRESSION:   The bones are diffusely demineralized. No discrete fracture is   visualized. There is widening of the scapholunate ligament, consistent   with ligamentous insufficiency-prior injury. There is chondrocalcinosis.   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 There is soft tissue   swelling about the carpus.      < end of copied text >

## 2019-06-20 NOTE — ED PROVIDER NOTE - ATTENDING CONTRIBUTION TO CARE
89 yo F presents with 2 days of R wrist pain, redness and swelling. reports moderate severe pain. pain and swelling worsening over the past 2days. unclear if there was an injury. pt lives with the daughter who states that she returned home yesterday and pain with swollen wrist. patient has previously fallen with injuries in the past. patient is at her baseline, AAOx3. usually walks with walker.   + generazlied weakness.    no reported fevers.   no uri symptosm, ehadache, neck pan, cp, sob, abd pain, N/V/D, hip pain, focal neuro deficits  PE well appearing. lungs CTA. abd soft and NT. R Wrist with redness, swelling, TTP. 2+ PP no hip TTP. full rom of bl leg without restriction. we tried to have pt ambulate and she was unable to from generalized weakness. 89 yo F presents with 2 days of R wrist pain, redness and swelling. reports moderate severe pain. pain and swelling worsening over the past 2days. unclear if there was an injury. pt lives with the daughter who states that she returned home yesterday and pt complained of pain in the wrist with new swollening. patient has previously fallen with injuries in the past. patient is at her baseline, AAOx3. usually walks with walker.   + generazlied weakness.    no reported fevers.   no uri symptosm, ehadache, neck pan, cp, sob, abd pain, N/V/D, hip pain, focal neuro deficits  PE well appearing. lungs CTA. abd soft and NT. R Wrist with redness, swelling, TTP. limited ROM of the wrist secondary to pain and swelling. 2+ PP. no hip TTP. full rom of bl leg without restriction. AAOx3, moving all extremities with 5/5 strength, sensation intact.   but we tried to have pt ambulate and she was unable to from generalized weakness. no focal or unilateral weakness appreciated.      CT head with no acute findings.  X-ray pelvis with no acute fracture. x-rays of the wrist with severe arthritis, no discrete acute fracture.  Urinalysis negative. Elevated ESR.  wrist swelling-  unclear whether trauma versus inflammatory versus septic.    Patient was started on empiric antibiotics  to cover for cellulitis/septic joint.  We consulted hand surgery due to concern for possible septic joint. Dr. Gonzalez  Came to evaluate patient in the ER, not concerned for subject on, does not believe patient needs a  diagnostic joint aspiration.  he recommended that patient be admitted to the hospital service.  Patient was admitted in stable condition    I reviewed all lab and imaging results from this ED visit, and discussed ALL results with the patient and/or family, including all abnormal results and incidental findings. I addressed all of patient's/family's questions and concerns, and I recommended appropriate follow up for all incidental findings. Based on patient's HPI as well as the results of today's workup, I felt that patient warranted admission to the hospital for further workup/evaluation and continued management. The patient was agreeable with admission. Patient was signed out to admitting team. Admitting team accepted the patient for admission and subsequently took over the patient's care in its entirety. 89 yo F presents with 2 days of R wrist pain, redness and swelling. reports moderate severe pain. pain and swelling worsening over the past 2days. unclear if there was an injury. pt lives with the daughter who states that she returned home yesterday and pt complained of pain in the wrist with new swollening. patient has previously fallen with injuries in the past. patient is at her baseline, AAOx3. usually walks with walker.   + generazlied weakness.    no reported fevers.   no uri symptosm, ehadache, neck pan, cp, sob, abd pain, N/V/D, hip pain, focal neuro deficits  PE well appearing. lungs CTA. abd soft and NT. R Wrist with redness, swelling, TTP. limited ROM of the wrist secondary to pain and swelling. 2+ PP. no hip TTP. full rom of bl leg without restriction. AAOx3, moving all extremities with 5/5 strength, sensation intact.   but we tried to have pt ambulate and she was unable to from generalized weakness. no focal or unilateral weakness appreciated.      CT head with no acute findings.  X-ray pelvis with no acute fracture. x-rays of the wrist with severe arthritis, no discrete acute fracture.  Urinalysis negative. Elevated ESR.  wrist swelling-  unclear whether trauma versus inflammatory versus septic.    Patient was given empiric antibiotics  to cover for cellulitis/septic joint with ancef/vancomycin in the er.   We consulted hand surgery due to concern for possible septic joint and need for washout in the OR. Dr. Gonzalez  Came to evaluate patient in the ER, not concerned for septic arthritis, does not believe patient needs a  diagnostic joint aspiration or washout.  he recommended that patient be admitted to the hospital service for continued iv abx.  Patient was admitted in stable condition    I reviewed all lab and imaging results from this ED visit, and discussed ALL results with the patient and/or family, including all abnormal results and incidental findings. I addressed all of patient's/family's questions and concerns, and I recommended appropriate follow up for all incidental findings. Based on patient's HPI as well as the results of today's workup, I felt that patient warranted admission to the hospital for further workup/evaluation and continued management. The patient was agreeable with admission. Patient was signed out to admitting team. Admitting team accepted the patient for admission and subsequently took over the patient's care in its entirety. 89 yo F presents with 2 days of R wrist pain, redness and swelling. reports moderate severe pain. pain and swelling worsening over the past 2days. unclear if there was an injury. pt lives with the daughter who states that she returned home yesterday and pt complained of pain in the wrist with new swollening. patient has previously fallen with injuries in the past. patient is at her baseline, AAOx3. usually walks with walker.   + generazlied weakness.    no reported fevers.   no uri symptosm, ehadache, neck pan, cp, sob, abd pain, N/V/D, hip pain, focal neuro deficits  PE well appearing. lungs CTA. abd soft and NT. R Wrist with redness, swelling, TTP. limited ROM of the wrist secondary to pain and swelling. 2+ PP. no hip TTP. full rom of bl leg without restriction. AAOx3, moving all extremities with 5/5 strength, sensation intact.   but we tried to have pt ambulate and she was unable to from generalized weakness. no focal or unilateral weakness appreciated.      CT head with no acute findings.  X-ray pelvis with no acute fracture. x-rays of the wrist with severe arthritis, no discrete acute fracture.  Urinalysis negative. Elevated ESR.  wrist swelling-  unclear whether trauma versus inflammatory versus septic.    Patient was given empiric antibiotics  to cover for cellulitis/septic joint with ancef/vancomycin in the er.   We consulted hand surgery due to concern for possible septic joint and need for joint aspiration and/or washout in the OR. Dr. Gonzalez  Came to evaluate patient in the ER, not concerned for septic arthritis, does not believe patient needs a  diagnostic joint aspiration or washout.  he recommended that patient be admitted to the hospital service for continued iv abx.  Patient was admitted in stable condition    I reviewed all lab and imaging results from this ED visit, and discussed ALL results with the patient and/or family, including all abnormal results and incidental findings. I addressed all of patient's/family's questions and concerns, and I recommended appropriate follow up for all incidental findings. Based on patient's HPI as well as the results of today's workup, I felt that patient warranted admission to the hospital for further workup/evaluation and continued management. The patient was agreeable with admission. Patient was signed out to admitting team. Admitting team accepted the patient for admission and subsequently took over the patient's care in its entirety.

## 2019-06-20 NOTE — H&P ADULT - ATTENDING COMMENTS
Plan d/w patient and her daughter (Cathryn) at bedside. d/w hand surgery (Dr. Gonzalez), ID (Dr. Wilson), medicine PA (Robbin).    Bharath Cornejo M.D.  Hospitalist  Pager: [phone]

## 2019-06-21 DIAGNOSIS — R41.0 DISORIENTATION, UNSPECIFIED: ICD-10-CM

## 2019-06-21 LAB
ANION GAP SERPL CALC-SCNC: 13 MMOL/L — SIGNIFICANT CHANGE UP (ref 5–17)
BASOPHILS # BLD AUTO: 0.04 K/UL — SIGNIFICANT CHANGE UP (ref 0–0.2)
BASOPHILS NFR BLD AUTO: 0.5 % — SIGNIFICANT CHANGE UP (ref 0–2)
BUN SERPL-MCNC: 13 MG/DL — SIGNIFICANT CHANGE UP (ref 7–23)
CALCIUM SERPL-MCNC: 9 MG/DL — SIGNIFICANT CHANGE UP (ref 8.4–10.5)
CHLORIDE SERPL-SCNC: 96 MMOL/L — SIGNIFICANT CHANGE UP (ref 96–108)
CO2 SERPL-SCNC: 27 MMOL/L — SIGNIFICANT CHANGE UP (ref 22–31)
CREAT SERPL-MCNC: 0.41 MG/DL — LOW (ref 0.5–1.3)
CULTURE RESULTS: NO GROWTH — SIGNIFICANT CHANGE UP
EOSINOPHIL # BLD AUTO: 0.05 K/UL — SIGNIFICANT CHANGE UP (ref 0–0.5)
EOSINOPHIL NFR BLD AUTO: 0.6 % — SIGNIFICANT CHANGE UP (ref 0–6)
GLUCOSE SERPL-MCNC: 97 MG/DL — SIGNIFICANT CHANGE UP (ref 70–99)
HCT VFR BLD CALC: 40.2 % — SIGNIFICANT CHANGE UP (ref 34.5–45)
HGB BLD-MCNC: 13.3 G/DL — SIGNIFICANT CHANGE UP (ref 11.5–15.5)
IMM GRANULOCYTES NFR BLD AUTO: 0.2 % — SIGNIFICANT CHANGE UP (ref 0–1.5)
LYMPHOCYTES # BLD AUTO: 0.95 K/UL — LOW (ref 1–3.3)
LYMPHOCYTES # BLD AUTO: 10.7 % — LOW (ref 13–44)
MAGNESIUM SERPL-MCNC: 1.8 MG/DL — SIGNIFICANT CHANGE UP (ref 1.6–2.6)
MCHC RBC-ENTMCNC: 30.7 PG — SIGNIFICANT CHANGE UP (ref 27–34)
MCHC RBC-ENTMCNC: 33.1 GM/DL — SIGNIFICANT CHANGE UP (ref 32–36)
MCV RBC AUTO: 92.8 FL — SIGNIFICANT CHANGE UP (ref 80–100)
MONOCYTES # BLD AUTO: 0.96 K/UL — HIGH (ref 0–0.9)
MONOCYTES NFR BLD AUTO: 10.8 % — SIGNIFICANT CHANGE UP (ref 2–14)
NEUTROPHILS # BLD AUTO: 6.86 K/UL — SIGNIFICANT CHANGE UP (ref 1.8–7.4)
NEUTROPHILS NFR BLD AUTO: 77.2 % — HIGH (ref 43–77)
PHOSPHATE SERPL-MCNC: 2.6 MG/DL — SIGNIFICANT CHANGE UP (ref 2.5–4.5)
PLATELET # BLD AUTO: 171 K/UL — SIGNIFICANT CHANGE UP (ref 150–400)
POTASSIUM SERPL-MCNC: 3.4 MMOL/L — LOW (ref 3.5–5.3)
POTASSIUM SERPL-SCNC: 3.4 MMOL/L — LOW (ref 3.5–5.3)
RBC # BLD: 4.33 M/UL — SIGNIFICANT CHANGE UP (ref 3.8–5.2)
RBC # FLD: 14.6 % — HIGH (ref 10.3–14.5)
SODIUM SERPL-SCNC: 136 MMOL/L — SIGNIFICANT CHANGE UP (ref 135–145)
SPECIMEN SOURCE: SIGNIFICANT CHANGE UP
WBC # BLD: 8.88 K/UL — SIGNIFICANT CHANGE UP (ref 3.8–10.5)
WBC # FLD AUTO: 8.88 K/UL — SIGNIFICANT CHANGE UP (ref 3.8–10.5)

## 2019-06-21 PROCEDURE — 99233 SBSQ HOSP IP/OBS HIGH 50: CPT | Mod: GC

## 2019-06-21 PROCEDURE — 99222 1ST HOSP IP/OBS MODERATE 55: CPT | Mod: GC

## 2019-06-21 PROCEDURE — 73221 MRI JOINT UPR EXTREM W/O DYE: CPT | Mod: 26,RT

## 2019-06-21 RX ORDER — CEFAZOLIN SODIUM 1 G
1000 VIAL (EA) INJECTION EVERY 8 HOURS
Refills: 0 | Status: DISCONTINUED | OUTPATIENT
Start: 2019-06-21 | End: 2019-06-22

## 2019-06-21 RX ORDER — QUETIAPINE FUMARATE 200 MG/1
12.5 TABLET, FILM COATED ORAL ONCE
Refills: 0 | Status: COMPLETED | OUTPATIENT
Start: 2019-06-21 | End: 2019-06-21

## 2019-06-21 RX ORDER — ACETAMINOPHEN 500 MG
650 TABLET ORAL EVERY 6 HOURS
Refills: 0 | Status: DISCONTINUED | OUTPATIENT
Start: 2019-06-21 | End: 2019-06-24

## 2019-06-21 RX ORDER — VANCOMYCIN HCL 1 G
1000 VIAL (EA) INTRAVENOUS ONCE
Refills: 0 | Status: COMPLETED | OUTPATIENT
Start: 2019-06-21 | End: 2019-06-21

## 2019-06-21 RX ORDER — QUETIAPINE FUMARATE 200 MG/1
12.5 TABLET, FILM COATED ORAL EVERY 12 HOURS
Refills: 0 | Status: DISCONTINUED | OUTPATIENT
Start: 2019-06-21 | End: 2019-06-28

## 2019-06-21 RX ORDER — POTASSIUM CHLORIDE 20 MEQ
20 PACKET (EA) ORAL ONCE
Refills: 0 | Status: COMPLETED | OUTPATIENT
Start: 2019-06-21 | End: 2019-06-21

## 2019-06-21 RX ADMIN — Medication 1 DROP(S): at 05:40

## 2019-06-21 RX ADMIN — HEPARIN SODIUM 5000 UNIT(S): 5000 INJECTION INTRAVENOUS; SUBCUTANEOUS at 05:40

## 2019-06-21 RX ADMIN — QUETIAPINE FUMARATE 12.5 MILLIGRAM(S): 200 TABLET, FILM COATED ORAL at 10:46

## 2019-06-21 RX ADMIN — Medication 250 MILLIGRAM(S): at 18:27

## 2019-06-21 RX ADMIN — Medication 1 DROP(S): at 18:27

## 2019-06-21 RX ADMIN — HEPARIN SODIUM 5000 UNIT(S): 5000 INJECTION INTRAVENOUS; SUBCUTANEOUS at 18:27

## 2019-06-21 RX ADMIN — Medication 20 MILLIEQUIVALENT(S): at 10:46

## 2019-06-21 RX ADMIN — QUETIAPINE FUMARATE 12.5 MILLIGRAM(S): 200 TABLET, FILM COATED ORAL at 22:53

## 2019-06-21 RX ADMIN — Medication 100 MILLIGRAM(S): at 22:58

## 2019-06-21 RX ADMIN — SIMVASTATIN 10 MILLIGRAM(S): 20 TABLET, FILM COATED ORAL at 22:54

## 2019-06-21 RX ADMIN — Medication 100 MILLIGRAM(S): at 00:07

## 2019-06-21 NOTE — CONSULT NOTE ADULT - ASSESSMENT
89 y/o woman w/ PMH HLD, mild dementia, R cataract c/b corneal injury, ?prior subclinical L basal ganglia CVA p/w R wrist pain and swelling x 2 days, concerning for possible wrist cellulitis vs gout vs septic arthitis vs fracture.   No documented fevers   Mild leukocytosis  Exam concerning for possible septic arthritis vs acute gout flare  Imaging does not show acute fracture   Blood cultures sent and are pending   Pending Hand MRI     Overall, elderly female with red, hot tender right hand possibly cellulitis vs septic arthritis vs gout     Recommendations:  -Change Cefazolin to 1g q8hrs   -Give one dose of Vancomycin until blood cx results   -follow up on blood cx results  -MRI Hand with gadolinium  -Rheumatology consult with consideration for arthrocentesis   -If performing arthrocentesis, send cell count, crystal analysis, gram stain and culture   -hold steroids until gout/pseudogout has been diagnoses and septic arthritis ruled out

## 2019-06-21 NOTE — CONSULT NOTE ADULT - SUBJECTIVE AND OBJECTIVE BOX
PEDRO BYRD  [id number]    HISTORY OF PRESENT ILLNESS:    89 yo F hx HLD, dementia p/w R wrist pain and swelling.    Patient unable to participate in hx, info obtained from chart.     Patient had R wrist pain which gradually worsened when trying to use a walker or with movement. Noted erythema around the wrist area as well. Patient's ability to do ADLs was impacted, and she presented for further evaluation.    PAST MEDICAL & SURGICAL HISTORY:  Wrist fracture  Hip fracture  HTN (Hypertension)  Cerebrovascular Accident (Stroke)  Cataract  Status Post Small Bowel Resection  C Section  S/P Exploratory Laparotomy      Review of Systems:  unable to obtain    MEDICATIONS  (STANDING):  ceFAZolin   IVPB 1000 milliGRAM(s) IV Intermittent every 8 hours  heparin  Injectable 5000 Unit(s) SubCutaneous every 12 hours  prednisoLONE acetate 1% Suspension 1 Drop(s) Both EYES every 12 hours  simvastatin 10 milliGRAM(s) Oral at bedtime    MEDICATIONS  (PRN):  acetaminophen   Tablet .. 650 milliGRAM(s) Oral every 6 hours PRN Mild Pain (1 - 3), Moderate Pain (4 - 6)  QUEtiapine 12.5 milliGRAM(s) Oral every 12 hours PRN agitation      Allergies    No Known Allergies    Intolerances    penicillin (Stomach Upset)    FAMILY HISTORY:  Paternal family history of hypertension      Vital Signs Last 24 Hrs  T(C): 36.3 (2019 19:37), Max: 37 (2019 22:37)  T(F): 97.3 (2019 19:37), Max: 98.6 (2019 22:37)  HR: 89 (2019 19:37) (69 - 89)  BP: 162/72 (2019 19:37) (159/74 - 177/81)  BP(mean): --  RR: 18 (2019 19:37) (18 - 19)  SpO2: 94% (2019 19:37) (93% - 94%)    Physical Exam:  General: uncooperative, in mild distress  MSK: R wrist with swelling, particularly over the palmar aspect, limited ROM, warmth, erythema  Neuro: agitated  Skin: erythema over the R wrist    LABS:                        13.3   8.88  )-----------( 171      ( 2019 09:23 )             40.2     06-21    136  |  96  |  13  ----------------------------<  97  3.4<L>   |  27  |  0.41<L>    Ca    9.0      2019 07:14  Phos  2.6       Mg     1.8         TPro  7.6  /  Alb  4.0  /  TBili  0.7  /  DBili  x   /  AST  11  /  ALT  9<L>  /  AlkPhos  124<H>      PT/INR - ( 2019 13:02 )   PT: 14.8 sec;   INR: 1.28 ratio         PTT - ( 2019 13:02 )  PTT:26.9 sec  Urinalysis Basic - ( 2019 15:44 )    Color: Light Yellow / Appearance: Clear / S.013 / pH: x  Gluc: x / Ketone: Negative  / Bili: Negative / Urobili: Negative   Blood: x / Protein: Negative / Nitrite: Negative   Leuk Esterase: Negative / RBC: 9 /hpf / WBC 0 /HPF   Sq Epi: x / Non Sq Epi: 0 /hpf / Bacteria: Negative        RADIOLOGY & ADDITIONAL STUDIES:    EXAM:  WRIST COMPLETE RIGHT-MIN 3 VIEWS                            PROCEDURE DATE:  2019            INTERPRETATION:  CLINICAL INDICATION: Erythema and swelling along the   dorsal wrist. Status post fall.    EXAM: PA, lateral and oblique viewsof the right wrist    COMPARISON: None      IMPRESSION:   The bones are diffusely demineralized. No discrete fracture is   visualized. There is widening of the scapholunate ligament, consistent   with ligamentous insufficiency-prior injury. There is chondrocalcinosis.   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 There is soft tissue   swelling about the carpus.

## 2019-06-21 NOTE — CONSULT NOTE ADULT - SUBJECTIVE AND OBJECTIVE BOX
Patient is a 89y old  Female who presents with a chief complaint of r/o R wrist infection (2019 18:12)      HPI:  89 y/o woman w/ PMH HLD, mild dementia, R cataract c/b corneal injury, ?prior subclinical L basal ganglia CVA p/w R wrist pain and swelling x 2 days. Patient reports she began feeling a subtle  dull pain in her R wrist while at home trying to use her walker. She was unable to put weight on her R hand. She noticed "redness" on tracking from just distal to her elbow to her R hand, symptoms that progressed over the past 24 hours. Pain is rated 6/10 at its worst. It is exacerbated with any movement of the R wrist (flexion/extension/abduction/adduction) and has some relief with tylenol. She has had some difficulty w/ finger extension, as well. Reports some mild chills, but no fevers. Denies any preceding skin tear/breakdown in the R wrist area. No drainage or pus. Denies recent falls, no wrist trauma. She has no h/o gout or pseudogout. ROS is otherwise unremarkable. Patient has adequate functional status at baseline, ambulates w/ walker and lives with her daughter in an apartment. No dysphagia. Daughter reports mental status is at her baseline, w/ some very mild confusion at times but generally independent in her ADLs and many IADLs. (2019 17:18)      PAST MEDICAL & SURGICAL HISTORY:  Wrist fracture  Hip fracture  HTN (Hypertension)  Cerebrovascular Accident (Stroke)  Cataract  Status Post Small Bowel Resection  C Section  S/P Exploratory Laparotomy      Allergies  No Known Allergies        ANTIMICROBIALS:  ceFAZolin   IVPB 1000 every 12 hours      MEDICATIONS  (STANDING):  ceFAZolin   IVPB   100 mL/Hr IV Intermittent (19 @ 13:30)    ceFAZolin   IVPB   100 mL/Hr IV Intermittent (19 @ 00:07)        OTHER MEDS: MEDICATIONS  (STANDING):  heparin  Injectable 5000 every 12 hours  predniSONE   Tablet 20 daily  QUEtiapine 12.5 once  simvastatin 10 at bedtime      SOCIAL HISTORY:     No smoking or alcohol use     FAMILY HISTORY:  Paternal family history of hypertension      REVIEW OF SYSTEMS  [  ] ROS unobtainable because:    [ X] All other systems negative except as noted below:	    Constitutional:  [ ] fever [ ] chills  [ ] weight loss  [ ] weakness  Skin:  [ ] rash [ ] phlebitis	  Eyes: [ ] icterus [ ] pain  [ ] discharge	  ENMT: [ ] sore throat  [ ] thrush [ ] ulcers [ ] exudates  Respiratory: [ ] dyspnea [ ] hemoptysis [ ] cough [ ] sputum	  Cardiovascular:  [ ] chest pain [ ] palpitations [ ] edema	  Gastrointestinal:  [ ] nausea [ ] vomiting [ ] diarrhea [ ] constipation [ ] pain	  Genitourinary:  [ ] dysuria [ ] frequency [ ] hematuria [ ] discharge [ ] flank pain  [ ] incontinence  Musculoskeletal:  [ ] myalgias [ ] arthralgias [ ] arthritis  [ X] wrist pain [x] wrist swelling  Neurological:  [ ] headache [ ] seizures  [ ] confusion/altered mental status  Psychiatric:  [ ] anxiety [ ] depression	  Hematology/Lymphatics:  [ ] lymphadenopathy  Endocrine:  [ ] adrenal [ ] thyroid  Allergic/Immunologic:	 [ ] transplant [ ] seasonal    Vital Signs Last 24 Hrs  T(F): 98.1 (19 @ 08:23), Max: 100.2 (19 @ 13:43)    Vital Signs Last 24 Hrs  HR: 73 (19 @ 08:23) (64 - 87)  BP: 159/74 (19 @ 08:23) (137/71 - 177/81)  RR: 18 (19 @ 08:23)  SpO2: 94% (19 @ 08:23) (93% - 96%)  Wt(kg): --    PHYSICAL EXAM:  General: non-toxic  HEAD/EYES: anicteric, PERRL  ENT:  supple  Cardiovascular:   S1, S2  Respiratory:  clear bilaterally  GI:  soft, non-tender, normal bowel sounds  :  no CVA tenderness   Musculoskeletal:  no synovitis  Neurologic:  grossly non-focal  Skin:  no rash  Lymph: no lymphadenopathy  Psychiatric:  appropriate affect  Vascular:  no phlebitis          WBC Count: 8.88 K/uL ( @ 09:23)  WBC Count: 10.8 K/uL ( @ 13:02)                            13.3   8.88  )-----------( 171      ( 2019 09:23 )             40.2           136  |  96  |  13  ----------------------------<  97  3.4<L>   |  27  |  0.41<L>    Ca    9.0      2019 07:14  Phos  2.6       Mg     1.8     -    TPro  7.6  /  Alb  4.0  /  TBili  0.7  /  DBili  x   /  AST  11  /  ALT  9<L>  /  AlkPhos  124<H>        Creatinine Trend: 0.41<--, 0.48<--    Urinalysis Basic - ( 2019 15:44 )    Color: Light Yellow / Appearance: Clear / S.013 / pH: x  Gluc: x / Ketone: Negative  / Bili: Negative / Urobili: Negative   Blood: x / Protein: Negative / Nitrite: Negative   Leuk Esterase: Negative / RBC: 9 /hpf / WBC 0 /HPF   Sq Epi: x / Non Sq Epi: 0 /hpf / Bacteria: Negative        MICROBIOLOGY:      RADIOLOGY:  < from: Xray Pelvis AP only (19 @ 13:37) >  IMPRESSION:   No acute fractures visualized. Status post left femoral ORIF. Evaluation   of the sacrum is limited due to overlying bowel. Soft tissue density   overlies the pelvis, which likely represents fluid-filled bladder and   stool-filled rectum.    < end of copied text >    < from: Xray Wrist 3 Views, Right (19 @ 13:37) >  IMPRESSION:   The bones are diffusely demineralized. No discrete fracture is   visualized. There is widening of the scapholunate ligament, consistent   with ligamentous insufficiency-prior injury. There is chondrocalcinosis.   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 There is soft tissue   swelling about the carpus.    < end of copied text > Patient is a 89y old  Female who presents with a chief complaint of r/o R wrist infection (2019 18:12)      HPI:  89 y/o woman w/ PMH HLD, mild dementia, R cataract c/b corneal injury, ?prior subclinical L basal ganglia CVA p/w R wrist pain and swelling x 2 days. Patient reports she began feeling a subtle  dull pain in her R wrist while at home trying to use her walker. She was unable to put weight on her R hand. She noticed "redness" on tracking from just distal to her elbow to her R hand, symptoms that progressed over the past 24 hours. Pain is rated 6/10 at its worst. It is exacerbated with any movement of the R wrist (flexion/extension/abduction/adduction) and has some relief with tylenol. She has had some difficulty w/ finger extension, as well. Reports some mild chills, but no fevers. Denies any preceding skin tear/breakdown in the R wrist area. No drainage or pus. Denies recent falls, no wrist trauma. She has no h/o gout or pseudogout. ROS is otherwise unremarkable. Patient has adequate functional status at baseline, ambulates w/ walker and lives with her daughter in an apartment. No dysphagia. Daughter reports mental status is at her baseline, w/ some very mild confusion at times but generally independent in her ADLs and many IADLs. (2019 17:18)    Above reviewed. Pt denies anything wrong with her hand but when one examines it she screams in pain.   ID consulted for antibiotics management       PAST MEDICAL & SURGICAL HISTORY:  Wrist fracture  Hip fracture  HTN (Hypertension)  Cerebrovascular Accident (Stroke)  Cataract  Status Post Small Bowel Resection  C Section  S/P Exploratory Laparotomy      Allergies  No Known Allergies        ANTIMICROBIALS:  ceFAZolin   IVPB 1000 every 12 hours      MEDICATIONS  (STANDING):  ceFAZolin   IVPB   100 mL/Hr IV Intermittent (19 @ 13:30)    ceFAZolin   IVPB   100 mL/Hr IV Intermittent (19 @ 00:07)        OTHER MEDS: MEDICATIONS  (STANDING):  heparin  Injectable 5000 every 12 hours  predniSONE   Tablet 20 daily  QUEtiapine 12.5 once  simvastatin 10 at bedtime      SOCIAL HISTORY:     No smoking or alcohol use     FAMILY HISTORY:  Paternal family history of hypertension      REVIEW OF SYSTEMS  [  ] ROS unobtainable because:    [ X] All other systems negative except as noted below:	    Constitutional:  [ ] fever [ ] chills  [ ] weight loss  [ ] weakness  Skin:  [ ] rash [ ] phlebitis	  Eyes: [ ] icterus [ ] pain  [ ] discharge	  ENMT: [ ] sore throat  [ ] thrush [ ] ulcers [ ] exudates  Respiratory: [ ] dyspnea [ ] hemoptysis [ ] cough [ ] sputum	  Cardiovascular:  [ ] chest pain [ ] palpitations [ ] edema	  Gastrointestinal:  [ ] nausea [ ] vomiting [ ] diarrhea [ ] constipation [ ] pain	  Genitourinary:  [ ] dysuria [ ] frequency [ ] hematuria [ ] discharge [ ] flank pain  [ ] incontinence  Musculoskeletal:  [ ] myalgias [ ] arthralgias [ ] arthritis  [ X] wrist pain [x] wrist swelling  Neurological:  [ ] headache [ ] seizures  [ ] confusion/altered mental status  Psychiatric:  [ ] anxiety [ ] depression	  Hematology/Lymphatics:  [ ] lymphadenopathy  Endocrine:  [ ] adrenal [ ] thyroid  Allergic/Immunologic:	 [ ] transplant [ ] seasonal    Vital Signs Last 24 Hrs  T(F): 98.1 (19 @ 08:23), Max: 100.2 (19 @ 13:43)    Vital Signs Last 24 Hrs  HR: 73 (19 @ 08:23) (64 - 87)  BP: 159/74 (19 @ 08:23) (137/71 - 177/81)  RR: 18 (19 @ 08:23)  SpO2: 94% (19 @ 08:23) (93% - 96%)  Wt(kg): --    PHYSICAL EXAM:  General: non-toxic, confused, frail elderly lady   HEAD/EYES: anicteric, PERRL  ENT:  supple  Cardiovascular:   S1, S2  Respiratory:  clear bilaterally  GI:  soft, non-tender, normal bowel sounds  :  no CVA tenderness   Musculoskeletal:  right hand is edematous, hot, erythematous and exquisitely tender to even light palpation  Neurologic:  alert and oriented but at times confused   Skin:  no rash  Lymph: no lymphadenopathy  Psychiatric:  appropriate affect  Vascular:  no phlebitis          WBC Count: 8.88 K/uL ( @ 09:23)  WBC Count: 10.8 K/uL ( @ 13:02)                            13.3   8.88  )-----------( 171      ( 2019 09:23 )             40.2           136  |  96  |  13  ----------------------------<  97  3.4<L>   |  27  |  0.41<L>    Ca    9.0      2019 07:14  Phos  2.6       Mg     1.8         TPro  7.6  /  Alb  4.0  /  TBili  0.7  /  DBili  x   /  AST  11  /  ALT  9<L>  /  AlkPhos  124<H>        Creatinine Trend: 0.41<--, 0.48<--    Urinalysis Basic - ( 2019 15:44 )    Color: Light Yellow / Appearance: Clear / S.013 / pH: x  Gluc: x / Ketone: Negative  / Bili: Negative / Urobili: Negative   Blood: x / Protein: Negative / Nitrite: Negative   Leuk Esterase: Negative / RBC: 9 /hpf / WBC 0 /HPF   Sq Epi: x / Non Sq Epi: 0 /hpf / Bacteria: Negative        MICROBIOLOGY:      RADIOLOGY:  < from: Xray Pelvis AP only (19 @ 13:37) >  IMPRESSION:   No acute fractures visualized. Status post left femoral ORIF. Evaluation   of the sacrum is limited due to overlying bowel. Soft tissue density   overlies the pelvis, which likely represents fluid-filled bladder and   stool-filled rectum.    < end of copied text >    < from: Xray Wrist 3 Views, Right (19 @ 13:37) >  IMPRESSION:   The bones are diffusely demineralized. No discrete fracture is   visualized. There is widening of the scapholunate ligament, consistent   with ligamentous insufficiency-prior injury. There is chondrocalcinosis.   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 There is soft tissue   swelling about the carpus.    < end of copied text >

## 2019-06-21 NOTE — PROGRESS NOTE ADULT - PROBLEM SELECTOR PLAN 1
Initial concern for right wrist joint infection. Pt was seen by hand surgery whom feels infection is less likely and will hold on arthrocentesis for now. Pt remains afebrile with no significant WBC count or ESR. CRP is elevated. Was placed on ancef for presumed infection. MRI right wrist pending. Infectious process for inflammatory (ie; gout)  - Check MRI R wrist w/ contrast (d/w radiology, d/w patient's daughter, Cathryn; pt w/o MRI contraindications)  - C/w Ancef 1g IV q12h for now (renal dosing)  - Started Prednisone at low dose to lessen worsening of delirium (20mg QD x 5 days THEN 10mg QD x 5days)  - Awaiting ID Eval

## 2019-06-21 NOTE — PROGRESS NOTE ADULT - ASSESSMENT
89yo woman w/ PMH HLD, mild dementia, R cataract c/b corneal injury, ?prior subclinical L basal ganglia CVA p/w R wrist pain and swelling x 2 days, concerning for possible wrist infection vs gout flare

## 2019-06-21 NOTE — CONSULT NOTE ADULT - ATTENDING COMMENTS
Patient seen and examined  Above verified  Agree with above unless as noted below.  89 y/o woman w/ PMH HLD, mild dementia, R cataract c/b corneal injury, ?prior subclinical L basal ganglia CVA p/w R wrist pain and swelling x 2 days, concerning for possible wrist cellulitis vs gout vs septic arthitis vs fracture.   Stable  Rec:  1) check blood Cx  2) MRI hand  3) rheum eval ? aspiration   4) Empiric IV ancef + vanco x 1  5) Would rec hold steroids till septic arthritis ruled out   Will tailor plan for ID issues  per course,results.Will defer to primary team on management of other issues.  Case d/w primary team  Infectious Diseases Service will cover over weekend.  Please call [phone] if issues

## 2019-06-21 NOTE — CONSULT NOTE ADULT - ASSESSMENT
89 yo F hx HLD, dementia p/w R wrist pain and swelling.  Significant swelling of R wrist with effusion, warmth, restricted ROM.  Concerning for septic arthritis, would continue antibiotics for now, no steroids.    - given the location of the fluid being on the palmar aspect of the R wrist, would defer arthrocentesis to either [facility]ology for ultrasound guided aspiration or aspiration with ortho hand or plastics  - attempted to contact daughter to discuss patient's hx but was not able to reach her  - MRI wrist ordered    Asim Castaneda MD  Rheumatology Fellow PGY IV 89 yo F hx HLD, dementia p/w R wrist pain and swelling.  Significant swelling of R wrist with effusion, warmth, restricted ROM.  Concerning for septic arthritis, would continue antibiotics for now, no steroids.    - given the location of the fluid being on the palmar aspect of the R wrist, would defer arthrocentesis to either [facility] radiology for ultrasound guided aspiration or aspiration with ortho hand or plastics  - attempted to contact daughter to discuss patient's hx  and possible aspiration but was not able to reach her  - MRI wrist pending    Asim Castaneda MD  Rheumatology Fellow PGY IV

## 2019-06-21 NOTE — PROGRESS NOTE ADULT - PROBLEM SELECTOR PLAN 2
Pt with reported history of dementia as per documentation. Attempted to call daughter to obtain a more through social history but left VM. Pt now with delirium in setting of multiple factors (Hospital admission, pain, ?infection)  -Tylenol 650mg PO Q6H PRN Pain (First dose now)  -Seroquel 12.5mg PO X 1  -Frequent re-orientation Pt with reported history of dementia as per documentation. Attempted to call daughter to obtain a more through social history but left VM. Pt now with delirium in setting of multiple factors (Hospital admission, pain, ?infection)  -Tylenol 650mg PO Q6H PRN Pain (First dose now)  -will start Seroquel 12.5mg PO q 12 prn for agitation  -Frequent re-orientation

## 2019-06-21 NOTE — CONSULT NOTE ADULT - ATTENDING COMMENTS
Patient seen and examined at bedside. Agree with assessment and plan as above. Please call [phone] for any questions or concerns

## 2019-06-21 NOTE — CHART NOTE - NSCHARTNOTEFT_GEN_A_CORE
Medicine NP      concerning for possible wrist cellulitis vs gout vs septic arthritis vs fracture. C/w antibiotics hold steroids ( ID) until septic arthritis is ruled out.   Spoke to rheumatology()recommends plastic consultation to aspirate the joint.   spoke to radiologist , no own  currently to available to do any diagnostic aspiration . Need to call [phone] in am for [facility]ology to aspirate the wrist joint (ultrasound guided).    called Dr. Dave  (plastic ) service for consultation as recommended by Plastic resident ([phone]). Medicine NP      concerning for possible wrist cellulitis vs gout vs septic arthritis vs fracture. C/w antibiotics hold steroids ( ID) until septic arthritis is ruled out.   Spoke to rheumatology()recommends plastic consultation to aspirate the joint.   spoke to radiologist , no own  currently to available to do any diagnostic aspiration . Need to call [phone] in am for [facility]ology to aspirate the wrist joint (ultrasound guided).    called Dr. Dave  (plastic ) service for consultation as recommended by Plastic resident ([phone]).  Asked to call  ( ) who is on call, with who the case was discussed, awaiting MR imaging.

## 2019-06-21 NOTE — PROGRESS NOTE ADULT - SUBJECTIVE AND OBJECTIVE BOX
Patient is a 89y old  Female who presents with a chief complaint of r/o R wrist infection (2019 18:12)    SUBJECTIVE / OVERNIGHT EVENTS: Pt seen and examined. No acute events overnight. This AM, pt is confused and difficult to re-orientate. Pt trying to climb out of bed. Pt unable to provide a history or ROS as pt is a poor historian. Per nursing, pt is unable to be re-directed. Attempted to get in touch with patients daughter (Cathryn) whom Yocasta lives with but left a VM.       MEDICATIONS  (STANDING):  ceFAZolin   IVPB 1000 milliGRAM(s) IV Intermittent every 12 hours  heparin  Injectable 5000 Unit(s) SubCutaneous every 12 hours  prednisoLONE acetate 1% Suspension 1 Drop(s) Both EYES every 12 hours  predniSONE   Tablet 20 milliGRAM(s) Oral daily  QUEtiapine 12.5 milliGRAM(s) Oral once  simvastatin 10 milliGRAM(s) Oral at bedtime      Vital Signs Last 24 Hrs  T(C): 36.7 (2019 08:23), Max: 37.9 (2019 13:43)  T(F): 98.1 (2019 08:23), Max: 100.2 (2019 13:43)  HR: 73 (2019 08:23) (64 - 87)  BP: 159/74 (2019 08:23) (137/71 - 177/81)  RR: 18 (2019 08:23) (17 - 19)  SpO2: 94% (2019 08:23) (93% - 96%)      I&O's Summary  2019 07:01  -  2019 09:58  --------------------------------------------------------  IN: 120 mL / OUT: 0 mL / NET: 120 mL      PHYSICAL EXAM  GENERAL: Frail. Confused.   HEAD:  Atraumatic, Normocephalic  EYES: EOMI, PERRLA, conjunctiva and sclera clear  NECK: Supple, No JVD  CHEST/LUNG: Clear to auscultation bilaterally (Poor inspiratory effort and not following commands well and talking throughout exam thus exam limited)  HEART: Regular rate and rhythm; No murmurs, rubs, or gallops (As noted above exam limited 2/2 difficulty following commands)  ABDOMEN: Soft, Nontender, Nondistended; Bowel sounds present  EXTREMITIES: Right hand with erythema, warmth, tenderness and swelling that extends from the PIP joints to 3 inches above the wrist)  PSYCH: AAOx1 (Self). Non-cooperative. Agitated. Restless  SKIN: No rashes or lesions    LABS:             13.3   8.88  )-----------( 171      ( 2019 09:23 )             40.2     06-21    136  |  96  |  13  ----------------------------<  97  3.4<L>   |  27  |  0.41<L>    Ca    9.0      2019 07:14  Phos  2.6       Mg     1.8         TPro  7.6  /  Alb  4.0  /  TBili  0.7  /  DBili  x   /  AST  11  /  ALT  9<L>  /  AlkPhos  124<H>  06-20    PT/INR - ( 2019 13:02 )   PT: 14.8 sec;   INR: 1.28 ratio         PTT - ( 2019 13:02 )  PTT:26.9 sec      Urinalysis Basic - ( 2019 15:44 )    Color: Light Yellow / Appearance: Clear / S.013 / pH: x  Gluc: x / Ketone: Negative  / Bili: Negative / Urobili: Negative   Blood: x / Protein: Negative / Nitrite: Negative   Leuk Esterase: Negative / RBC: 9 /hpf / WBC 0 /HPF   Sq Epi: x / Non Sq Epi: 0 /hpf / Bacteria: Negative      RADIOLOGY & ADDITIONAL TESTS:    Imaging Personally Reviewed: Xray  Consultant(s) Notes Reviewed:  Hand Surgery  Care Discussed with Consultants/Other Providers:

## 2019-06-22 LAB
ANION GAP SERPL CALC-SCNC: 11 MMOL/L — SIGNIFICANT CHANGE UP (ref 5–17)
BASOPHILS # BLD AUTO: 0.04 K/UL — SIGNIFICANT CHANGE UP (ref 0–0.2)
BASOPHILS NFR BLD AUTO: 0.4 % — SIGNIFICANT CHANGE UP (ref 0–2)
BUN SERPL-MCNC: 12 MG/DL — SIGNIFICANT CHANGE UP (ref 7–23)
CALCIUM SERPL-MCNC: 9.2 MG/DL — SIGNIFICANT CHANGE UP (ref 8.4–10.5)
CHLORIDE SERPL-SCNC: 92 MMOL/L — LOW (ref 96–108)
CO2 SERPL-SCNC: 28 MMOL/L — SIGNIFICANT CHANGE UP (ref 22–31)
CREAT SERPL-MCNC: 0.42 MG/DL — LOW (ref 0.5–1.3)
EOSINOPHIL # BLD AUTO: 0.07 K/UL — SIGNIFICANT CHANGE UP (ref 0–0.5)
EOSINOPHIL NFR BLD AUTO: 0.8 % — SIGNIFICANT CHANGE UP (ref 0–6)
GLUCOSE SERPL-MCNC: 115 MG/DL — HIGH (ref 70–99)
HCT VFR BLD CALC: 44 % — SIGNIFICANT CHANGE UP (ref 34.5–45)
HGB BLD-MCNC: 14.6 G/DL — SIGNIFICANT CHANGE UP (ref 11.5–15.5)
IMM GRANULOCYTES NFR BLD AUTO: 0.4 % — SIGNIFICANT CHANGE UP (ref 0–1.5)
LYMPHOCYTES # BLD AUTO: 0.87 K/UL — LOW (ref 1–3.3)
LYMPHOCYTES # BLD AUTO: 9.3 % — LOW (ref 13–44)
MCHC RBC-ENTMCNC: 30.6 PG — SIGNIFICANT CHANGE UP (ref 27–34)
MCHC RBC-ENTMCNC: 33.2 GM/DL — SIGNIFICANT CHANGE UP (ref 32–36)
MCV RBC AUTO: 92.2 FL — SIGNIFICANT CHANGE UP (ref 80–100)
MONOCYTES # BLD AUTO: 1.11 K/UL — HIGH (ref 0–0.9)
MONOCYTES NFR BLD AUTO: 11.9 % — SIGNIFICANT CHANGE UP (ref 2–14)
NEUTROPHILS # BLD AUTO: 7.2 K/UL — SIGNIFICANT CHANGE UP (ref 1.8–7.4)
NEUTROPHILS NFR BLD AUTO: 77.2 % — HIGH (ref 43–77)
PLATELET # BLD AUTO: 180 K/UL — SIGNIFICANT CHANGE UP (ref 150–400)
POTASSIUM SERPL-MCNC: 3.8 MMOL/L — SIGNIFICANT CHANGE UP (ref 3.5–5.3)
POTASSIUM SERPL-SCNC: 3.8 MMOL/L — SIGNIFICANT CHANGE UP (ref 3.5–5.3)
PROCALCITONIN SERPL-MCNC: 0.15 NG/ML — HIGH (ref 0.02–0.1)
RBC # BLD: 4.77 M/UL — SIGNIFICANT CHANGE UP (ref 3.8–5.2)
RBC # FLD: 14.5 % — SIGNIFICANT CHANGE UP (ref 10.3–14.5)
SODIUM SERPL-SCNC: 131 MMOL/L — LOW (ref 135–145)
WBC # BLD: 9.33 K/UL — SIGNIFICANT CHANGE UP (ref 3.8–10.5)
WBC # FLD AUTO: 9.33 K/UL — SIGNIFICANT CHANGE UP (ref 3.8–10.5)

## 2019-06-22 PROCEDURE — 99233 SBSQ HOSP IP/OBS HIGH 50: CPT

## 2019-06-22 PROCEDURE — 99232 SBSQ HOSP IP/OBS MODERATE 35: CPT

## 2019-06-22 PROCEDURE — 73080 X-RAY EXAM OF ELBOW: CPT | Mod: 26,RT

## 2019-06-22 RX ORDER — CEFTRIAXONE 500 MG/1
1000 INJECTION, POWDER, FOR SOLUTION INTRAMUSCULAR; INTRAVENOUS EVERY 24 HOURS
Refills: 0 | Status: DISCONTINUED | OUTPATIENT
Start: 2019-06-22 | End: 2019-06-28

## 2019-06-22 RX ORDER — VANCOMYCIN HCL 1 G
1000 VIAL (EA) INTRAVENOUS DAILY
Refills: 0 | Status: DISCONTINUED | OUTPATIENT
Start: 2019-06-22 | End: 2019-06-24

## 2019-06-22 RX ORDER — PREDNISOLONE SODIUM PHOSPHATE 1 %
1 DROPS OPHTHALMIC (EYE) EVERY 12 HOURS
Refills: 0 | Status: DISCONTINUED | OUTPATIENT
Start: 2019-06-22 | End: 2019-06-28

## 2019-06-22 RX ADMIN — Medication 1 DROP(S): at 05:44

## 2019-06-22 RX ADMIN — Medication 100 MILLIGRAM(S): at 05:40

## 2019-06-22 RX ADMIN — Medication 250 MILLIGRAM(S): at 15:40

## 2019-06-22 RX ADMIN — Medication 1 DROP(S): at 17:40

## 2019-06-22 RX ADMIN — HEPARIN SODIUM 5000 UNIT(S): 5000 INJECTION INTRAVENOUS; SUBCUTANEOUS at 05:43

## 2019-06-22 RX ADMIN — SIMVASTATIN 10 MILLIGRAM(S): 20 TABLET, FILM COATED ORAL at 21:21

## 2019-06-22 RX ADMIN — CEFTRIAXONE 100 MILLIGRAM(S): 500 INJECTION, POWDER, FOR SOLUTION INTRAMUSCULAR; INTRAVENOUS at 13:50

## 2019-06-22 RX ADMIN — HEPARIN SODIUM 5000 UNIT(S): 5000 INJECTION INTRAVENOUS; SUBCUTANEOUS at 17:40

## 2019-06-22 NOTE — PROGRESS NOTE ADULT - ASSESSMENT
89 y/o woman w/ PMH HLD, mild dementia, R cataract c/b corneal injury, ?prior subclinical L basal ganglia CVA p/w R wrist pain and swelling x 2 days, concerning for possible wrist/forearm  cellulitis vs gout vs septic arthitis  MRI as above  Blood Cx negative  Rheum/plastics input noted  Rec:  1) follow blood Cx  2) continue IV vanco + ceftriaxone   3) ? aspiration if feasible  4) If no improvement would recommend surgical debridement/I&D  5) Would rec hold steroids   Will tailor plan for ID issues  per course,results.Will defer to primary team on management of other issues.  Case d/w Med NP  Infectious Diseases Service will cover over weekend.  Please call [phone] if issues

## 2019-06-22 NOTE — PHYSICAL THERAPY INITIAL EVALUATION ADULT - IMPAIRMENTS FOUND, PT EVAL
ROM/poor safety awareness/aerobic capacity/endurance/arousal, attention, and cognition/gait, locomotion, and balance/cognitive impairment/muscle strength

## 2019-06-22 NOTE — PROGRESS NOTE ADULT - PROBLEM SELECTOR PLAN 2
Pt with h/o dementia   Attempted to call daughter to obtain hx re: baseline mental /cognitive status; left voicemail  -  Pt now with delirium in setting of multiple factors (Hospital admission, pain, ?infection)  - c/w Tylenol 650mg PO Q6H PRN Pain   -c/w Seroquel 12.5mg PO q 12 prn for agitation  -Frequent re-orientation

## 2019-06-22 NOTE — CONSULT NOTE ADULT - ASSESSMENT
89 year old female with inflammation possible infection at right wrist.  clinical the joint does not appear to be involved but patient is an unreliable communicator due to dementia.  No leukocytosis or fever.  suggest strict elevation  antibiotics per ID  await results of MRI

## 2019-06-22 NOTE — PHYSICAL THERAPY INITIAL EVALUATION ADULT - ADDITIONAL COMMENTS
Pt A&Ox3, but has bouts of confusion and agitation. Pt stated and care manager notes indicate that pt lives in an apartment with an elevator. Pt lives with daughter. Pt ambulates with rolling walker. Pt requires assistance for all ADL's, transfers, and ambulation for household distances. Daughter assists pt and pt has HHA for 4 hours M-F.

## 2019-06-22 NOTE — PHYSICAL THERAPY INITIAL EVALUATION ADULT - RANGE OF MOTION EXAMINATION, REHAB EVAL
deficits as listed below/AROM of Right LE limited to 25% of normal ROM due to pain. Pt unable to tolerate PROM. Right elbow and hand limited to 25% of normal ROM. Pt unable to tolerate PROM due to c/o pain.

## 2019-06-22 NOTE — PHYSICAL THERAPY INITIAL EVALUATION ADULT - PERTINENT HX OF CURRENT PROBLEM, REHAB EVAL
Pt is a 89 F with a PMH of HLD, mild dementia, R cataract that presented with Right wrist pain, swelling, and decreased ability to weightbear on Right Ue. Xray Right Wrist: (-) fx.Head CT: small focus of high attenuation in right foramen of Shahana, old left basasl ganglia/corona radiata infarct.

## 2019-06-22 NOTE — CONSULT NOTE ADULT - SUBJECTIVE AND OBJECTIVE BOX
CC:  Patient is a 89y old  Female who presents with a chief complaint of r/o R wrist infection (2019 20:18)      HPI:  89 y/o woman w/ PMH HLD, mild dementia, R cataract c/b corneal injury, ?prior subclinical L basal ganglia CVA p/w R wrist pain and swelling x 2 days. Patient reports she began feeling a subtle  dull pain in her R wrist while at home trying to use her walker. She was unable to put weight on her R hand. She noticed "redness" on tracking from just distal to her elbow to her R hand, symptoms that progressed over the past 24 hours. Pain is rated 6/10 at its worst. It is exacerbated with any movement of the R wrist (flexion/extension/abduction/adduction) and has some relief with tylenol. She has had some difficulty w/ finger extension, as well. Reports some mild chills, but no fevers. Denies any preceding skin tear/breakdown in the R wrist area. No drainage or pus. Denies recent falls, no wrist trauma. She has no h/o gout or pseudogout. ROS is otherwise unremarkable. Patient has adequate functional status at baseline, ambulates w/ walker and lives with her daughter in an apartment. No dysphagia. Daughter reports mental status is at her baseline, w/ some very mild confusion at times but generally independent in her ADLs and many IADLs. (2019 17:18)      PAST MEDICAL & SURGICAL HISTORY:  Wrist fracture  Hip fracture  HTN (Hypertension)  Cerebrovascular Accident (Stroke)  Cataract  Status Post Small Bowel Resection  C Section  S/P Exploratory Laparotomy      Allergies    No Known Allergies    Intolerances    penicillin (Stomach Upset)      SOCIAL HISTORY          Smoking: Yes [ ]  No [ ]   ______pk yrs          ETOH  Yes [ ]  No [ ]  Social [ ]          DRUGS:  Yes [ ]  No [ ]  if so what______________    FAMILY HISTORY:  Paternal family history of hypertension      MEDICATIONS  (STANDING):  ceFAZolin   IVPB 1000 milliGRAM(s) IV Intermittent every 8 hours  heparin  Injectable 5000 Unit(s) SubCutaneous every 12 hours  prednisoLONE acetate 1% Suspension 1 Drop(s) Both EYES every 12 hours  simvastatin 10 milliGRAM(s) Oral at bedtime    MEDICATIONS  (PRN):  acetaminophen   Tablet .. 650 milliGRAM(s) Oral every 6 hours PRN Mild Pain (1 - 3), Moderate Pain (4 - 6)  QUEtiapine 12.5 milliGRAM(s) Oral every 12 hours PRN agitation         Review of systems:  General:  no fever or chills  Pulmonary:  no SOB  Cardiac:  denies chest pain, palpitations  GI:  no nausea, vomitting, or abddominal pain  :  no increase frequency, or burning  Heme:  no easy bruising with minor trauma  Musculoskeletal:  No history of back pain or trauma  Skin:  no history of rashes, injury, trauma  Neuro:  no weakness         Vital Signs Last 24 Hrs  T(C): 36.6 (2019 05:39), Max: 36.7 (2019 08:23)  T(F): 97.8 (2019 05:39), Max: 98.1 (2019 08:23)  HR: 76 (2019 05:39) (73 - 89)  BP: 147/73 (2019 05:39) (147/73 - 162/72)  BP(mean): --  RR: 17 (2019 05:39) (17 - 18)  SpO2: 98% (2019 05:39) (94% - 98%)    Physical Exam:    General:   no distress  Extremities: flexion of 2nd to 5th digits at PIPj.  passive extension painful (at finger level per patient), redness volar and dorsal wrist and extending proximally, able to range wrist without tenderness. nonfluctuant soft tissue masses vs edema present           LABS:                        13.3   8.88  )-----------( 171      ( 2019 09:23 )             40.2     06-22    131<L>  |  92<L>  |  12  ----------------------------<  115<H>  3.8   |  28  |  0.42<L>    Ca    9.2      2019 06:47  Phos  2.6     06-21  Mg     1.8     06-21    TPro  7.6  /  Alb  4.0  /  TBili  0.7  /  DBili  x   /  AST  11  /  ALT  9<L>  /  AlkPhos  124<H>  06-20    PT/INR - ( 2019 13:02 )   PT: 14.8 sec;   INR: 1.28 ratio         PTT - ( 2019 13:02 )  PTT:26.9 sec  Urinalysis Basic - ( 2019 15:44 )    Color: Light Yellow / Appearance: Clear / S.013 / pH: x  Gluc: x / Ketone: Negative  / Bili: Negative / Urobili: Negative   Blood: x / Protein: Negative / Nitrite: Negative   Leuk Esterase: Negative / RBC: 9 /hpf / WBC 0 /HPF   Sq Epi: x / Non Sq Epi: 0 /hpf / Bacteria: Negative        RADIOLOGY & ADDITIONAL STUDIES:  MRI reading pending  Risks, benefits, and alternatives to treatment discussed. All questions answered with understanding.    Procedure Performed:  (  )Yes     ( x )No  Name of Procedure:      [  ]Debridement     [  ]I&D    [  ]Laceration Repair     [  ]Other:  (  )partial thickness     (  )full thickness     (  )subcutaneous     (  )muscle/tendon     (  )bone  (  )sharp     (  )surgical

## 2019-06-22 NOTE — PROGRESS NOTE ADULT - PROBLEM SELECTOR PLAN 1
- per ID, Rheum septic arthritis more likely  - ROM is improving on abx  - MRI wrist shows small effusion and significant tenosynovitis  - will require I&D by Plastic sx  - c/w Ceftriaxone , Vancomycin

## 2019-06-22 NOTE — PROGRESS NOTE ADULT - ASSESSMENT
89 yo F hx HLD, dementia p/w R wrist pain and swelling.  Significant swelling of R wrist with effusion, warmth, restricted ROM, but improved from prior exam with antibiotics. R elbow also with erythema and effusion.  MRI wrist with tenosynovitis and small effusions.  Concerning for septic arthritis, would continue antibiotics for now, no steroids.    - given the location of the fluid being on the palmar aspect of the R wrist, would defer arthrocentesis to plastic surgery, would also recommend for arthrocentesis of R elbow as well as the R wrist  - please send fluid for cell count, gram stain/culture, crystals  - would recommend ice for affected joints  - xray right elbow  - continue antibiotics, no steroids  - after arthrocentesis by plastics, can start colchicine 0.6mg BID    Asim Castaneda MD  Rheumatology Fellow PGY IV 89 yo F hx HLD, dementia p/w R wrist pain and swelling.  Now with oligoarticular inflammatory arthritis.     #oligoarticular inflammatory arthritis (wrist and elbow).   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    -Chondrocalcinosis on xray, more than 1 joint involvement, toe pain, afebrile and normal wbc more suggestive of crystalline process.   -Agree with aspiration (plastics team called already), may need guidance for wrist given findings.   - REC: xray right elbow  - REC: check SF for crystals in addition to the gm stain and culture  - REC: continue abx  - REC: add colchicine 0.6 mg BID  - REC Ice to affected joints  - REC:  t/c CS - will d/w id      Asim Castaneda MD  Rheumatology Fellow PGY IV

## 2019-06-22 NOTE — PROGRESS NOTE ADULT - SUBJECTIVE AND OBJECTIVE BOX
Patient is a 89y old  Female who presents with a chief complaint of r/o R wrist infection (22 Jun 2019 07:22)    Being followed by ID for R wrist/arm /wrist cellulitis/arthritis     Interval history:confused denies wrist pain but tender   No acute events      ROS:  No cough,SOB,CP  No N/V/D./abd pain  No other complaints      Antimicrobials:    cefTRIAXone   IVPB 1000 milliGRAM(s) IV Intermittent every 24 hours  vancomycin  IVPB 1000 milliGRAM(s) IV Intermittent daily    Other medications reviewed    Vital Signs Last 24 Hrs  T(C): 36.6 (06-22-19 @ 05:39), Max: 36.6 (06-22-19 @ 05:39)  T(F): 97.8 (06-22-19 @ 05:39), Max: 97.8 (06-22-19 @ 05:39)  HR: 76 (06-22-19 @ 05:39) (76 - 89)  BP: 147/73 (06-22-19 @ 05:39) (147/73 - 162/72)  BP(mean): --  RR: 17 (06-22-19 @ 05:39) (17 - 18)  SpO2: 98% (06-22-19 @ 05:39) (94% - 98%)    Physical Exam:        HEENT PERRLA EOMI    No oral exudate or erythema    Chest Good AE,CTA    CVS RRR S1 S2 WNl ESM  or rub or gallop    Abd soft BS normal No tenderness no masses    IV site no erythema tenderness or discharge  R wrist /forearm/elbow some erythema   wrist painful ROM  appearance overall slightly better than yesterday     CNS AAO X 3 no focal    Lab Data:                          13.3   8.88  )-----------( 171      ( 21 Jun 2019 09:23 )             40.2       06-22    131<L>  |  92<L>  |  12  ----------------------------<  115<H>  3.8   |  28  |  0.42<L>    Ca    9.2      22 Jun 2019 06:47  Phos  2.6     06-21  Mg     1.8     06-21    TPro  7.6  /  Alb  4.0  /  TBili  0.7  /  DBili  x   /  AST  11  /  ALT  9<L>  /  AlkPhos  124<H>  06-20        Culture - Urine (collected 20 Jun 2019 20:33)  Source: .Urine  Final Report (21 Jun 2019 16:29):    No growth    Culture - Blood (collected 20 Jun 2019 17:35)  Source: .Blood  Preliminary Report (21 Jun 2019 18:01):    No growth to date.    Culture - Blood (collected 20 Jun 2019 17:35)  Source: .Blood  Preliminary Report (21 Jun 2019 18:01):    No growth to date.        < from: MR Wrist No Cont, Right (06.21.19 @ 21:51) >    Impression:   Markedly limited incomplete evaluation.  Partially included marked flexor digitorum tenosynovitis, moderate flexor   carpi radialis tenosynovitis and mild abductor pollicis longus   tenosynovitis. Small distal radioulnar joint effusion and radiocarpal   joint effusion with synovitis. Diffuse soft tissue edema and subcutaneous   edema. These findings are likely infectious given the clinical history.   Nonspecific marrow signal abnormality which is incompletely evaluated on   this study; osteomyelitis remains in the differential.              < end of copied text >    Procalcitonin, Serum: 0.15: This assay is intended for use to determine the change of PCT over time  as an aid in assessing the cumulative 28-day risk of all-cause mortality  for patients diagnosed with severe sepsis or septic shock in the ICU, or  when obtained in the emergency department or other medical wards prior to  ICU admission. This assay was performed by the Roche Stadiuss varinodeS PCT  assay. ng/mL (06.22.19 @ 06:47)            Sedimentation Rate, Erythrocyte (06.20.19 @ 13:02)    Sedimentation Rate, Erythrocyte: 28 mm/hr

## 2019-06-23 LAB
ANION GAP SERPL CALC-SCNC: 16 MMOL/L — SIGNIFICANT CHANGE UP (ref 5–17)
BUN SERPL-MCNC: 17 MG/DL — SIGNIFICANT CHANGE UP (ref 7–23)
CALCIUM SERPL-MCNC: 9.1 MG/DL — SIGNIFICANT CHANGE UP (ref 8.4–10.5)
CHLORIDE SERPL-SCNC: 90 MMOL/L — LOW (ref 96–108)
CO2 SERPL-SCNC: 27 MMOL/L — SIGNIFICANT CHANGE UP (ref 22–31)
CREAT SERPL-MCNC: 0.52 MG/DL — SIGNIFICANT CHANGE UP (ref 0.5–1.3)
GLUCOSE SERPL-MCNC: 98 MG/DL — SIGNIFICANT CHANGE UP (ref 70–99)
POTASSIUM SERPL-MCNC: 3.3 MMOL/L — LOW (ref 3.5–5.3)
POTASSIUM SERPL-SCNC: 3.3 MMOL/L — LOW (ref 3.5–5.3)
SODIUM SERPL-SCNC: 133 MMOL/L — LOW (ref 135–145)

## 2019-06-23 PROCEDURE — 99233 SBSQ HOSP IP/OBS HIGH 50: CPT

## 2019-06-23 PROCEDURE — 99232 SBSQ HOSP IP/OBS MODERATE 35: CPT

## 2019-06-23 RX ORDER — POTASSIUM CHLORIDE 20 MEQ
40 PACKET (EA) ORAL ONCE
Refills: 0 | Status: COMPLETED | OUTPATIENT
Start: 2019-06-23 | End: 2019-06-23

## 2019-06-23 RX ADMIN — CEFTRIAXONE 100 MILLIGRAM(S): 500 INJECTION, POWDER, FOR SOLUTION INTRAMUSCULAR; INTRAVENOUS at 16:59

## 2019-06-23 RX ADMIN — SIMVASTATIN 10 MILLIGRAM(S): 20 TABLET, FILM COATED ORAL at 21:40

## 2019-06-23 RX ADMIN — Medication 1 DROP(S): at 18:17

## 2019-06-23 RX ADMIN — Medication 40 MILLIEQUIVALENT(S): at 18:17

## 2019-06-23 RX ADMIN — HEPARIN SODIUM 5000 UNIT(S): 5000 INJECTION INTRAVENOUS; SUBCUTANEOUS at 18:17

## 2019-06-23 RX ADMIN — Medication 1 DROP(S): at 06:04

## 2019-06-23 RX ADMIN — HEPARIN SODIUM 5000 UNIT(S): 5000 INJECTION INTRAVENOUS; SUBCUTANEOUS at 06:04

## 2019-06-23 RX ADMIN — Medication 250 MILLIGRAM(S): at 13:28

## 2019-06-23 NOTE — PROGRESS NOTE ADULT - SUBJECTIVE AND OBJECTIVE BOX
PEDRO BYRD  [id number]    INTERVAL HPI/OVERNIGHT EVENTS:    No overnight events, continues on antibiotics with improvement in symptoms. Patient requesting to get out of bed.    MEDICATIONS  (STANDING):  cefTRIAXone   IVPB 1000 milliGRAM(s) IV Intermittent every 24 hours  heparin  Injectable 5000 Unit(s) SubCutaneous every 12 hours  prednisoLONE acetate 1% Suspension 1 Drop(s) Right EYE every 12 hours  simvastatin 10 milliGRAM(s) Oral at bedtime  vancomycin  IVPB 1000 milliGRAM(s) IV Intermittent daily    MEDICATIONS  (PRN):  acetaminophen   Tablet .. 650 milliGRAM(s) Oral every 6 hours PRN Mild Pain (1 - 3), Moderate Pain (4 - 6)  QUEtiapine 12.5 milliGRAM(s) Oral every 12 hours PRN agitation      Allergies    No Known Allergies    Intolerances    penicillin (Stomach Upset)      Review of Systems:   +joint pains      Vital Signs Last 24 Hrs  T(C): 36.4 (23 Jun 2019 11:44), Max: 36.9 (22 Jun 2019 19:34)  T(F): 97.6 (23 Jun 2019 11:44), Max: 98.5 (22 Jun 2019 19:34)  HR: 79 (23 Jun 2019 11:44) (7 - 82)  BP: 110/67 (23 Jun 2019 11:44) (110/67 - 155/75)  BP(mean): --  RR: 18 (23 Jun 2019 11:44) (18 - 18)  SpO2: 91% (23 Jun 2019 11:44) (91% - 95%)    Physical Exam:  General: NAD - Awake, intermittently agitated  MSK: R wrist with improvement in swelling, edema and erythema, 10 degrees flexion and extension. Also with R elbow effusion, erythema, can extend 30 degrees  Neuro: alert  Skin: erythema over the R wrist and elbow      LABS:                        14.6   9.33  )-----------( 180      ( 22 Jun 2019 11:25 )             44.0     06-23    133<L>  |  90<L>  |  17  ----------------------------<  98  3.3<L>   |  27  |  0.52    Ca    9.1      23 Jun 2019 07:06              RADIOLOGY & ADDITIONAL TESTS:    EXAM:  WRIST COMPLETE RIGHT-MIN 3 VIEWS                            PROCEDURE DATE:  06/20/2019            INTERPRETATION:  CLINICAL INDICATION: Erythema and swelling along the   dorsal wrist. Status post fall.    EXAM: PA, lateral and oblique viewsof the right wrist    COMPARISON: None      IMPRESSION:   The bones are diffusely demineralized. No discrete fracture is   visualized. There is widening of the scapholunate ligament, consistent   with ligamentous insufficiency-prior injury. There is chondrocalcinosis.   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 There is soft tissue   swelling about the carpus.    EXAM:  MR WRIST RT                            PROCEDURE DATE:  06/21/2019            INTERPRETATION:  Clinical Information: Right wrist swelling, low-grade   fever, concern for joint abscess.    Comparison: None.    Technique: MRI of the wrist was performed utilizing multiplanar imaging   without the administration of intravenous contrast. The study was   terminated prematurely as the patient was unable to tolerate the   examination.     Findings:     Evaluation is markedly limited due to an incomplete study, motion   artifact and inhomogeneous fat saturation. There is a partially included   marked flexor digitorum tenosynovitis most prominent at the level of the   distal radius and ulna. There is moderate flexor carpi radialis   tenosynovitis with a suggestion of septations. There is mild abductor   pollicis longus tenosynovitis. There is a small distal radioulnar joint   effusion. There is likely a radiocarpal joint effusion with synovitis   although the radiocarpal region was not included on the sagittal   sequence. There is diffuse soft tissue edema and subcutaneous edema.   There is a suggestion of hypointense marrow signal within the scaphoid on   the T1-weighted sequence and there are rounded foci of marrow signal   abnormality throughout the visualized osseous structures which are not   well evaluated on this incomplete study.    Impression:   Markedly limited incomplete evaluation.  Partially included marked flexor digitorum tenosynovitis, moderate flexor   carpi radialis tenosynovitis and mild abductor pollicis longus   tenosynovitis. Small distal radioulnar joint effusion and radiocarpal   joint effusion with synovitis. Diffuse soft tissue edema and subcutaneous   edema. These findings are likely infectious given the clinical history.   Nonspecific marrow signal abnormality which is incompletely evaluated on   this study; osteomyelitis remains in the differential. PEDRO BYRD  [id number]    INTERVAL HPI/OVERNIGHT EVENTS:    No overnight events, continues on antibiotics with improvement in symptoms. Patient requesting to get out of bed.    MEDICATIONS  (STANDING):  cefTRIAXone   IVPB 1000 milliGRAM(s) IV Intermittent every 24 hours  heparin  Injectable 5000 Unit(s) SubCutaneous every 12 hours  prednisoLONE acetate 1% Suspension 1 Drop(s) Right EYE every 12 hours  simvastatin 10 milliGRAM(s) Oral at bedtime  vancomycin  IVPB 1000 milliGRAM(s) IV Intermittent daily    MEDICATIONS  (PRN):  acetaminophen   Tablet .. 650 milliGRAM(s) Oral every 6 hours PRN Mild Pain (1 - 3), Moderate Pain (4 - 6)  QUEtiapine 12.5 milliGRAM(s) Oral every 12 hours PRN agitation      Allergies    No Known Allergies    Intolerances    penicillin (Stomach Upset)      Review of Systems:   +joint pains      Vital Signs Last 24 Hrs  T(C): 36.4 (23 Jun 2019 11:44), Max: 36.9 (22 Jun 2019 19:34)  T(F): 97.6 (23 Jun 2019 11:44), Max: 98.5 (22 Jun 2019 19:34)  HR: 79 (23 Jun 2019 11:44) (7 - 82)  BP: 110/67 (23 Jun 2019 11:44) (110/67 - 155/75)  BP(mean): --  RR: 18 (23 Jun 2019 11:44) (18 - 18)  SpO2: 91% (23 Jun 2019 11:44) (91% - 95%)    Physical Exam:  General: NAD - Awake, intermittently agitated  MSK: R wrist with continued improvement in swelling, edema and erythema, 10 degrees flexion and extension.  Able to touch the joint now without discomfort.   Also with R elbow effusion, erythema, can extend 30 degrees  Neuro: alert  Skin: erythema over the R wrist and elbow      LABS:                        14.6   9.33  )-----------( 180      ( 22 Jun 2019 11:25 )             44.0     06-23    133<L>  |  90<L>  |  17  ----------------------------<  98  3.3<L>   |  27  |  0.52    Ca    9.1      23 Jun 2019 07:06              RADIOLOGY & ADDITIONAL TESTS:    EXAM:  WRIST COMPLETE RIGHT-MIN 3 VIEWS                            PROCEDURE DATE:  06/20/2019            INTERPRETATION:  CLINICAL INDICATION: Erythema and swelling along the   dorsal wrist. Status post fall.    EXAM: PA, lateral and oblique viewsof the right wrist    COMPARISON: None      IMPRESSION:   The bones are diffusely demineralized. No discrete fracture is   visualized. There is widening of the scapholunate ligament, consistent   with ligamentous insufficiency-prior injury. There is chondrocalcinosis.   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 There is soft tissue   swelling about the carpus.    EXAM:  MR WRIST RT                            PROCEDURE DATE:  06/21/2019            INTERPRETATION:  Clinical Information: Right wrist swelling, low-grade   fever, concern for joint abscess.    Comparison: None.    Technique: MRI of the wrist was performed utilizing multiplanar imaging   without the administration of intravenous contrast. The study was   terminated prematurely as the patient was unable to tolerate the   examination.     Findings:     Evaluation is markedly limited due to an incomplete study, motion   artifact and inhomogeneous fat saturation. There is a partially included   marked flexor digitorum tenosynovitis most prominent at the level of the   distal radius and ulna. There is moderate flexor carpi radialis   tenosynovitis with a suggestion of septations. There is mild abductor   pollicis longus tenosynovitis. There is a small distal radioulnar joint   effusion. There is likely a radiocarpal joint effusion with synovitis   although the radiocarpal region was not included on the sagittal   sequence. There is diffuse soft tissue edema and subcutaneous edema.   There is a suggestion of hypointense marrow signal within the scaphoid on   the T1-weighted sequence and there are rounded foci of marrow signal   abnormality throughout the visualized osseous structures which are not   well evaluated on this incomplete study.    Impression:   Markedly limited incomplete evaluation.  Partially included marked flexor digitorum tenosynovitis, moderate flexor   carpi radialis tenosynovitis and mild abductor pollicis longus   tenosynovitis. Small distal radioulnar joint effusion and radiocarpal   joint effusion with synovitis. Diffuse soft tissue edema and subcutaneous   edema. These findings are likely infectious given the clinical history.   Nonspecific marrow signal abnormality which is incompletely evaluated on   this study; osteomyelitis remains in the differential.

## 2019-06-23 NOTE — PROGRESS NOTE ADULT - PROBLEM SELECTOR PLAN 2
Pt with h/o dementia   Attempted to call daughter to obtain hx re: baseline mental /cognitive status; left voicemail  -  Pt now with delirium in setting of multiple factors (Hospital admission, pain, ?infection)  - c/w Tylenol 650mg PO Q6H PRN Pain   -c/w Seroquel 12.5mg PO q 12 prn for agitation  -Frequent re-orientation - Pt with h/o dementia   -  Pt now with delirium in setting of multiple factors (Hospital admission, pain, ?infection)  - c/w Tylenol 650mg PO Q6H PRN Pain   -c/w Seroquel 12.5mg PO q 12 prn for agitation  -Frequent re-orientation

## 2019-06-23 NOTE — PROGRESS NOTE ADULT - SUBJECTIVE AND OBJECTIVE BOX
89y Female admitted with Right wrist pain  spoke to daughter Cathryn.    Patient seen and examined bedside resting comfortably.  No complaints offered.  wants to get out of bed to chair    T(F): 97.6 (06-23-19 @ 11:44), Max: 98.5 (06-22-19 @ 19:34)  HR: 79 (06-23-19 @ 11:44) (7 - 82)  BP: 110/67 (06-23-19 @ 11:44) (110/67 - 155/75)  RR: 18 (06-23-19 @ 11:44) (18 - 18)  SpO2: 91% (06-23-19 @ 11:44) (91% - 95%)  Wt(kg): --  CAPILLARY BLOOD GLUCOSE          PHYSICAL EXAM:  General: NAD   Neuro:  Alert       Extremities: decrease redness and edema right wrist.  no pain with passive extension of fingers 2 to 5.  process becoming more localized.  passive wrist flexion less painful than passive extension       LABS:                        14.6   9.33  )-----------( 180      ( 22 Jun 2019 11:25 )             44.0     06-23    133<L>  |  90<L>  |  17  ----------------------------<  98  3.3<L>   |  27  |  0.52    Ca    9.1      23 Jun 2019 07:06        I&O's Detail    22 Jun 2019 07:01  -  23 Jun 2019 07:00  --------------------------------------------------------  IN:    IV PiggyBack: 50 mL    Oral Fluid: 950 mL  Total IN: 1000 mL    OUT:    Voided: 250 mL  Total OUT: 250 mL    Total NET: 750 mL          Impression: 89y Female admitted with Right wrist pain  Process appears to be improving on antibiotics, now on rocephin and vancomycin      Plan:  -continue present management  -consider IR of aspiration/drainage  -daughter aware surgery may be needed if improvement stalls

## 2019-06-23 NOTE — PROGRESS NOTE ADULT - ASSESSMENT
89yo woman w/ PMH HLD, mild dementia, R cataract c/b corneal injury, ?prior subclinical L basal ganglia CVA p/w R wrist pain and swelling x 2 days, concerning for possible wrist infection vs gout flare

## 2019-06-23 NOTE — PROGRESS NOTE ADULT - PROBLEM SELECTOR PLAN 1
- per ID, Rheum septic arthritis more likely  - ROM is improving on abx  - MRI wrist shows small effusion and significant tenosynovitis  - will require I&D by Plastic sx  - c/w Ceftriaxone , Vancomycin - per ID, Rheum septic arthritis more likely  - ROM is improving on abx  - MRI wrist shows small effusion and significant tenosynovitis  - Plastics recommending IR aspiration, can place consult for Monday   - c/w Ceftriaxone , Vancomycin

## 2019-06-23 NOTE — PROGRESS NOTE ADULT - ASSESSMENT
89 yo F hx HLD, dementia p/w R wrist pain and swelling.  Now with oligoarticular inflammatory arthritis.     #oligoarticular inflammatory arthritis (wrist and elbow).   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    -Chondrocalcinosis on xray, more than 1 joint involvement, afebrile and normal wbc more suggestive of crystalline process.   -Possible patient has septic joint of wrist with some underlying CPPD in her R wrist and R elbow  -Agree with aspiration (plastics team called already), may need guidance for wrist given findings.   - REC: xray right elbow f/u  - REC: check SF for crystals in addition to the gm stain and culture  - REC: continue abx  - REC: add colchicine 0.6 mg BID after aspiration  - REC Ice to affected joints  - REC:  t/c CS - will d/w id      Asim Castaneda MD  Rheumatology Fellow PGY IV 89 yo F hx HLD, dementia p/w R wrist pain and swelling.  Now with oligoarticular inflammatory arthritis.     #oligoarticular inflammatory arthritis (wrist and elbow).   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    -Chondrocalcinosis on xray, more than 1 joint involvement, afebrile and normal wbc more suggestive of crystalline process.   -Possible patient has septic joint of wrist with some underlying CPPD in her R wrist and R elbow  -Agree with aspiration (plastics team called already), may need guidance for wrist given findings.   - REC: xray right elbow f/u  - REC: check SF for crystals in addition to the gm stain and culture  - REC: continue abx  - REC: add colchicine 0.6 mg BID after aspiration  - REC Ice to affected joints  - REC:  t/c CS - will d/w id      Asim Castaneda MD  Rheumatology Fellow PGY IV

## 2019-06-23 NOTE — PROGRESS NOTE ADULT - SUBJECTIVE AND OBJECTIVE BOX
Patient is a 89y old  Female who presents with a chief complaint of r/o R wrist infection (23 Jun 2019 13:50)    SUBJECTIVE / OVERNIGHT EVENTS: no acute events overnight, pt states that her R wrist is still painful     MEDICATIONS  (STANDING):  cefTRIAXone   IVPB 1000 milliGRAM(s) IV Intermittent every 24 hours  heparin  Injectable 5000 Unit(s) SubCutaneous every 12 hours  prednisoLONE acetate 1% Suspension 1 Drop(s) Right EYE every 12 hours  simvastatin 10 milliGRAM(s) Oral at bedtime  vancomycin  IVPB 1000 milliGRAM(s) IV Intermittent daily    MEDICATIONS  (PRN):  acetaminophen   Tablet .. 650 milliGRAM(s) Oral every 6 hours PRN Mild Pain (1 - 3), Moderate Pain (4 - 6)  QUEtiapine 12.5 milliGRAM(s) Oral every 12 hours PRN agitation      Vital Signs Last 24 Hrs  T(C): 36.4 (23 Jun 2019 11:44), Max: 36.9 (22 Jun 2019 19:34)  T(F): 97.6 (23 Jun 2019 11:44), Max: 98.5 (22 Jun 2019 19:34)  HR: 79 (23 Jun 2019 11:44) (7 - 82)  BP: 110/67 (23 Jun 2019 11:44) (110/67 - 155/75)  BP(mean): --  RR: 18 (23 Jun 2019 11:44) (18 - 18)  SpO2: 91% (23 Jun 2019 11:44) (91% - 95%)  CAPILLARY BLOOD GLUCOSE        I&O's Summary    22 Jun 2019 07:01  -  23 Jun 2019 07:00  --------------------------------------------------------  IN: 1000 mL / OUT: 250 mL / NET: 750 mL    23 Jun 2019 07:01  -  23 Jun 2019 14:02  --------------------------------------------------------  IN: 250 mL / OUT: 0 mL / NET: 250 mL        PHYSICAL EXAM:  GENERAL: cachetic, chronically ill appearing female in NAD   NECK: Supple, No JVD  CHEST/LUNG: Clear to auscultation bilaterally; No wheeze  HEART: +S1/S2, reg   ABDOMEN: Soft, Nontender, Nondistended; Bowel sounds present  EXTREMITIES:  R wrist with overlying erythema and tenderness   PSYCH: Alert and awake, answering questions appropriately and following commands     LABS:                        14.6   9.33  )-----------( 180      ( 22 Jun 2019 11:25 )             44.0     06-23    133<L>  |  90<L>  |  17  ----------------------------<  98  3.3<L>   |  27  |  0.52    Ca    9.1      23 Jun 2019 07:06      Consultant(s) Notes Reviewed:  Rheum, plastic surgery

## 2019-06-24 LAB
ANION GAP SERPL CALC-SCNC: 11 MMOL/L — SIGNIFICANT CHANGE UP (ref 5–17)
BUN SERPL-MCNC: 19 MG/DL — SIGNIFICANT CHANGE UP (ref 7–23)
CALCIUM SERPL-MCNC: 8.9 MG/DL — SIGNIFICANT CHANGE UP (ref 8.4–10.5)
CHLORIDE SERPL-SCNC: 91 MMOL/L — LOW (ref 96–108)
CO2 SERPL-SCNC: 26 MMOL/L — SIGNIFICANT CHANGE UP (ref 22–31)
CREAT SERPL-MCNC: 0.39 MG/DL — LOW (ref 0.5–1.3)
GLUCOSE SERPL-MCNC: 117 MG/DL — HIGH (ref 70–99)
POTASSIUM SERPL-MCNC: 4 MMOL/L — SIGNIFICANT CHANGE UP (ref 3.5–5.3)
POTASSIUM SERPL-SCNC: 4 MMOL/L — SIGNIFICANT CHANGE UP (ref 3.5–5.3)
SODIUM SERPL-SCNC: 128 MMOL/L — LOW (ref 135–145)
VANCOMYCIN TROUGH SERPL-MCNC: <4 UG/ML — LOW (ref 10–20)

## 2019-06-24 PROCEDURE — 99233 SBSQ HOSP IP/OBS HIGH 50: CPT

## 2019-06-24 PROCEDURE — 99232 SBSQ HOSP IP/OBS MODERATE 35: CPT

## 2019-06-24 RX ORDER — TRAMADOL HYDROCHLORIDE 50 MG/1
25 TABLET ORAL EVERY 6 HOURS
Refills: 0 | Status: DISCONTINUED | OUTPATIENT
Start: 2019-06-24 | End: 2019-06-25

## 2019-06-24 RX ORDER — ACETAMINOPHEN 500 MG
650 TABLET ORAL EVERY 6 HOURS
Refills: 0 | Status: DISCONTINUED | OUTPATIENT
Start: 2019-06-24 | End: 2019-06-28

## 2019-06-24 RX ORDER — VANCOMYCIN HCL 1 G
750 VIAL (EA) INTRAVENOUS EVERY 12 HOURS
Refills: 0 | Status: DISCONTINUED | OUTPATIENT
Start: 2019-06-24 | End: 2019-06-28

## 2019-06-24 RX ADMIN — Medication 1 DROP(S): at 06:07

## 2019-06-24 RX ADMIN — Medication 1 DROP(S): at 17:23

## 2019-06-24 RX ADMIN — HEPARIN SODIUM 5000 UNIT(S): 5000 INJECTION INTRAVENOUS; SUBCUTANEOUS at 06:07

## 2019-06-24 RX ADMIN — Medication 650 MILLIGRAM(S): at 17:22

## 2019-06-24 RX ADMIN — CEFTRIAXONE 100 MILLIGRAM(S): 500 INJECTION, POWDER, FOR SOLUTION INTRAMUSCULAR; INTRAVENOUS at 17:23

## 2019-06-24 RX ADMIN — Medication 250 MILLIGRAM(S): at 12:57

## 2019-06-24 RX ADMIN — HEPARIN SODIUM 5000 UNIT(S): 5000 INJECTION INTRAVENOUS; SUBCUTANEOUS at 17:23

## 2019-06-24 RX ADMIN — SIMVASTATIN 10 MILLIGRAM(S): 20 TABLET, FILM COATED ORAL at 22:16

## 2019-06-24 RX ADMIN — Medication 650 MILLIGRAM(S): at 18:00

## 2019-06-24 RX ADMIN — Medication 250 MILLIGRAM(S): at 23:52

## 2019-06-24 RX ADMIN — QUETIAPINE FUMARATE 12.5 MILLIGRAM(S): 200 TABLET, FILM COATED ORAL at 17:30

## 2019-06-24 NOTE — PROGRESS NOTE ADULT - SUBJECTIVE AND OBJECTIVE BOX
Patient is a 89y old  Female who presents with a chief complaint of r/o R wrist infection (24 Jun 2019 12:20)    Being followed by ID for R arm/wrist cellulitis/tenosynovitis     Interval history:pain better  patient denies any complaints but confused   No other acute events      ROS:  No cough,SOB,CP  No N/V/D  No abd pain  No urinary complaints  No HA  No joint or limb pain  No other complaints      Antimicrobials:    cefTRIAXone   IVPB 1000 milliGRAM(s) IV Intermittent every 24 hours  vancomycin  IVPB 1000 milliGRAM(s) IV Intermittent daily      other medications reviewed    Vital Signs Last 24 Hrs  T(C): 36.4 (06-24-19 @ 11:12), Max: 37 (06-23-19 @ 21:38)  T(F): 97.6 (06-24-19 @ 11:12), Max: 98.6 (06-23-19 @ 21:38)  HR: 70 (06-24-19 @ 11:12) (70 - 78)  BP: 139/79 (06-24-19 @ 11:12) (139/79 - 146/73)  BP(mean): --  RR: 18 (06-24-19 @ 11:12) (18 - 18)  SpO2: 92% (06-24-19 @ 11:12) (92% - 95%)    Physical Exam:      HEENT PERRLA EOMI    No oral exudate or erythema    Chest Good AE,CTA    CVS RRR S1 S2 WNl ESM  or rub or gallop    Abd soft BS normal No tenderness no masses    IV site no erythema tenderness or discharge  R wrist /forearm/elbow some erythema   wrist painful ROM  appearance overall slightly better than yesterday     CNS AAO X 3 no focal  Lab Data:        06-24    128<L>  |  91<L>  |  19  ----------------------------<  117<H>  4.0   |  26  |  0.39<L>    Ca    8.9      24 Jun 2019 06:49          Culture - Urine (collected 20 Jun 2019 20:33)  Source: .Urine  Final Report (21 Jun 2019 16:29):    No growth    Culture - Blood (collected 20 Jun 2019 17:35)  Source: .Blood  Preliminary Report (21 Jun 2019 18:01):    No growth to date.    Culture - Blood (collected 20 Jun 2019 17:35)  Source: .Blood  Preliminary Report (21 Jun 2019 18:01):    No growth to date.        vanco <4            < from: MR Wrist No Cont, Right (06.21.19 @ 21:51) >  Impression:   Markedly limited incomplete evaluation.  Partially included marked flexor digitorum tenosynovitis, moderate flexor   carpi radialis tenosynovitis and mild abductor pollicis longus   tenosynovitis. Small distal radioulnar joint effusion and radiocarpal   joint effusion with synovitis. Diffuse soft tissue edema and subcutaneous   edema. These findings are likely infectious given the clinical history.   Nonspecific marrow signal abnormality which is incompletely evaluated on   this study; osteomyelitis remains in the differential.      < end of copied text >

## 2019-06-24 NOTE — PROGRESS NOTE ADULT - SUBJECTIVE AND OBJECTIVE BOX
PEDRO BYRD  [id number]    INTERVAL HPI/OVERNIGHT EVENTS:    Continues to have improvement in physical exam of R wrist and R elbow.    MEDICATIONS  (STANDING):  cefTRIAXone   IVPB 1000 milliGRAM(s) IV Intermittent every 24 hours  heparin  Injectable 5000 Unit(s) SubCutaneous every 12 hours  prednisoLONE acetate 1% Suspension 1 Drop(s) Right EYE every 12 hours  simvastatin 10 milliGRAM(s) Oral at bedtime  vancomycin  IVPB 1000 milliGRAM(s) IV Intermittent daily    MEDICATIONS  (PRN):  acetaminophen   Tablet .. 650 milliGRAM(s) Oral every 6 hours PRN Mild Pain (1 - 3)  QUEtiapine 12.5 milliGRAM(s) Oral every 12 hours PRN agitation  traMADol 25 milliGRAM(s) Oral every 6 hours PRN moderate to severe pain      Allergies    No Known Allergies    Intolerances    penicillin (Stomach Upset)      Review of Systems:   +joint pains      Vital Signs Last 24 Hrs  T(C): 36.4 (24 Jun 2019 11:12), Max: 37 (23 Jun 2019 21:38)  T(F): 97.6 (24 Jun 2019 11:12), Max: 98.6 (23 Jun 2019 21:38)  HR: 70 (24 Jun 2019 11:12) (70 - 78)  BP: 139/79 (24 Jun 2019 11:12) (139/79 - 146/73)  BP(mean): --  RR: 18 (24 Jun 2019 11:12) (18 - 18)  SpO2: 92% (24 Jun 2019 11:12) (92% - 95%)    Physical Exam:  General: NAD - Awake, intermittently agitated  MSK: R wrist with continued improvement in swelling, edema and erythema, 10 degrees flexion and extension.  Able to touch the joint now without discomfort.   Also with improved R elbow effusion, erythema, can extend 30 degrees  Neuro: alert  Skin: erythema over the R wrist and elbow      LABS:    06-24    128<L>  |  91<L>  |  19  ----------------------------<  117<H>  4.0   |  26  |  0.39<L>    Ca    8.9      24 Jun 2019 06:49              RADIOLOGY & ADDITIONAL TESTS:    EXAM:  WRIST COMPLETE RIGHT-MIN 3 VIEWS                            PROCEDURE DATE:  06/20/2019            INTERPRETATION:  CLINICAL INDICATION: Erythema and swelling along the   dorsal wrist. Status post fall.    EXAM: PA, lateral and oblique viewsof the right wrist    COMPARISON: None      IMPRESSION:   The bones are diffusely demineralized. No discrete fracture is   visualized. There is widening of the scapholunate ligament, consistent   with ligamentous insufficiency-prior injury. There is chondrocalcinosis.   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 There is soft tissue   swelling about the carpus.    EXAM:  MR WRIST RT                            PROCEDURE DATE:  06/21/2019            INTERPRETATION:  Clinical Information: Right wrist swelling, low-grade   fever, concern for joint abscess.    Comparison: None.    Technique: MRI of the wrist was performed utilizing multiplanar imaging   without the administration of intravenous contrast. The study was   terminated prematurely as the patient was unable to tolerate the   examination.     Findings:     Evaluation is markedly limited due to an incomplete study, motion   artifact and inhomogeneous fat saturation. There is a partially included   marked flexor digitorum tenosynovitis most prominent at the level of the   distal radius and ulna. There is moderate flexor carpi radialis   tenosynovitis with a suggestion of septations. There is mild abductor   pollicis longus tenosynovitis. There is a small distal radioulnar joint   effusion. There is likely a radiocarpal joint effusion with synovitis   although the radiocarpal region was not included on the sagittal   sequence. There is diffuse soft tissue edema and subcutaneous edema.   There is a suggestion of hypointense marrow signal within the scaphoid on   the T1-weighted sequence and there are rounded foci of marrow signal   abnormality throughout the visualized osseous structures which are not   well evaluated on this incomplete study.    Impression:   Markedly limited incomplete evaluation.  Partially included marked flexor digitorum tenosynovitis, moderate flexor   carpi radialis tenosynovitis and mild abductor pollicis longus   tenosynovitis. Small distal radioulnar joint effusion and radiocarpal   joint effusion with synovitis. Diffuse soft tissue edema and subcutaneous   edema. These findings are likely infectious given the clinical history.   Nonspecific marrow signal abnormality which is incompletely evaluated on   this study; osteomyelitis remains in the differential.

## 2019-06-24 NOTE — PROGRESS NOTE ADULT - PROBLEM SELECTOR PLAN 1
- per ID, Rheum septic arthritis more likely  - ROM is improving on abx  - MRI wrist shows small effusion and significant tenosynovitis  - IR eval for aspiration  - c/w Ceftriaxone , Vancomycin

## 2019-06-24 NOTE — PROGRESS NOTE ADULT - PROBLEM SELECTOR PLAN 2
- Pt with h/o dementia   -  Pt now with delirium in setting of multiple factors (Hospital admission, pain, ?infection)  - c/w Tylenol 650mg PO Q6H PRN Pain   -c/w Seroquel 12.5mg PO q 12 prn for agitation  -Frequent re-orientation

## 2019-06-24 NOTE — PROGRESS NOTE ADULT - ASSESSMENT
89 yo F hx HLD, dementia p/w R wrist pain and swelling.  Now with oligoarticular inflammatory arthritis.     #oligoarticular inflammatory arthritis (wrist and elbow).   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    -Chondrocalcinosis on xray, more than 1 joint involvement, afebrile and normal wbc more suggestive of crystalline process.   -Possible patient has septic joint of wrist with some underlying CPPD in her R wrist and R elbow (especially since R elbow symptoms appeared while on antibiotics)  -Agree with aspiration with interventional radiology    - REC: check synovial fluid for crystals in addition to the gram stain and culture after IR aspiration of R wrist  - REC: continue abx  - REC: add colchicine 0.6 mg BID after aspiration  - REC Ice to affected joints      Asim Castaneda MD  Rheumatology Fellow PGY IV

## 2019-06-24 NOTE — PROGRESS NOTE ADULT - SUBJECTIVE AND OBJECTIVE BOX
Patient is a 89y old  Female who presents with a chief complaint of r/o R wrist infection (23 Jun 2019 14:01)      SUBJECTIVE / OVERNIGHT EVENTS:    MEDICATIONS  (STANDING):  cefTRIAXone   IVPB 1000 milliGRAM(s) IV Intermittent every 24 hours  heparin  Injectable 5000 Unit(s) SubCutaneous every 12 hours  prednisoLONE acetate 1% Suspension 1 Drop(s) Right EYE every 12 hours  simvastatin 10 milliGRAM(s) Oral at bedtime  vancomycin  IVPB 1000 milliGRAM(s) IV Intermittent daily    MEDICATIONS  (PRN):  acetaminophen   Tablet .. 650 milliGRAM(s) Oral every 6 hours PRN Mild Pain (1 - 3)  QUEtiapine 12.5 milliGRAM(s) Oral every 12 hours PRN agitation  traMADol 25 milliGRAM(s) Oral every 6 hours PRN moderate to severe pain      Vital Signs Last 24 Hrs  T(C): 36.3 (24 Jun 2019 06:04), Max: 37 (23 Jun 2019 21:38)  T(F): 97.4 (24 Jun 2019 06:04), Max: 98.6 (23 Jun 2019 21:38)  HR: 72 (24 Jun 2019 06:04) (72 - 79)  BP: 141/72 (24 Jun 2019 06:04) (110/67 - 146/73)  BP(mean): --  RR: 18 (24 Jun 2019 06:04) (18 - 18)  SpO2: 93% (24 Jun 2019 06:04) (91% - 95%)  CAPILLARY BLOOD GLUCOSE        I&O's Summary    23 Jun 2019 07:01  -  24 Jun 2019 07:00  --------------------------------------------------------  IN: 1050 mL / OUT: 300 mL / NET: 750 mL        PHYSICAL EXAM:  GENERAL: NAD, well-developed  HEAD:  Atraumatic, Normocephalic  EYES: EOMI, PERRLA, conjunctiva and sclera clear  NECK: Supple, No JVD  CHEST/LUNG: Clear to auscultation bilaterally; No wheeze  HEART: Regular rate and rhythm; No murmurs, rubs, or gallops  ABDOMEN: Soft, Nontender, Nondistended; Bowel sounds present  EXTREMITIES:  2+ Peripheral Pulses, No clubbing, cyanosis, or edema  PSYCH: AAOx3  NEUROLOGY: non-focal  SKIN: No rashes or lesions    LABS:                        14.6   9.33  )-----------( 180      ( 22 Jun 2019 11:25 )             44.0     06-24    128<L>  |  91<L>  |  19  ----------------------------<  117<H>  4.0   |  26  |  0.39<L>    Ca    8.9      24 Jun 2019 06:49                RADIOLOGY & ADDITIONAL TESTS:    Imaging Personally Reviewed:    Consultant(s) Notes Reviewed:      Care Discussed with Consultants/Other Providers: Patient is a 89y old  Female who presents with a chief complaint of r/o R wrist infection (23 Jun 2019 14:01)      SUBJECTIVE / OVERNIGHT EVENTS:  Pt seen and examined. No acute events overnight. c/o right hand pain.    MEDICATIONS  (STANDING):  cefTRIAXone   IVPB 1000 milliGRAM(s) IV Intermittent every 24 hours  heparin  Injectable 5000 Unit(s) SubCutaneous every 12 hours  prednisoLONE acetate 1% Suspension 1 Drop(s) Right EYE every 12 hours  simvastatin 10 milliGRAM(s) Oral at bedtime  vancomycin  IVPB 1000 milliGRAM(s) IV Intermittent daily    MEDICATIONS  (PRN):  acetaminophen   Tablet .. 650 milliGRAM(s) Oral every 6 hours PRN Mild Pain (1 - 3)  QUEtiapine 12.5 milliGRAM(s) Oral every 12 hours PRN agitation  traMADol 25 milliGRAM(s) Oral every 6 hours PRN moderate to severe pain      Vital Signs Last 24 Hrs  T(C): 36.3 (24 Jun 2019 06:04), Max: 37 (23 Jun 2019 21:38)  T(F): 97.4 (24 Jun 2019 06:04), Max: 98.6 (23 Jun 2019 21:38)  HR: 72 (24 Jun 2019 06:04) (72 - 79)  BP: 141/72 (24 Jun 2019 06:04) (110/67 - 146/73)  BP(mean): --  RR: 18 (24 Jun 2019 06:04) (18 - 18)  SpO2: 93% (24 Jun 2019 06:04) (91% - 95%)  CAPILLARY BLOOD GLUCOSE        I&O's Summary    23 Jun 2019 07:01  -  24 Jun 2019 07:00  --------------------------------------------------------  IN: 1050 mL / OUT: 300 mL / NET: 750 mL        PHYSICAL EXAM:  GENERAL: NAD, cachetic, chronically ill appearing  NECK: Supple, No JVD  CHEST: Cear to auscultation bilaterally; No wheeze  HEART: +S1/S2, reg   ABDOMEN: Soft, Nontender, Nondistended; Bowel sounds present  EXTREMITIES:  R wrist with improving swelling and erythema ; + R elbow effusion, erythema  PSYCH: Alert and awake, oriented to self, answering questions appropriately and following commands     LABS:                        14.6   9.33  )-----------( 180      ( 22 Jun 2019 11:25 )             44.0     06-24    128<L>  |  91<L>  |  19  ----------------------------<  117<H>  4.0   |  26  |  0.39<L>    Ca    8.9      24 Jun 2019 06:49                RADIOLOGY & ADDITIONAL TESTS:    Imaging Personally Reviewed:  ELBOW RIGHT XR  Large elbow joint effusion. 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   Superimposed infection is difficult to exclude on the basis of   radiography alone.        Consultant(s) Notes Reviewed:  ID, Rheum, Plastics    Care Discussed with Consultants/Other Providers: ID

## 2019-06-24 NOTE — PROGRESS NOTE ADULT - SUBJECTIVE AND OBJECTIVE BOX
89y Female admitted with Right wrist pain  .    Patient seen and examined bedside resting comfortably.  No complaints offered.     T(F): 97.6 (06-24-19 @ 11:12), Max: 98.6 (06-23-19 @ 21:38)  HR: 70 (06-24-19 @ 11:12) (70 - 78)  BP: 139/79 (06-24-19 @ 11:12) (139/79 - 146/73)  RR: 18 (06-24-19 @ 11:12) (18 - 18)  SpO2: 92% (06-24-19 @ 11:12) (92% - 95%)  Wt(kg): --  CAPILLARY BLOOD GLUCOSE          PHYSICAL EXAM:  General: NAD      Extremities: slight improvement since yesterday, dorsal edema and redness decreased.  redness localizing along radial wrist border but nontender     LABS:    06-24    128<L>  |  91<L>  |  19  ----------------------------<  117<H>  4.0   |  26  |  0.39<L>    Ca    8.9      24 Jun 2019 06:49        I&O's Detail    23 Jun 2019 07:01  -  24 Jun 2019 07:00  --------------------------------------------------------  IN:    IV PiggyBack: 250 mL    Oral Fluid: 800 mL  Total IN: 1050 mL    OUT:    Voided: 300 mL  Total OUT: 300 mL    Total NET: 750 mL      24 Jun 2019 07:01  -  24 Jun 2019 12:38  --------------------------------------------------------  IN:    Oral Fluid: 350 mL  Total IN: 350 mL    OUT:  Total OUT: 0 mL    Total NET: 350 mL          Impression: 89y Female admitted with Right wrist pain         Plan:  -suggest consult IR for possible aspiration/drainage  -abx per id  -hand elevation

## 2019-06-24 NOTE — PROGRESS NOTE ADULT - ASSESSMENT
89 y/o woman w/ PMH HLD, mild dementia, R cataract c/b corneal injury, ?prior subclinical L basal ganglia CVA p/w R wrist pain and swelling x 2 days, concerning for possible wrist/forearm  cellulitis vs gout vs septic arthitis  MRI as above  Blood Cx negative  Rheum/plastics input noted  ?IR aspiration   Rec:  1) follow blood Cx  2) continue IV vanco + ceftriaxone   Increase vanco dose to 750 q 12 and check levels and creatinine prior to 4th   3) ? aspiration if feasible  4) In view of tenosynovitis  ? OM will need long course-atleast 4 weeks   Joint fluid Cx may help with choice of antimicrobials else empiric vanco,ceftriaxone via PICC    Will tailor plan for ID issues  per course,results.Will defer to primary team on management of other issues.  Case d/w Med NP, attending   Will Follow.  Beeper [id number]-hzuh/afterhours/No response-[id number]

## 2019-06-25 LAB
ANION GAP SERPL CALC-SCNC: 10 MMOL/L — SIGNIFICANT CHANGE UP (ref 5–17)
APTT BLD: 31.7 SEC — SIGNIFICANT CHANGE UP (ref 27.5–36.3)
BUN SERPL-MCNC: 14 MG/DL — SIGNIFICANT CHANGE UP (ref 7–23)
CALCIUM SERPL-MCNC: 9 MG/DL — SIGNIFICANT CHANGE UP (ref 8.4–10.5)
CHLORIDE SERPL-SCNC: 90 MMOL/L — LOW (ref 96–108)
CO2 SERPL-SCNC: 30 MMOL/L — SIGNIFICANT CHANGE UP (ref 22–31)
CREAT SERPL-MCNC: 0.42 MG/DL — LOW (ref 0.5–1.3)
CULTURE RESULTS: SIGNIFICANT CHANGE UP
CULTURE RESULTS: SIGNIFICANT CHANGE UP
GLUCOSE SERPL-MCNC: 119 MG/DL — HIGH (ref 70–99)
HCT VFR BLD CALC: 41.1 % — SIGNIFICANT CHANGE UP (ref 34.5–45)
HGB BLD-MCNC: 14 G/DL — SIGNIFICANT CHANGE UP (ref 11.5–15.5)
INR BLD: 1.6 RATIO — HIGH (ref 0.88–1.16)
MCHC RBC-ENTMCNC: 30.6 PG — SIGNIFICANT CHANGE UP (ref 27–34)
MCHC RBC-ENTMCNC: 34.1 GM/DL — SIGNIFICANT CHANGE UP (ref 32–36)
MCV RBC AUTO: 89.7 FL — SIGNIFICANT CHANGE UP (ref 80–100)
PLATELET # BLD AUTO: 218 K/UL — SIGNIFICANT CHANGE UP (ref 150–400)
POTASSIUM SERPL-MCNC: 3.9 MMOL/L — SIGNIFICANT CHANGE UP (ref 3.5–5.3)
POTASSIUM SERPL-SCNC: 3.9 MMOL/L — SIGNIFICANT CHANGE UP (ref 3.5–5.3)
PROTHROM AB SERPL-ACNC: 18.7 SEC — HIGH (ref 10–13.1)
RBC # BLD: 4.58 M/UL — SIGNIFICANT CHANGE UP (ref 3.8–5.2)
RBC # FLD: 14.1 % — SIGNIFICANT CHANGE UP (ref 10.3–14.5)
SODIUM SERPL-SCNC: 130 MMOL/L — LOW (ref 135–145)
SPECIMEN SOURCE: SIGNIFICANT CHANGE UP
SPECIMEN SOURCE: SIGNIFICANT CHANGE UP
WBC # BLD: 6.89 K/UL — SIGNIFICANT CHANGE UP (ref 3.8–10.5)
WBC # FLD AUTO: 6.89 K/UL — SIGNIFICANT CHANGE UP (ref 3.8–10.5)

## 2019-06-25 PROCEDURE — 99233 SBSQ HOSP IP/OBS HIGH 50: CPT

## 2019-06-25 PROCEDURE — 99232 SBSQ HOSP IP/OBS MODERATE 35: CPT

## 2019-06-25 RX ADMIN — CEFTRIAXONE 100 MILLIGRAM(S): 500 INJECTION, POWDER, FOR SOLUTION INTRAMUSCULAR; INTRAVENOUS at 17:37

## 2019-06-25 RX ADMIN — Medication 1 DROP(S): at 17:38

## 2019-06-25 RX ADMIN — Medication 250 MILLIGRAM(S): at 23:54

## 2019-06-25 RX ADMIN — SIMVASTATIN 10 MILLIGRAM(S): 20 TABLET, FILM COATED ORAL at 22:11

## 2019-06-25 RX ADMIN — Medication 10 MILLIGRAM(S): at 17:37

## 2019-06-25 RX ADMIN — HEPARIN SODIUM 5000 UNIT(S): 5000 INJECTION INTRAVENOUS; SUBCUTANEOUS at 17:38

## 2019-06-25 RX ADMIN — HEPARIN SODIUM 5000 UNIT(S): 5000 INJECTION INTRAVENOUS; SUBCUTANEOUS at 06:12

## 2019-06-25 RX ADMIN — Medication 1 DROP(S): at 06:12

## 2019-06-25 RX ADMIN — Medication 250 MILLIGRAM(S): at 12:16

## 2019-06-25 NOTE — PROGRESS NOTE ADULT - PROBLEM SELECTOR PLAN 2
- Pt with h/o dementia   -  Pt now with delirium in setting of multiple factors (Hospital admission, pain, ?infection) ; improving  - c/w Tylenol 650mg PO Q6H PRN Pain   -c/w Seroquel 12.5mg PO q 12 prn for agitation  -Frequent re-orientation

## 2019-06-25 NOTE — PROGRESS NOTE ADULT - SUBJECTIVE AND OBJECTIVE BOX
Patient is a 89y old  Female who presents with a chief complaint of r/o R wrist infection (25 Jun 2019 09:54)    Being followed by ID for R wrist ,arm and elbow cellulitis,tenosynovitis    Interval history:denies arm pain   No acute events      ROS:  No cough,SOB,CP  No N/V/D./abd pain  No other complaints      Antimicrobials:    cefTRIAXone   IVPB 1000 milliGRAM(s) IV Intermittent every 24 hours  vancomycin  IVPB 750 milliGRAM(s) IV Intermittent every 12 hours    Other medications reviewed    Vital Signs Last 24 Hrs  T(C): 36.4 (06-25-19 @ 06:10), Max: 36.5 (06-24-19 @ 20:29)  T(F): 97.5 (06-25-19 @ 06:10), Max: 97.7 (06-24-19 @ 20:29)  HR: 69 (06-25-19 @ 06:10) (69 - 79)  BP: 155/76 (06-25-19 @ 06:10) (128/65 - 155/76)  BP(mean): --  RR: 18 (06-25-19 @ 06:10) (18 - 18)  SpO2: 95% (06-25-19 @ 06:10) (92% - 98%)    Physical Exam:    HEENT PERRLA EOMI    No oral exudate or erythema    Chest Good AE,CTA    CVS RRR S1 S2 WNl ESM  or rub or gallop    Abd soft BS normal No tenderness no masses    IV site no erythema tenderness or discharge  R wrist /forearm/elbow some erythema   wrist painful ROM  appearance overall improved    CNS AAO X 3 no focal      Lab Data:                          14.0   6.89  )-----------( 218      ( 25 Jun 2019 09:04 )             41.1       06-25    130<L>  |  90<L>  |  14  ----------------------------<  119<H>  3.9   |  30  |  0.42<L>    Ca    9.0      25 Jun 2019 06:20          Culture - Urine (collected 20 Jun 2019 20:33)  Source: .Urine  Final Report (21 Jun 2019 16:29):    No growth    Culture - Blood (collected 20 Jun 2019 17:35)  Source: .Blood  Preliminary Report (21 Jun 2019 18:01):    No growth to date.    Culture - Blood (collected 20 Jun 2019 17:35)  Source: .Blood  Preliminary Report (21 Jun 2019 18:01):    No growth to date.            Vancomycin Level, Trough: <4.0 ug/mL (06-24-19 @ 12:14)

## 2019-06-25 NOTE — PROGRESS NOTE ADULT - SUBJECTIVE AND OBJECTIVE BOX
89y Female admitted with Right wrist pain  .    Patient seen and examined bedside resting comfortably.  No complaints offered.     T(F): 97.7 (06-25-19 @ 11:37), Max: 97.7 (06-24-19 @ 20:29)  HR: 69 (06-25-19 @ 11:37) (69 - 79)  BP: 145/76 (06-25-19 @ 11:37) (128/65 - 155/76)  RR: 18 (06-25-19 @ 11:37) (18 - 18)  SpO2: 95% (06-25-19 @ 11:37) (95% - 98%)  Wt(kg): --  CAPILLARY BLOOD GLUCOSE          PHYSICAL EXAM:  General: NAD      Extremities: appears nontender with active ROM right wrist.  edema and redness mostly distal volar and ulnar       LABS:                        14.0   6.89  )-----------( 218      ( 25 Jun 2019 09:04 )             41.1     06-25    130<L>  |  90<L>  |  14  ----------------------------<  119<H>  3.9   |  30  |  0.42<L>    Ca    9.0      25 Jun 2019 06:20      PT/INR - ( 25 Jun 2019 09:22 )   PT: 18.7 sec;   INR: 1.60 ratio         PTT - ( 25 Jun 2019 09:22 )  PTT:31.7 sec  I&O's Detail    24 Jun 2019 07:01  -  25 Jun 2019 07:00  --------------------------------------------------------  IN:    IV PiggyBack: 600 mL    Oral Fluid: 940 mL  Total IN: 1540 mL    OUT:    Voided: 200 mL  Total OUT: 200 mL    Total NET: 1340 mL      25 Jun 2019 07:01  -  25 Jun 2019 15:42  --------------------------------------------------------  IN:    Oral Fluid: 360 mL  Total IN: 360 mL    OUT:  Total OUT: 0 mL    Total NET: 360 mL          Impression: 89y Female admitted with Right wrist pain  ID note reviewed.  There is added risk to attempt bedside wrist aspiration for patient and MD due to patient's dementia and patient appears to be improving with antibiotics      Plan:  -continue monitor wrist

## 2019-06-25 NOTE — OCCUPATIONAL THERAPY INITIAL EVALUATION ADULT - ADDITIONAL COMMENTS
(CONT) It is exacerbated with any movement of the R wrist (flexion/extension/abduction/adduction) and has some relief with tylenol. She has had some difficulty w/ finger extension, as well. Reports some mild chills, but no fevers. Denies any preceding skin tear/breakdown in the R wrist area. No drainage or pus. Denies recent falls, no wrist trauma. CT HEAD (6/20): (-) MRI WRIST (6/21): Partially included marked flexor digitorum tenosynovitis, moderate flexor carpi radialis tenosynovitis and mild abductor pollicis longus tenosynovitis. Small distal radioulnar joint effusion and radiocarpal joint effusion with synovitis. Diffuse soft tissue edema and subcutaneous edema. XRAY WRIST (6/20): The bones are diffusely demineralized. No discrete fracture is visualized. There is widening of the scapholunate ligament, consistent with ligamentous insufficiency-prior injury. There is chondrocalcinosis. 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 There is soft tissue swelling about the carpus.

## 2019-06-25 NOTE — OCCUPATIONAL THERAPY INITIAL EVALUATION ADULT - RANGE OF MOTION EXAMINATION, LOWER EXTREMITY
Left LE Active ROM was WNL  (within normal limits)/Right LE Active ROM was WNL(within normal limits)/Right LE Passive ROM was WNL (within normal limits)/Left LE Passive ROM was WNL (within normal limits)

## 2019-06-25 NOTE — PROGRESS NOTE ADULT - PROBLEM SELECTOR PLAN 1
- per ID, Rheum septic arthritis more likely  - ROM is improving on abx  - MRI wrist shows small effusion and significant tenosynovitis  - In view of tenosynovitis  ? OM will need long abx course atleast 4 weeks   - Joint fluid Cx may help with choice of antimicrobials   - IR reccs [facility] team consult for joint aspiration  - c/w Ceftriaxone , Vancomycin for now

## 2019-06-25 NOTE — OCCUPATIONAL THERAPY INITIAL EVALUATION ADULT - PERTINENT HX OF CURRENT PROBLEM, REHAB EVAL
89F with R wrist pain and swelling x 2 days. Patient reports she began feeling a subtle  dull pain in her R wrist while at home trying to use her walker. She was unable to put weight on her R hand. She noticed "redness" on tracking from just distal to her elbow to her R hand, symptoms that progressed over the past 24 hours. (CONT below)

## 2019-06-25 NOTE — OCCUPATIONAL THERAPY INITIAL EVALUATION ADULT - LIVES WITH, PROFILE
Per chart, pt lives with daughter in an apartment with elevator. Pt utilizes a RW at baseline. Pt has a HHA 4 hrs a day M-F. Pt was independent with ADLs./children

## 2019-06-25 NOTE — OCCUPATIONAL THERAPY INITIAL EVALUATION ADULT - RANGE OF MOTION EXAMINATION, UPPER EXTREMITY
Left UE Passive ROM was WNL (within normal limits)/RUE wrist and digit extension AROM decreased secondary to pain (~3/4 range)/Left UE Active ROM was WNL (within normal limits)/Right UE Passive ROM was WNL (within normal limits)

## 2019-06-25 NOTE — OCCUPATIONAL THERAPY INITIAL EVALUATION ADULT - BALANCE TRAINING, PT EVAL
Pt will improve dynamic sitting balance by 1/2 grade to improve functional performance with ADLs within 4 weeks.

## 2019-06-25 NOTE — OCCUPATIONAL THERAPY INITIAL EVALUATION ADULT - DIAGNOSIS, OT EVAL
impaired balance, decreased strength, decreased AROM, decreased endurance, decreased vision, cognitive impairments

## 2019-06-25 NOTE — PROGRESS NOTE ADULT - SUBJECTIVE AND OBJECTIVE BOX
Patient is a 89y old  Female who presents with a chief complaint of r/o R wrist infection (24 Jun 2019 17:05)      SUBJECTIVE / OVERNIGHT EVENTS:    MEDICATIONS  (STANDING):  cefTRIAXone   IVPB 1000 milliGRAM(s) IV Intermittent every 24 hours  heparin  Injectable 5000 Unit(s) SubCutaneous every 12 hours  prednisoLONE acetate 1% Suspension 1 Drop(s) Right EYE every 12 hours  simvastatin 10 milliGRAM(s) Oral at bedtime  vancomycin  IVPB 750 milliGRAM(s) IV Intermittent every 12 hours    MEDICATIONS  (PRN):  acetaminophen   Tablet .. 650 milliGRAM(s) Oral every 6 hours PRN Mild Pain (1 - 3)  QUEtiapine 12.5 milliGRAM(s) Oral every 12 hours PRN agitation  traMADol 25 milliGRAM(s) Oral every 6 hours PRN moderate to severe pain      Vital Signs Last 24 Hrs  T(C): 36.4 (25 Jun 2019 06:10), Max: 36.5 (24 Jun 2019 20:29)  T(F): 97.5 (25 Jun 2019 06:10), Max: 97.7 (24 Jun 2019 20:29)  HR: 69 (25 Jun 2019 06:10) (69 - 79)  BP: 155/76 (25 Jun 2019 06:10) (128/65 - 155/76)  BP(mean): --  RR: 18 (25 Jun 2019 06:10) (18 - 18)  SpO2: 95% (25 Jun 2019 06:10) (92% - 98%)  CAPILLARY BLOOD GLUCOSE        I&O's Summary    24 Jun 2019 07:01  -  25 Jun 2019 07:00  --------------------------------------------------------  IN: 1540 mL / OUT: 200 mL / NET: 1340 mL        PHYSICAL EXAM:  GENERAL: NAD, well-developed  HEAD:  Atraumatic, Normocephalic  EYES: EOMI, PERRLA, conjunctiva and sclera clear  NECK: Supple, No JVD  CHEST/LUNG: Clear to auscultation bilaterally; No wheeze  HEART: Regular rate and rhythm; No murmurs, rubs, or gallops  ABDOMEN: Soft, Nontender, Nondistended; Bowel sounds present  EXTREMITIES:  2+ Peripheral Pulses, No clubbing, cyanosis, or edema  PSYCH: AAOx3  NEUROLOGY: non-focal  SKIN: No rashes or lesions    LABS:                        14.0   6.89  )-----------( 218      ( 25 Jun 2019 09:04 )             41.1     06-25    130<L>  |  90<L>  |  14  ----------------------------<  119<H>  3.9   |  30  |  0.42<L>    Ca    9.0      25 Jun 2019 06:20                RADIOLOGY & ADDITIONAL TESTS:    Imaging Personally Reviewed:    Consultant(s) Notes Reviewed:      Care Discussed with Consultants/Other Providers: Patient is a 89y old  Female who presents with a chief complaint of r/o R wrist infection (24 Jun 2019 17:05)      SUBJECTIVE / OVERNIGHT EVENTS:  Pt seen and examined. No acute events overnight. Denies right UE pain today.     MEDICATIONS  (STANDING):  cefTRIAXone   IVPB 1000 milliGRAM(s) IV Intermittent every 24 hours  heparin  Injectable 5000 Unit(s) SubCutaneous every 12 hours  prednisoLONE acetate 1% Suspension 1 Drop(s) Right EYE every 12 hours  simvastatin 10 milliGRAM(s) Oral at bedtime  vancomycin  IVPB 750 milliGRAM(s) IV Intermittent every 12 hours    MEDICATIONS  (PRN):  acetaminophen   Tablet .. 650 milliGRAM(s) Oral every 6 hours PRN Mild Pain (1 - 3)  QUEtiapine 12.5 milliGRAM(s) Oral every 12 hours PRN agitation  traMADol 25 milliGRAM(s) Oral every 6 hours PRN moderate to severe pain      Vital Signs Last 24 Hrs  T(C): 36.4 (25 Jun 2019 06:10), Max: 36.5 (24 Jun 2019 20:29)  T(F): 97.5 (25 Jun 2019 06:10), Max: 97.7 (24 Jun 2019 20:29)  HR: 69 (25 Jun 2019 06:10) (69 - 79)  BP: 155/76 (25 Jun 2019 06:10) (128/65 - 155/76)  BP(mean): --  RR: 18 (25 Jun 2019 06:10) (18 - 18)  SpO2: 95% (25 Jun 2019 06:10) (92% - 98%)  CAPILLARY BLOOD GLUCOSE        I&O's Summary    24 Jun 2019 07:01  -  25 Jun 2019 07:00  --------------------------------------------------------  IN: 1540 mL / OUT: 200 mL / NET: 1340 mL        PHYSICAL EXAM:  GENERAL: NAD, cachetic, frail  NECK: Supple, No JVD  CHEST: Cear to auscultation bilaterally; No wheeze  HEART: +S1/S2, reg   ABDOMEN: Soft, Nontender, Nondistended; Bowel sounds present  EXTREMITIES:  R wrist with improving swelling and erythema ; + R elbow effusion, erythema  PSYCH: Alert and awake, oriented to self, answering questions appropriately and following commands     LABS:                        14.0   6.89  )-----------( 218      ( 25 Jun 2019 09:04 )             41.1     06-25    130<L>  |  90<L>  |  14  ----------------------------<  119<H>  3.9   |  30  |  0.42<L>    Ca    9.0      25 Jun 2019 06:20                  Consultant(s) Notes Reviewed:  ID

## 2019-06-25 NOTE — PROGRESS NOTE ADULT - ASSESSMENT
89 yo F hx HLD, dementia p/w R wrist pain and swelling.  Now with oligoarticular inflammatory arthritis.     #oligoarticular inflammatory arthritis (wrist and elbow).   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    -Chondrocalcinosis on xray, more than 1 joint involvement, afebrile and normal wbc more suggestive of crystalline process.   -Possible patient has septic joint of wrist with some underlying CPPD in her R wrist and R elbow (especially since R elbow symptoms appeared while on antibiotics)  -No aspiration at this time, will be empirically treated for septic arthritis    - continue abx  - ice to affected joints  - spoke with ID, will give trial of solumedrol 10mg IV x1 now to look for any improvement in symptoms      Asim Castaneda MD  Rheumatology Fellow PGY IV 89 yo F hx HLD, dementia p/w R wrist pain and swelling.  Now with oligoarticular inflammatory arthritis.     #oligoarticular inflammatory arthritis (wrist and elbow).   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    -Chondrocalcinosis on xray, more than 1 joint involvement, afebrile and normal wbc more suggestive of crystalline process.   -Possible patient has septic joint of wrist with some underlying CPPD in her R wrist and R elbow (especially since R elbow symptoms appeared while on antibiotics)  -No aspiration at this time, will be empirically treated for septic arthritis    - continue abx  - ice to affected joints  - spoke with ID, will give trial of solumedrol 10mg IV x1 for possible pseudogout now to look for any improvement in symptoms      Asim Castaneda MD  Rheumatology Fellow PGY IV

## 2019-06-25 NOTE — PROGRESS NOTE ADULT - SUBJECTIVE AND OBJECTIVE BOX
PEDRO BYRD  [id number]    INTERVAL HPI/OVERNIGHT EVENTS:    No overnight events, no IR aspiration given small fluid collection and patient already on antibiotics. Patient with stable physical exam compared to prior.    MEDICATIONS  (STANDING):  cefTRIAXone   IVPB 1000 milliGRAM(s) IV Intermittent every 24 hours  heparin  Injectable 5000 Unit(s) SubCutaneous every 12 hours  methylPREDNISolone sodium succinate Injectable 10 milliGRAM(s) IV Push once  prednisoLONE acetate 1% Suspension 1 Drop(s) Right EYE every 12 hours  simvastatin 10 milliGRAM(s) Oral at bedtime  vancomycin  IVPB 750 milliGRAM(s) IV Intermittent every 12 hours    MEDICATIONS  (PRN):  acetaminophen   Tablet .. 650 milliGRAM(s) Oral every 6 hours PRN Mild Pain (1 - 3)  QUEtiapine 12.5 milliGRAM(s) Oral every 12 hours PRN agitation      Allergies    No Known Allergies    Intolerances    penicillin (Stomach Upset)      Review of Systems:   +joint pains      Vital Signs Last 24 Hrs  T(C): 36.5 (25 Jun 2019 11:37), Max: 36.5 (24 Jun 2019 20:29)  T(F): 97.7 (25 Jun 2019 11:37), Max: 97.7 (24 Jun 2019 20:29)  HR: 69 (25 Jun 2019 11:37) (69 - 79)  BP: 145/76 (25 Jun 2019 11:37) (128/65 - 155/76)  BP(mean): --  RR: 18 (25 Jun 2019 11:37) (18 - 18)  SpO2: 95% (25 Jun 2019 11:37) (95% - 98%)    Physical Exam:  General: NAD - Awake, intermittently agitated  MSK: R wrist with continued improvement in swelling, edema and erythema, 20 degrees flexion and extension.  Able to touch the joint now without discomfort.   Also with improved R elbow effusion, erythema, can extend 15 degrees  Neuro: alert  Skin: erythema over the R wrist and elbow      LABS:                        14.0   6.89  )-----------( 218      ( 25 Jun 2019 09:04 )             41.1     06-25    130<L>  |  90<L>  |  14  ----------------------------<  119<H>  3.9   |  30  |  0.42<L>    Ca    9.0      25 Jun 2019 06:20      PT/INR - ( 25 Jun 2019 09:22 )   PT: 18.7 sec;   INR: 1.60 ratio         PTT - ( 25 Jun 2019 09:22 )  PTT:31.7 sec        RADIOLOGY & ADDITIONAL TESTS:      EXAM:  WRIST COMPLETE RIGHT-MIN 3 VIEWS                            PROCEDURE DATE:  06/20/2019            INTERPRETATION:  CLINICAL INDICATION: Erythema and swelling along the   dorsal wrist. Status post fall.    EXAM: PA, lateral and oblique viewsof the right wrist    COMPARISON: None      IMPRESSION:   The bones are diffusely demineralized. No discrete fracture is   visualized. There is widening of the scapholunate ligament, consistent   with ligamentous insufficiency-prior injury. There is chondrocalcinosis.   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 There is soft tissue   swelling about the carpus.    EXAM:  MR WRIST RT                            PROCEDURE DATE:  06/21/2019            INTERPRETATION:  Clinical Information: Right wrist swelling, low-grade   fever, concern for joint abscess.    Comparison: None.    Technique: MRI of the wrist was performed utilizing multiplanar imaging   without the administration of intravenous contrast. The study was   terminated prematurely as the patient was unable to tolerate the   examination.     Findings:     Evaluation is markedly limited due to an incomplete study, motion   artifact and inhomogeneous fat saturation. There is a partially included   marked flexor digitorum tenosynovitis most prominent at the level of the   distal radius and ulna. There is moderate flexor carpi radialis   tenosynovitis with a suggestion of septations. There is mild abductor   pollicis longus tenosynovitis. There is a small distal radioulnar joint   effusion. There is likely a radiocarpal joint effusion with synovitis   although the radiocarpal region was not included on the sagittal   sequence. There is diffuse soft tissue edema and subcutaneous edema.   There is a suggestion of hypointense marrow signal within the scaphoid on   the T1-weighted sequence and there are rounded foci of marrow signal   abnormality throughout the visualized osseous structures which are not   well evaluated on this incomplete study.    Impression:   Markedly limited incomplete evaluation.  Partially included marked flexor digitorum tenosynovitis, moderate flexor   carpi radialis tenosynovitis and mild abductor pollicis longus   tenosynovitis. Small distal radioulnar joint effusion and radiocarpal   joint effusion with synovitis. Diffuse soft tissue edema and subcutaneous   edema. These findings are likely infectious given the clinical history.   Nonspecific marrow signal abnormality which is incompletely evaluated on   this study; osteomyelitis remains in the differential.

## 2019-06-26 LAB
ANION GAP SERPL CALC-SCNC: 13 MMOL/L — SIGNIFICANT CHANGE UP (ref 5–17)
BUN SERPL-MCNC: 15 MG/DL — SIGNIFICANT CHANGE UP (ref 7–23)
CALCIUM SERPL-MCNC: 8.9 MG/DL — SIGNIFICANT CHANGE UP (ref 8.4–10.5)
CHLORIDE SERPL-SCNC: 91 MMOL/L — LOW (ref 96–108)
CO2 SERPL-SCNC: 25 MMOL/L — SIGNIFICANT CHANGE UP (ref 22–31)
CREAT SERPL-MCNC: 0.35 MG/DL — LOW (ref 0.5–1.3)
GLUCOSE SERPL-MCNC: 152 MG/DL — HIGH (ref 70–99)
INR BLD: 1.42 RATIO — HIGH (ref 0.88–1.16)
POTASSIUM SERPL-MCNC: 4.1 MMOL/L — SIGNIFICANT CHANGE UP (ref 3.5–5.3)
POTASSIUM SERPL-SCNC: 4.1 MMOL/L — SIGNIFICANT CHANGE UP (ref 3.5–5.3)
PROTHROM AB SERPL-ACNC: 16.4 SEC — HIGH (ref 10–13.1)
SODIUM SERPL-SCNC: 129 MMOL/L — LOW (ref 135–145)
VANCOMYCIN TROUGH SERPL-MCNC: 11.8 UG/ML — SIGNIFICANT CHANGE UP (ref 10–20)

## 2019-06-26 PROCEDURE — 99231 SBSQ HOSP IP/OBS SF/LOW 25: CPT

## 2019-06-26 PROCEDURE — 99233 SBSQ HOSP IP/OBS HIGH 50: CPT

## 2019-06-26 PROCEDURE — 71045 X-RAY EXAM CHEST 1 VIEW: CPT | Mod: 26

## 2019-06-26 PROCEDURE — 99232 SBSQ HOSP IP/OBS MODERATE 35: CPT

## 2019-06-26 RX ADMIN — CEFTRIAXONE 100 MILLIGRAM(S): 500 INJECTION, POWDER, FOR SOLUTION INTRAMUSCULAR; INTRAVENOUS at 18:11

## 2019-06-26 RX ADMIN — Medication 1 DROP(S): at 06:32

## 2019-06-26 RX ADMIN — SIMVASTATIN 10 MILLIGRAM(S): 20 TABLET, FILM COATED ORAL at 21:15

## 2019-06-26 RX ADMIN — HEPARIN SODIUM 5000 UNIT(S): 5000 INJECTION INTRAVENOUS; SUBCUTANEOUS at 06:32

## 2019-06-26 RX ADMIN — Medication 1 DROP(S): at 17:05

## 2019-06-26 RX ADMIN — Medication 250 MILLIGRAM(S): at 13:30

## 2019-06-26 RX ADMIN — HEPARIN SODIUM 5000 UNIT(S): 5000 INJECTION INTRAVENOUS; SUBCUTANEOUS at 17:05

## 2019-06-26 NOTE — PROGRESS NOTE ADULT - SUBJECTIVE AND OBJECTIVE BOX
Patient is a 89y old  Female who presents with a chief complaint of r/o R wrist infection (25 Jun 2019 16:46)      SUBJECTIVE / OVERNIGHT EVENTS:  Pt seen and examined. No acute events overnight. Denies right wrist pain.    MEDICATIONS  (STANDING):  cefTRIAXone   IVPB 1000 milliGRAM(s) IV Intermittent every 24 hours  heparin  Injectable 5000 Unit(s) SubCutaneous every 12 hours  prednisoLONE acetate 1% Suspension 1 Drop(s) Right EYE every 12 hours  simvastatin 10 milliGRAM(s) Oral at bedtime  vancomycin  IVPB 750 milliGRAM(s) IV Intermittent every 12 hours    MEDICATIONS  (PRN):  acetaminophen   Tablet .. 650 milliGRAM(s) Oral every 6 hours PRN Mild Pain (1 - 3)  QUEtiapine 12.5 milliGRAM(s) Oral every 12 hours PRN agitation      Vital Signs Last 24 Hrs  T(C): 36.2 (26 Jun 2019 06:27), Max: 36.5 (25 Jun 2019 11:37)  T(F): 97.2 (26 Jun 2019 06:27), Max: 97.7 (25 Jun 2019 11:37)  HR: 70 (26 Jun 2019 06:27) (69 - 80)  BP: 146/73 (26 Jun 2019 06:27) (131/54 - 146/73)  BP(mean): --  RR: 18 (26 Jun 2019 06:27) (18 - 18)  SpO2: 95% (26 Jun 2019 06:27) (82% - 95%)  CAPILLARY BLOOD GLUCOSE        I&O's Summary    25 Jun 2019 07:01  -  26 Jun 2019 07:00  --------------------------------------------------------  IN: 720 mL / OUT: 0 mL / NET: 720 mL        PHYSICAL EXAM:  GENERAL: NAD, cachetic, frail  NECK: Supple, No JVD  CHEST: Cear to auscultation bilaterally; No wheeze  HEART: +S1/S2, reg   ABDOMEN: Soft, Nontender, Nondistended; Bowel sounds present  EXTREMITIES:  R wrist with much improved swelling and erythema ; + R elbow effusion, erythema  PSYCH: Alert and awake, oriented to self only      LABS:                        14.0   6.89  )-----------( 218      ( 25 Jun 2019 09:04 )             41.1     06-26    129<L>  |  91<L>  |  15  ----------------------------<  152<H>  4.1   |  25  |  0.35<L>    Ca    8.9      26 Jun 2019 07:17      PT/INR - ( 26 Jun 2019 08:51 )   PT: 16.4 sec;   INR: 1.42 ratio         PTT - ( 25 Jun 2019 09:22 )  PTT:31.7 sec

## 2019-06-26 NOTE — PROGRESS NOTE ADULT - SUBJECTIVE AND OBJECTIVE BOX
Follow Up:  inflammatory arthritis of right wrist and elbow    Interval History/ROS: able to flex wrist, anxious for discharge    Allergies  No Known Allergies    ANTIMICROBIALS:  cefTRIAXone   IVPB 1000 every 24 hours  vancomycin  IVPB 750 every 12 hours    OTHER MEDS:  MEDICATIONS  (STANDING):  acetaminophen   Tablet .. 650 every 6 hours PRN  heparin  Injectable 5000 every 12 hours  QUEtiapine 12.5 every 12 hours PRN  simvastatin 10 at bedtime      Vital Signs Last 24 Hrs  T(C): 36.6 (26 Jun 2019 13:15), Max: 36.6 (26 Jun 2019 13:15)  T(F): 97.8 (26 Jun 2019 13:15), Max: 97.8 (26 Jun 2019 13:15)  HR: 82 (26 Jun 2019 13:15) (70 - 82)  BP: 126/72 (26 Jun 2019 13:15) (126/72 - 146/73)  BP(mean): --  RR: 18 (26 Jun 2019 13:15) (18 - 18)  SpO2: 94% (26 Jun 2019 13:15) (82% - 95%)    PHYSICAL EXAM:  General: thin, NAD, Non-toxic  Neurology: A&Ox3, nonfocal  Respiratory: Clear to auscultation bilaterally  CV: RRR, S1S2, no murmurs, rubs or gallops  Abdominal: Soft, Non-tender, non-distended, normal bowel sounds  Extremities: No edema, dull residual erythema right wrist and right elbow. able to actively flex wrist  Line Sites: Clear  Skin: No rash                        14.0   6.89  )-----------( 218      ( 25 Jun 2019 09:04 )             41.1   WBC Count: 6.89 (06-25 @ 09:04)  WBC Count: 9.33 (06-22 @ 11:25)      06-26    129<L>  |  91<L>  |  15  ----------------------------<  152<H>  4.1   |  25  |  0.35<L>    Ca    8.9      26 Jun 2019 07:17    MICROBIOLOGY:  .Urine  06-20-19   No growth  --  --      .Blood  06-20-19   No growth at 5 days.  --  --    Vancomycin Level, Trough: 11.8 ug/mL (06-26-19 @ 12:43)    RADIOLOGY:    Yeison Byrd MD; Division of Infectious Disease; Pager: [phone]; nights and weekends: [phone]

## 2019-06-26 NOTE — PROGRESS NOTE ADULT - SUBJECTIVE AND OBJECTIVE BOX
PEDRO BYRD  [id number]    INTERVAL HPI/OVERNIGHT EVENTS:    No overnight events, exam continues to improve.    MEDICATIONS  (STANDING):  cefTRIAXone   IVPB 1000 milliGRAM(s) IV Intermittent every 24 hours  heparin  Injectable 5000 Unit(s) SubCutaneous every 12 hours  prednisoLONE acetate 1% Suspension 1 Drop(s) Right EYE every 12 hours  simvastatin 10 milliGRAM(s) Oral at bedtime  vancomycin  IVPB 750 milliGRAM(s) IV Intermittent every 12 hours    MEDICATIONS  (PRN):  acetaminophen   Tablet .. 650 milliGRAM(s) Oral every 6 hours PRN Mild Pain (1 - 3)  QUEtiapine 12.5 milliGRAM(s) Oral every 12 hours PRN agitation      Allergies    No Known Allergies    Intolerances    penicillin (Stomach Upset)      Review of Systems:   +joint pains      Vital Signs Last 24 Hrs  T(C): 36.6 (26 Jun 2019 13:15), Max: 36.6 (26 Jun 2019 13:15)  T(F): 97.8 (26 Jun 2019 13:15), Max: 97.8 (26 Jun 2019 13:15)  HR: 82 (26 Jun 2019 13:15) (70 - 82)  BP: 126/72 (26 Jun 2019 13:15) (126/72 - 146/73)  BP(mean): --  RR: 18 (26 Jun 2019 13:15) (18 - 18)  SpO2: 94% (26 Jun 2019 13:15) (82% - 95%)    Physical Exam:  General: NAD - Awake, intermittently agitated  MSK: R wrist with continued improvement in swelling, edema and erythema, 30 degrees flexion and extension.  Able to touch the joint now without discomfort.   Also with improved R elbow effusion, erythema, can extend 15 degrees  Neuro: alert  Skin: improved erythema over the R wrist and elbow    LABS:                        14.0   6.89  )-----------( 218      ( 25 Jun 2019 09:04 )             41.1     06-26    129<L>  |  91<L>  |  15  ----------------------------<  152<H>  4.1   |  25  |  0.35<L>    Ca    8.9      26 Jun 2019 07:17      PT/INR - ( 26 Jun 2019 08:51 )   PT: 16.4 sec;   INR: 1.42 ratio         PTT - ( 25 Jun 2019 09:22 )  PTT:31.7 sec        RADIOLOGY & ADDITIONAL TESTS:      EXAM:  WRIST COMPLETE RIGHT-MIN 3 VIEWS                            PROCEDURE DATE:  06/20/2019            INTERPRETATION:  CLINICAL INDICATION: Erythema and swelling along the   dorsal wrist. Status post fall.    EXAM: PA, lateral and oblique viewsof the right wrist    COMPARISON: None      IMPRESSION:   The bones are diffusely demineralized. No discrete fracture is   visualized. There is widening of the scapholunate ligament, consistent   with ligamentous insufficiency-prior injury. There is chondrocalcinosis.   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 There is soft tissue   swelling about the carpus.    EXAM:  MR WRIST RT                            PROCEDURE DATE:  06/21/2019            INTERPRETATION:  Clinical Information: Right wrist swelling, low-grade   fever, concern for joint abscess.    Comparison: None.    Technique: MRI of the wrist was performed utilizing multiplanar imaging   without the administration of intravenous contrast. The study was   terminated prematurely as the patient was unable to tolerate the   examination.     Findings:     Evaluation is markedly limited due to an incomplete study, motion   artifact and inhomogeneous fat saturation. There is a partially included   marked flexor digitorum tenosynovitis most prominent at the level of the   distal radius and ulna. There is moderate flexor carpi radialis   tenosynovitis with a suggestion of septations. There is mild abductor   pollicis longus tenosynovitis. There is a small distal radioulnar joint   effusion. There is likely a radiocarpal joint effusion with synovitis   although the radiocarpal region was not included on the sagittal   sequence. There is diffuse soft tissue edema and subcutaneous edema.   There is a suggestion of hypointense marrow signal within the scaphoid on   the T1-weighted sequence and there are rounded foci of marrow signal   abnormality throughout the visualized osseous structures which are not   well evaluated on this incomplete study.    Impression:   Markedly limited incomplete evaluation.  Partially included marked flexor digitorum tenosynovitis, moderate flexor   carpi radialis tenosynovitis and mild abductor pollicis longus   tenosynovitis. Small distal radioulnar joint effusion and radiocarpal   joint effusion with synovitis. Diffuse soft tissue edema and subcutaneous   edema. These findings are likely infectious given the clinical history.   Nonspecific marrow signal abnormality which is incompletely evaluated on   this study; osteomyelitis remains in the differential. PEDRO BYRD  [id number]    INTERVAL HPI/OVERNIGHT EVENTS:    No overnight events - no new complaints of pain.    MEDICATIONS  (STANDING):  cefTRIAXone   IVPB 1000 milliGRAM(s) IV Intermittent every 24 hours  heparin  Injectable 5000 Unit(s) SubCutaneous every 12 hours  prednisoLONE acetate 1% Suspension 1 Drop(s) Right EYE every 12 hours  simvastatin 10 milliGRAM(s) Oral at bedtime  vancomycin  IVPB 750 milliGRAM(s) IV Intermittent every 12 hours    MEDICATIONS  (PRN):  acetaminophen   Tablet .. 650 milliGRAM(s) Oral every 6 hours PRN Mild Pain (1 - 3)  QUEtiapine 12.5 milliGRAM(s) Oral every 12 hours PRN agitation      Allergies    No Known Allergies    Intolerances    penicillin (Stomach Upset)      Review of Systems:   +joint pains      Vital Signs Last 24 Hrs  T(C): 36.6 (26 Jun 2019 13:15), Max: 36.6 (26 Jun 2019 13:15)  T(F): 97.8 (26 Jun 2019 13:15), Max: 97.8 (26 Jun 2019 13:15)  HR: 82 (26 Jun 2019 13:15) (70 - 82)  BP: 126/72 (26 Jun 2019 13:15) (126/72 - 146/73)  BP(mean): --  RR: 18 (26 Jun 2019 13:15) (18 - 18)  SpO2: 94% (26 Jun 2019 13:15) (82% - 95%)    Physical Exam:  General: NAD - Awake, intermittently agitated  MSK: R wrist with continued improvement in swelling, edema and erythema.  Able to touch the joint now without discomfort.  R elbow effusion, erythema faint, cooler today, can extend 15 degrees - all improved  Neuro: alert  Skin: improved erythema over the R wrist and elbow    LABS:                        14.0   6.89  )-----------( 218      ( 25 Jun 2019 09:04 )             41.1     06-26    129<L>  |  91<L>  |  15  ----------------------------<  152<H>  4.1   |  25  |  0.35<L>    Ca    8.9      26 Jun 2019 07:17      PT/INR - ( 26 Jun 2019 08:51 )   PT: 16.4 sec;   INR: 1.42 ratio         PTT - ( 25 Jun 2019 09:22 )  PTT:31.7 sec        RADIOLOGY & ADDITIONAL TESTS:      EXAM:  WRIST COMPLETE RIGHT-MIN 3 VIEWS                            PROCEDURE DATE:  06/20/2019            INTERPRETATION:  CLINICAL INDICATION: Erythema and swelling along the   dorsal wrist. Status post fall.    EXAM: PA, lateral and oblique viewsof the right wrist    COMPARISON: None      IMPRESSION:   The bones are diffusely demineralized. No discrete fracture is   visualized. There is widening of the scapholunate ligament, consistent   with ligamentous insufficiency-prior injury. There is chondrocalcinosis.   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 There is soft tissue   swelling about the carpus.    EXAM:  MR WRIST RT                            PROCEDURE DATE:  06/21/2019            INTERPRETATION:  Clinical Information: Right wrist swelling, low-grade   fever, concern for joint abscess.    Comparison: None.    Technique: MRI of the wrist was performed utilizing multiplanar imaging   without the administration of intravenous contrast. The study was   terminated prematurely as the patient was unable to tolerate the   examination.     Findings:     Evaluation is markedly limited due to an incomplete study, motion   artifact and inhomogeneous fat saturation. There is a partially included   marked flexor digitorum tenosynovitis most prominent at the level of the   distal radius and ulna. There is moderate flexor carpi radialis   tenosynovitis with a suggestion of septations. There is mild abductor   pollicis longus tenosynovitis. There is a small distal radioulnar joint   effusion. There is likely a radiocarpal joint effusion with synovitis   although the radiocarpal region was not included on the sagittal   sequence. There is diffuse soft tissue edema and subcutaneous edema.   There is a suggestion of hypointense marrow signal within the scaphoid on   the T1-weighted sequence and there are rounded foci of marrow signal   abnormality throughout the visualized osseous structures which are not   well evaluated on this incomplete study.    Impression:   Markedly limited incomplete evaluation.  Partially included marked flexor digitorum tenosynovitis, moderate flexor   carpi radialis tenosynovitis and mild abductor pollicis longus   tenosynovitis. Small distal radioulnar joint effusion and radiocarpal   joint effusion with synovitis. Diffuse soft tissue edema and subcutaneous   edema. These findings are likely infectious given the clinical history.   Nonspecific marrow signal abnormality which is incompletely evaluated on   this study; osteomyelitis remains in the differential.

## 2019-06-26 NOTE — PROGRESS NOTE ADULT - SUBJECTIVE AND OBJECTIVE BOX
89y Female admitted with Right wrist pain  .    Patient seen and examined bedside resting comfortably.  No complaints offered.  Using right hand to feed self    T(F): 97.8 (06-26-19 @ 13:15), Max: 97.8 (06-26-19 @ 13:15)  HR: 82 (06-26-19 @ 13:15) (70 - 82)  BP: 126/72 (06-26-19 @ 13:15) (126/72 - 146/73)  RR: 18 (06-26-19 @ 13:15) (18 - 18)  SpO2: 94% (06-26-19 @ 13:15) (82% - 95%)  Wt(kg): --  CAPILLARY BLOOD GLUCOSE          PHYSICAL EXAM:  General: NAD   Extremities: LUE picc line in place.  good ROM, nontender, mild edema, no redness right wrist  :     LABS:                        14.0   6.89  )-----------( 218      ( 25 Jun 2019 09:04 )             41.1     06-26    129<L>  |  91<L>  |  15  ----------------------------<  152<H>  4.1   |  25  |  0.35<L>    Ca    8.9      26 Jun 2019 07:17      PT/INR - ( 26 Jun 2019 08:51 )   PT: 16.4 sec;   INR: 1.42 ratio         PTT - ( 25 Jun 2019 09:22 )  PTT:31.7 sec  I&O's Detail    25 Jun 2019 07:01  -  26 Jun 2019 07:00  --------------------------------------------------------  IN:    Oral Fluid: 720 mL  Total IN: 720 mL    OUT:  Total OUT: 0 mL    Total NET: 720 mL      26 Jun 2019 07:01  -  26 Jun 2019 18:45  --------------------------------------------------------  IN:    IV PiggyBack: 300 mL  Total IN: 300 mL    OUT:  Total OUT: 0 mL    Total NET: 300 mL          Impression: 89y Female admitted with Right wrist pain  Much improved on IV antibiotic      Plan:  -for several weeks of IV antibiotic therapy  -will sign off case, please reconsult as needed

## 2019-06-26 NOTE — PROGRESS NOTE ADULT - ASSESSMENT
89 yo F hx HLD, dementia p/w R wrist pain and swelling.  Now with oligoarticular inflammatory arthritis.     #oligoarticular inflammatory arthritis (wrist and elbow).   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    -Chondrocalcinosis on xray, more than 1 joint involvement, afebrile and normal wbc more suggestive of crystalline process.   -Possible patient has septic joint of wrist with some underlying CPPD in her R wrist and R elbow (especially since R elbow symptoms appeared while on antibiotics)  -No aspiration at this time, will be empirically treated for septic arthritis    - continue abx  - ice to affected joints  - spoke with ID, ok for short steroid taper, please give the patient the following taper starting tomorrow 6/27/19: prednisone 10mg daily x3, prednisone 5mg daily x4      Asim Castaneda MD  Rheumatology Fellow PGY IV 89 yo F hx HLD, dementia p/w R wrist pain and swelling.  Now with oligoarticular inflammatory arthritis.     #oligoarticular inflammatory arthritis (wrist and elbow).   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  Improvement further noted after steroid.   -Chondrocalcinosis on xray, more than 1 joint involvement, afebrile and normal wbc more suggestive of crystalline process.   -Possible patient has septic joint of wrist with some underlying CPPD in her R wrist and R elbow (especially since R elbow symptoms appeared while on antibiotics)  -No aspiration at this time, will be empirically treated for septic arthritis and crystalline.  - continue abx  - ice to affected joints  - spoke with ID, ok for short steroid taper, please give the patient the following taper starting tomorrow 6/27/19: prednisone 10mg daily x3, prednisone 5mg daily x4      Asim Castaneda MD  Rheumatology Fellow PGY IV

## 2019-06-26 NOTE — PROGRESS NOTE ADULT - ASSESSMENT
89 y/o woman w/PMH HLD, mild dementia, R cataract c/b corneal injury, ?prior subclinical L basal ganglia CVA admitted 6/20/19 with R wrist pain and swelling x 2 days,    septic arthritis and/or CDDP crystal arthropathy   Blood Cx negative    Suggest   PICC line  Continue Ceftriaxone 6/22 -->  Continue Vanco 6/21-->  weekly CBC, CMP, Vanco trough while  on IV antibiotics  Complete 4 week course on July 18, 2019    Discussed with Rheum: no objection to short prednisone dose as outlined.

## 2019-06-26 NOTE — PROGRESS NOTE ADULT - PROBLEM SELECTOR PLAN 1
- per ID, Rheum septic arthritis more likely  - ROM is improving on abx  - MRI wrist shows small effusion and significant tenosynovitis  - In view of tenosynovitis  ? OM will need long abx course atleast 4 weeks   - per [facility] joint tap will be of minimal yield as pt is already on abx  - will obtain PICC line for prolonged abx ; dtr Cathryn to give consent  - c/w Ceftriaxone , Vancomycin   - check Vanco trough today

## 2019-06-27 ENCOUNTER — TRANSCRIPTION ENCOUNTER (OUTPATIENT)
Age: 84
End: 2019-06-27

## 2019-06-27 LAB
ANION GAP SERPL CALC-SCNC: 12 MMOL/L — SIGNIFICANT CHANGE UP (ref 5–17)
BUN SERPL-MCNC: 24 MG/DL — HIGH (ref 7–23)
CALCIUM SERPL-MCNC: 8.8 MG/DL — SIGNIFICANT CHANGE UP (ref 8.4–10.5)
CHLORIDE SERPL-SCNC: 94 MMOL/L — LOW (ref 96–108)
CO2 SERPL-SCNC: 27 MMOL/L — SIGNIFICANT CHANGE UP (ref 22–31)
CREAT SERPL-MCNC: 0.48 MG/DL — LOW (ref 0.5–1.3)
GLUCOSE SERPL-MCNC: 88 MG/DL — SIGNIFICANT CHANGE UP (ref 70–99)
HCT VFR BLD CALC: 40.4 % — SIGNIFICANT CHANGE UP (ref 34.5–45)
HGB BLD-MCNC: 13.1 G/DL — SIGNIFICANT CHANGE UP (ref 11.5–15.5)
MAGNESIUM SERPL-MCNC: 1.9 MG/DL — SIGNIFICANT CHANGE UP (ref 1.6–2.6)
MCHC RBC-ENTMCNC: 30.3 PG — SIGNIFICANT CHANGE UP (ref 27–34)
MCHC RBC-ENTMCNC: 32.4 GM/DL — SIGNIFICANT CHANGE UP (ref 32–36)
MCV RBC AUTO: 93.3 FL — SIGNIFICANT CHANGE UP (ref 80–100)
PLATELET # BLD AUTO: 292 K/UL — SIGNIFICANT CHANGE UP (ref 150–400)
POTASSIUM SERPL-MCNC: 4.1 MMOL/L — SIGNIFICANT CHANGE UP (ref 3.5–5.3)
POTASSIUM SERPL-SCNC: 4.1 MMOL/L — SIGNIFICANT CHANGE UP (ref 3.5–5.3)
RBC # BLD: 4.33 M/UL — SIGNIFICANT CHANGE UP (ref 3.8–5.2)
RBC # FLD: 14 % — SIGNIFICANT CHANGE UP (ref 10.3–14.5)
SODIUM SERPL-SCNC: 133 MMOL/L — LOW (ref 135–145)
WBC # BLD: 8.51 K/UL — SIGNIFICANT CHANGE UP (ref 3.8–10.5)
WBC # FLD AUTO: 8.51 K/UL — SIGNIFICANT CHANGE UP (ref 3.8–10.5)

## 2019-06-27 PROCEDURE — 99233 SBSQ HOSP IP/OBS HIGH 50: CPT

## 2019-06-27 PROCEDURE — 99231 SBSQ HOSP IP/OBS SF/LOW 25: CPT

## 2019-06-27 RX ORDER — QUETIAPINE FUMARATE 200 MG/1
12.5 TABLET, FILM COATED ORAL
Qty: 0 | Refills: 0 | DISCHARGE
Start: 2019-06-27

## 2019-06-27 RX ORDER — CEFTRIAXONE 500 MG/1
1 INJECTION, POWDER, FOR SOLUTION INTRAMUSCULAR; INTRAVENOUS
Qty: 20 | Refills: 0
Start: 2019-06-27 | End: 2019-07-16

## 2019-06-27 RX ORDER — VANCOMYCIN HCL 1 G
750 VIAL (EA) INTRAVENOUS
Qty: 40 | Refills: 0
Start: 2019-06-27 | End: 2019-07-16

## 2019-06-27 RX ADMIN — Medication 250 MILLIGRAM(S): at 13:26

## 2019-06-27 RX ADMIN — Medication 1 DROP(S): at 05:25

## 2019-06-27 RX ADMIN — Medication 1 DROP(S): at 17:18

## 2019-06-27 RX ADMIN — Medication 250 MILLIGRAM(S): at 00:15

## 2019-06-27 RX ADMIN — SIMVASTATIN 10 MILLIGRAM(S): 20 TABLET, FILM COATED ORAL at 21:18

## 2019-06-27 RX ADMIN — HEPARIN SODIUM 5000 UNIT(S): 5000 INJECTION INTRAVENOUS; SUBCUTANEOUS at 05:25

## 2019-06-27 RX ADMIN — HEPARIN SODIUM 5000 UNIT(S): 5000 INJECTION INTRAVENOUS; SUBCUTANEOUS at 17:22

## 2019-06-27 RX ADMIN — Medication 10 MILLIGRAM(S): at 17:25

## 2019-06-27 RX ADMIN — CEFTRIAXONE 100 MILLIGRAM(S): 500 INJECTION, POWDER, FOR SOLUTION INTRAMUSCULAR; INTRAVENOUS at 17:21

## 2019-06-27 NOTE — PROGRESS NOTE ADULT - ASSESSMENT
89 yo F hx HLD, dementia p/w R wrist pain and swelling.  Now with oligoarticular inflammatory arthritis.     #oligoarticular inflammatory arthritis (wrist and elbow).   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  Improvement further noted after steroid.   -Chondrocalcinosis on xray, more than 1 joint involvement, afebrile and normal wbc more suggestive of crystalline process.   -Possible patient has septic joint of wrist with some underlying CPPD in her R wrist and R elbow (especially since R elbow symptoms appeared while on antibiotics)  -No aspiration at this time, will be empirically treated for septic arthritis and crystalline.  - continue abx  - ice to affected joints  - continue prednisone taper prednisone 10mg daily x2, prednisone 5mg daily x4    Asim Castaneda MD  Rheumatology Fellow PGY IV 89 yo F hx HLD, dementia p/w R wrist pain and swelling.  Now with oligoarticular inflammatory arthritis.     #oligoarticular inflammatory arthritis (wrist and elbow).   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  Improvement further noted after steroid.   -Chondrocalcinosis on xray, more than 1 joint involvement, afebrile and normal wbc more suggestive of crystalline process.   -Possible patient has septic joint of wrist with some underlying CPPD in her R wrist and R elbow (especially since R elbow symptoms appeared while on antibiotics)  -No aspiration at this time, has responded to treatment for septic arthritis and crystalline (likely pseudogout)  - continue abx and steroids  - ice to affected joints  - continue prednisone taper prednisone 10mg daily x2, prednisone 5mg daily x4    Asim Castaneda MD  Rheumatology Fellow PGY IV

## 2019-06-27 NOTE — PROGRESS NOTE ADULT - PROBLEM SELECTOR PLAN 1
- septic arthritis with some underlying CPPD  - ROM is improving on abx  - per ID to c/w abx until 7/18/19  - per Rheum start short Prednisone course 10mg qd x 3 days, then 5mg qd x 4 days  - s/p PICC line placement  - c/w Ceftriaxone , Vancomycin   - Vanco trough 11.8  - discharge planning to [facility]

## 2019-06-27 NOTE — DISCHARGE NOTE PROVIDER - HOSPITAL COURSE
Rheum:     #oligoarticular inflammatory arthritis (wrist and elbow).   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  Improvement further noted after steroid.     -Chondrocalcinosis on xray, more than 1 joint involvement, afebrile and normal wbc more suggestive of crystalline process.     -Possible patient has septic joint of wrist with some underlying CPPD in her R wrist and R elbow (especially since R elbow symptoms appeared while on antibiotics)    -No aspiration at this time, will be empirically treated for septic arthritis and crystalline.    - continue abx    - c/w short steroid taper: prednisone 10mg daily x3, prednisone 5mg daily x4        ID:     Continue Ceftriaxone 6/22 -->    Continue Vanco 6/21-->    weekly CBC, CMP, Vanco trough while  on IV antibiotics    Complete 4 week course on July 18, 2019 89yo woman w/ PMH HLD, mild dementia, R cataract c/b corneal injury, ?prior subclinical L basal ganglia CVA p/w R wrist pain and swelling x 2 days, concerning for possible wrist infection vs gout flare.        ID:     Continue Ceftriaxone 6/22 -->    Continue Vanco 6/21-->    weekly CBC, CMP, Vanco trough while  on IV antibiotics    Complete 4 week course on July 18, 2019        Rheum:     #oligoarticular inflammatory arthritis (wrist and elbow).   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  Improvement further noted after steroid.     -Chondrocalcinosis on xray, more than 1 joint involvement, afebrile and normal wbc more suggestive of crystalline process.     -Possible patient has septic joint of wrist with some underlying CPPD in her R wrist and R elbow (especially since R elbow symptoms appeared while on antibiotics)    -No aspiration at this time, will be empirically treated for septic arthritis and crystalline.    - continue abx    - c/w short steroid taper: prednisone 10mg daily x3, prednisone 5mg daily x4 89yo woman w/ PMH HLD, mild dementia, R cataract c/b corneal injury, ?prior subclinical L basal ganglia CVA p/w R wrist pain and swelling x 2 days, concerning for possible wrist infection vs gout flare.        ID:     Continue Ceftriaxone 6/22 -->    Continue Vanco 6/21-->    weekly CBC, CMP, Vanco trough while  on IV antibiotics    Complete 4 week course on July 18, 2019        Rheum:     #oligoarticular inflammatory arthritis (wrist and elbow).   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  Improvement further noted after steroid.     -Chondrocalcinosis on xray, more than 1 joint involvement, afebrile and normal wbc more suggestive of crystalline process.     -Possible patient has septic joint of wrist with some underlying CPPD in her R wrist and R elbow (especially since R elbow symptoms appeared while on antibiotics)    -No aspiration at this time, will be empirically treated for septic arthritis and crystalline.    - continue abx    - c/w short steroid taper: prednisone 10mg daily x1more day and prednisone 5mg daily x4(start on 6/30) 89yo woman w/ PMH HLD, mild dementia, R cataract c/b corneal injury, ?prior subclinical L basal ganglia CVA p/w R wrist pain and swelling x 2 days, concerning for possible wrist infection vs gout flare.        ID:     Continue Ceftriaxone 6/22 -->    Continue Vanco 6/21-->    weekly CBC, CMP, Vanco trough while  on IV antibiotics    Complete 4 week course on July 18, 2019        Rheum:     #oligoarticular inflammatory arthritis (wrist and elbow).   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  Improvement further noted after steroid.     -Chondrocalcinosis on xray, more than 1 joint involvement, afebrile and normal wbc more suggestive of crystalline process.     -Possible patient has septic joint of wrist with some underlying CPPD in her R wrist and R elbow (especially since R elbow symptoms appeared while on antibiotics)    -No aspiration at this time, will be empirically treated for septic arthritis and crystalline.    - continue abx    - continue prednisone taper prednisone 10mg daily x2 (6/27-6/30), prednisone 5mg daily x5 (7/1-7/5), then 2.5mg daily (7/6-7/10)

## 2019-06-27 NOTE — DISCHARGE NOTE PROVIDER - CARE PROVIDER_API CALL
Yeison Byrd)  Internal Medicine  [address] [place] [address]  Phone: [phone]  Fax: [phone]  Follow Up Time:     Raj Gonzales)  Medicine  [facility]  [address]  [place] [address]  Phone: [phone]  Fax: [phone]  Follow Up Time:

## 2019-06-27 NOTE — PROGRESS NOTE ADULT - ATTENDING COMMENTS
Called pt's daughter Cathryn to give an update ; left a voicemail message with provider contact information.
Pt's daughter Cathryn Robles updated ( [phone] - cell phone).
Called pt's daughter Cathryn at [phone] to give an update ; unable to reach her. Left a voicemail message.

## 2019-06-27 NOTE — PROGRESS NOTE ADULT - SUBJECTIVE AND OBJECTIVE BOX
PEDRO BYRD  [id number]    INTERVAL HPI/OVERNIGHT EVENTS:    Patient wants to go home. Physical exam continues to improve.    MEDICATIONS  (STANDING):  cefTRIAXone   IVPB 1000 milliGRAM(s) IV Intermittent every 24 hours  heparin  Injectable 5000 Unit(s) SubCutaneous every 12 hours  prednisoLONE acetate 1% Suspension 1 Drop(s) Right EYE every 12 hours  predniSONE   Tablet 10 milliGRAM(s) Oral daily  simvastatin 10 milliGRAM(s) Oral at bedtime  vancomycin  IVPB 750 milliGRAM(s) IV Intermittent every 12 hours    MEDICATIONS  (PRN):  acetaminophen   Tablet .. 650 milliGRAM(s) Oral every 6 hours PRN Mild Pain (1 - 3)  QUEtiapine 12.5 milliGRAM(s) Oral every 12 hours PRN agitation      Allergies    No Known Allergies    Intolerances    penicillin (Stomach Upset)      Review of Systems:   General: No fevers/chills, no fatigue  HEENT: No blurry vision, dysphagia, or odynophagia  CVS: No CP/palpitations  Resp: No SOB/wheezing  GI: No N/V/C/D/abdominal pain  MSK:   Skin: No new rashes  Neuro: No headaches      Vital Signs Last 24 Hrs  T(C): 36.7 (27 Jun 2019 14:19), Max: 36.7 (27 Jun 2019 14:19)  T(F): 98.1 (27 Jun 2019 14:19), Max: 98.1 (27 Jun 2019 14:19)  HR: 72 (27 Jun 2019 14:19) (72 - 80)  BP: 159/75 (27 Jun 2019 14:19) (125/70 - 159/75)  BP(mean): --  RR: 18 (27 Jun 2019 14:19) (18 - 18)  SpO2: 96% (27 Jun 2019 14:19) (95% - 96%)    Physical Exam:  General: NAD - Awake, intermittently agitated  MSK: R wrist with no swelling, edema and erythema.  Able to touch the joint now without discomfort.  R elbow with no effusion, no erythema, cooler today, can extend 15 degrees - all improved  Neuro: alert  Skin: no erythema over the R wrist and elbow      LABS:                        13.1   8.51  )-----------( 292      ( 27 Jun 2019 13:12 )             40.4     06-27    133<L>  |  94<L>  |  24<H>  ----------------------------<  88  4.1   |  27  |  0.48<L>    Ca    8.8      27 Jun 2019 09:07  Mg     1.9     06-27      PT/INR - ( 26 Jun 2019 08:51 )   PT: 16.4 sec;   INR: 1.42 ratio                 RADIOLOGY & ADDITIONAL TESTS:        EXAM:  WRIST COMPLETE RIGHT-MIN 3 VIEWS                            PROCEDURE DATE:  06/20/2019            INTERPRETATION:  CLINICAL INDICATION: Erythema and swelling along the   dorsal wrist. Status post fall.    EXAM: PA, lateral and oblique viewsof the right wrist    COMPARISON: None      IMPRESSION:   The bones are diffusely demineralized. No discrete fracture is   visualized. There is widening of the scapholunate ligament, consistent   with ligamentous insufficiency-prior injury. There is chondrocalcinosis.   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 There is soft tissue   swelling about the carpus.    EXAM:  MR WRIST RT                            PROCEDURE DATE:  06/21/2019            INTERPRETATION:  Clinical Information: Right wrist swelling, low-grade   fever, concern for joint abscess.    Comparison: None.    Technique: MRI of the wrist was performed utilizing multiplanar imaging   without the administration of intravenous contrast. The study was   terminated prematurely as the patient was unable to tolerate the   examination.     Findings:     Evaluation is markedly limited due to an incomplete study, motion   artifact and inhomogeneous fat saturation. There is a partially included   marked flexor digitorum tenosynovitis most prominent at the level of the   distal radius and ulna. There is moderate flexor carpi radialis   tenosynovitis with a suggestion of septations. There is mild abductor   pollicis longus tenosynovitis. There is a small distal radioulnar joint   effusion. There is likely a radiocarpal joint effusion with synovitis   although the radiocarpal region was not included on the sagittal   sequence. There is diffuse soft tissue edema and subcutaneous edema.   There is a suggestion of hypointense marrow signal within the scaphoid on   the T1-weighted sequence and there are rounded foci of marrow signal   abnormality throughout the visualized osseous structures which are not   well evaluated on this incomplete study.    Impression:   Markedly limited incomplete evaluation.  Partially included marked flexor digitorum tenosynovitis, moderate flexor   carpi radialis tenosynovitis and mild abductor pollicis longus   tenosynovitis. Small distal radioulnar joint effusion and radiocarpal   joint effusion with synovitis. Diffuse soft tissue edema and subcutaneous   edema. These findings are likely infectious given the clinical history.   Nonspecific marrow signal abnormality which is incompletely evaluated on   this study; osteomyelitis remains in the differential. PEDRO BYRD  [id number]    INTERVAL HPI/OVERNIGHT EVENTS:    Patient wants to go home. Physical exam continues to improve.  denies new joint pain    MEDICATIONS  (STANDING):  cefTRIAXone   IVPB 1000 milliGRAM(s) IV Intermittent every 24 hours  heparin  Injectable 5000 Unit(s) SubCutaneous every 12 hours  prednisoLONE acetate 1% Suspension 1 Drop(s) Right EYE every 12 hours  predniSONE   Tablet 10 milliGRAM(s) Oral daily  simvastatin 10 milliGRAM(s) Oral at bedtime  vancomycin  IVPB 750 milliGRAM(s) IV Intermittent every 12 hours    MEDICATIONS  (PRN):  acetaminophen   Tablet .. 650 milliGRAM(s) Oral every 6 hours PRN Mild Pain (1 - 3)  QUEtiapine 12.5 milliGRAM(s) Oral every 12 hours PRN agitation      Allergies    No Known Allergies    Intolerances    penicillin (Stomach Upset)      Review of Systems:   General: No fevers/chills, no fatigue  HEENT: No blurry vision, dysphagia, or odynophagia  CVS: No CP/palpitations  Resp: No SOB/wheezing  GI: No N/V/C/D/abdominal pain  MSK:   Skin: No new rashes  Neuro: No headaches      Vital Signs Last 24 Hrs  T(C): 36.7 (27 Jun 2019 14:19), Max: 36.7 (27 Jun 2019 14:19)  T(F): 98.1 (27 Jun 2019 14:19), Max: 98.1 (27 Jun 2019 14:19)  HR: 72 (27 Jun 2019 14:19) (72 - 80)  BP: 159/75 (27 Jun 2019 14:19) (125/70 - 159/75)  BP(mean): --  RR: 18 (27 Jun 2019 14:19) (18 - 18)  SpO2: 96% (27 Jun 2019 14:19) (95% - 96%)    Physical Exam:  General: NAD - Awake, intermittently agitated  MSK: R wrist with no swelling, edema and erythema.  Able to touch the joint now without discomfort.  R elbow with no effusion, no erythema, cooler today, can extend 15 degrees - all improved  Neuro: alert  Skin: no erythema over the R wrist and elbow      LABS:                        13.1   8.51  )-----------( 292      ( 27 Jun 2019 13:12 )             40.4     06-27    133<L>  |  94<L>  |  24<H>  ----------------------------<  88  4.1   |  27  |  0.48<L>    Ca    8.8      27 Jun 2019 09:07  Mg     1.9     06-27      PT/INR - ( 26 Jun 2019 08:51 )   PT: 16.4 sec;   INR: 1.42 ratio                 RADIOLOGY & ADDITIONAL TESTS:        EXAM:  WRIST COMPLETE RIGHT-MIN 3 VIEWS                            PROCEDURE DATE:  06/20/2019            INTERPRETATION:  CLINICAL INDICATION: Erythema and swelling along the   dorsal wrist. Status post fall.    EXAM: PA, lateral and oblique viewsof the right wrist    COMPARISON: None      IMPRESSION:   The bones are diffusely demineralized. No discrete fracture is   visualized. There is widening of the scapholunate ligament, consistent   with ligamentous insufficiency-prior injury. There is chondrocalcinosis.   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 There is soft tissue   swelling about the carpus.    EXAM:  MR WRIST RT                            PROCEDURE DATE:  06/21/2019            INTERPRETATION:  Clinical Information: Right wrist swelling, low-grade   fever, concern for joint abscess.    Comparison: None.    Technique: MRI of the wrist was performed utilizing multiplanar imaging   without the administration of intravenous contrast. The study was   terminated prematurely as the patient was unable to tolerate the   examination.     Findings:     Evaluation is markedly limited due to an incomplete study, motion   artifact and inhomogeneous fat saturation. There is a partially included   marked flexor digitorum tenosynovitis most prominent at the level of the   distal radius and ulna. There is moderate flexor carpi radialis   tenosynovitis with a suggestion of septations. There is mild abductor   pollicis longus tenosynovitis. There is a small distal radioulnar joint   effusion. There is likely a radiocarpal joint effusion with synovitis   although the radiocarpal region was not included on the sagittal   sequence. There is diffuse soft tissue edema and subcutaneous edema.   There is a suggestion of hypointense marrow signal within the scaphoid on   the T1-weighted sequence and there are rounded foci of marrow signal   abnormality throughout the visualized osseous structures which are not   well evaluated on this incomplete study.    Impression:   Markedly limited incomplete evaluation.  Partially included marked flexor digitorum tenosynovitis, moderate flexor   carpi radialis tenosynovitis and mild abductor pollicis longus   tenosynovitis. Small distal radioulnar joint effusion and radiocarpal   joint effusion with synovitis. Diffuse soft tissue edema and subcutaneous   edema. These findings are likely infectious given the clinical history.   Nonspecific marrow signal abnormality which is incompletely evaluated on   this study; osteomyelitis remains in the differential.

## 2019-06-27 NOTE — DISCHARGE NOTE PROVIDER - NSDCCPCAREPLAN_GEN_ALL_CORE_FT
PRINCIPAL DISCHARGE DIAGNOSIS  Diagnosis: Wrist pain, acute, right  Assessment and Plan of Treatment: Continue with Antibiotics via Left Picc until 7/18/19  Continue with Prednisone taper      SECONDARY DISCHARGE DIAGNOSES  Diagnosis: Delirium  Assessment and Plan of Treatment: History of Dementia.   Continue with Seroquel 12.5mg PO q 12 prn for agitation

## 2019-06-27 NOTE — PROGRESS NOTE ADULT - PROBLEM SELECTOR PLAN 2
- Pt with h/o dementia   -  Pt now with delirium in setting of multiple factors (hospital admission,pain, infection) ; much improved  - c/w Tylenol 650mg PO Q6H PRN Pain   -c/w Seroquel 12.5mg PO q 12 prn for agitation  -Frequent re-orientation

## 2019-06-27 NOTE — PROGRESS NOTE ADULT - SUBJECTIVE AND OBJECTIVE BOX
Patient is a 89y old  Female who presents with a chief complaint of r/o R wrist infection (26 Jun 2019 19:00)      SUBJECTIVE / OVERNIGHT EVENTS:    MEDICATIONS  (STANDING):  cefTRIAXone   IVPB 1000 milliGRAM(s) IV Intermittent every 24 hours  heparin  Injectable 5000 Unit(s) SubCutaneous every 12 hours  prednisoLONE acetate 1% Suspension 1 Drop(s) Right EYE every 12 hours  simvastatin 10 milliGRAM(s) Oral at bedtime  vancomycin  IVPB 750 milliGRAM(s) IV Intermittent every 12 hours    MEDICATIONS  (PRN):  acetaminophen   Tablet .. 650 milliGRAM(s) Oral every 6 hours PRN Mild Pain (1 - 3)  QUEtiapine 12.5 milliGRAM(s) Oral every 12 hours PRN agitation      Vital Signs Last 24 Hrs  T(C): 36.3 (27 Jun 2019 04:06), Max: 36.6 (26 Jun 2019 13:15)  T(F): 97.3 (27 Jun 2019 04:06), Max: 97.9 (26 Jun 2019 19:29)  HR: 77 (27 Jun 2019 04:06) (77 - 82)  BP: 155/74 (27 Jun 2019 04:06) (125/70 - 155/74)  BP(mean): --  RR: 18 (27 Jun 2019 04:06) (18 - 18)  SpO2: 95% (27 Jun 2019 04:06) (94% - 95%)  CAPILLARY BLOOD GLUCOSE        I&O's Summary    26 Jun 2019 07:01  -  27 Jun 2019 07:00  --------------------------------------------------------  IN: 1020 mL / OUT: 0 mL / NET: 1020 mL        PHYSICAL EXAM:  GENERAL: NAD, well-developed  HEAD:  Atraumatic, Normocephalic  EYES: EOMI, PERRLA, conjunctiva and sclera clear  NECK: Supple, No JVD  CHEST/LUNG: Clear to auscultation bilaterally; No wheeze  HEART: Regular rate and rhythm; No murmurs, rubs, or gallops  ABDOMEN: Soft, Nontender, Nondistended; Bowel sounds present  EXTREMITIES:  2+ Peripheral Pulses, No clubbing, cyanosis, or edema  PSYCH: AAOx3  NEUROLOGY: non-focal  SKIN: No rashes or lesions    LABS:    06-27    133<L>  |  94<L>  |  24<H>  ----------------------------<  88  4.1   |  27  |  0.48<L>    Ca    8.8      27 Jun 2019 09:07  Mg     1.9     06-27      PT/INR - ( 26 Jun 2019 08:51 )   PT: 16.4 sec;   INR: 1.42 ratio                   RADIOLOGY & ADDITIONAL TESTS:    Imaging Personally Reviewed:    Consultant(s) Notes Reviewed:      Care Discussed with Consultants/Other Providers: Patient is a 89y old  Female who presents with a chief complaint of r/o R wrist infection (26 Jun 2019 19:00)      SUBJECTIVE / OVERNIGHT EVENTS:  Pt seen and examined. No acute events overnight. She denies right UE pain. s/p PICC line placement yesterday.    MEDICATIONS  (STANDING):  cefTRIAXone   IVPB 1000 milliGRAM(s) IV Intermittent every 24 hours  heparin  Injectable 5000 Unit(s) SubCutaneous every 12 hours  prednisoLONE acetate 1% Suspension 1 Drop(s) Right EYE every 12 hours  simvastatin 10 milliGRAM(s) Oral at bedtime  vancomycin  IVPB 750 milliGRAM(s) IV Intermittent every 12 hours    MEDICATIONS  (PRN):  acetaminophen   Tablet .. 650 milliGRAM(s) Oral every 6 hours PRN Mild Pain (1 - 3)  QUEtiapine 12.5 milliGRAM(s) Oral every 12 hours PRN agitation      Vital Signs Last 24 Hrs  T(C): 36.3 (27 Jun 2019 04:06), Max: 36.6 (26 Jun 2019 13:15)  T(F): 97.3 (27 Jun 2019 04:06), Max: 97.9 (26 Jun 2019 19:29)  HR: 77 (27 Jun 2019 04:06) (77 - 82)  BP: 155/74 (27 Jun 2019 04:06) (125/70 - 155/74)  BP(mean): --  RR: 18 (27 Jun 2019 04:06) (18 - 18)  SpO2: 95% (27 Jun 2019 04:06) (94% - 95%)  CAPILLARY BLOOD GLUCOSE        I&O's Summary    26 Jun 2019 07:01  -  27 Jun 2019 07:00  --------------------------------------------------------  IN: 1020 mL / OUT: 0 mL / NET: 1020 mL        PHYSICAL EXAM:  GENERAL: NAD, cachetic, frail  NECK: Supple, No JVD  CHEST: Clear to auscultation bilaterally; No wheeze  HEART: +S1/S2, reg   ABDOMEN: Soft, Nontender, Nondistended; Bowel sounds present  EXTREMITIES:  R wrist with much improved swelling and erythema ; + R elbow effusion, less erythema  PSYCH: Alert and awake, oriented to self only    LABS:    06-27    133<L>  |  94<L>  |  24<H>  ----------------------------<  88  4.1   |  27  |  0.48<L>    Ca    8.8      27 Jun 2019 09:07  Mg     1.9     06-27      PT/INR - ( 26 Jun 2019 08:51 )   PT: 16.4 sec;   INR: 1.42 ratio                     Consultant(s) Notes Reviewed:  ID, Rheum

## 2019-06-27 NOTE — DISCHARGE NOTE PROVIDER - NSDCFUADDINST_GEN_ALL_CORE_FT
Follow up with Infectious disease  Follow up with your Primary care doctor  weekly CBC, CMP, Vanco trough while  on IV antibiotics

## 2019-06-28 ENCOUNTER — TRANSCRIPTION ENCOUNTER (OUTPATIENT)
Age: 84
End: 2019-06-28

## 2019-06-28 VITALS
HEART RATE: 76 BPM | RESPIRATION RATE: 17 BRPM | SYSTOLIC BLOOD PRESSURE: 118 MMHG | DIASTOLIC BLOOD PRESSURE: 68 MMHG | TEMPERATURE: 97 F | OXYGEN SATURATION: 96 %

## 2019-06-28 LAB — VANCOMYCIN TROUGH SERPL-MCNC: 23.7 UG/ML — HIGH (ref 10–20)

## 2019-06-28 PROCEDURE — 85652 RBC SED RATE AUTOMATED: CPT

## 2019-06-28 PROCEDURE — 73221 MRI JOINT UPR EXTREM W/O DYE: CPT

## 2019-06-28 PROCEDURE — 99285 EMERGENCY DEPT VISIT HI MDM: CPT | Mod: 25

## 2019-06-28 PROCEDURE — 72170 X-RAY EXAM OF PELVIS: CPT

## 2019-06-28 PROCEDURE — 96365 THER/PROPH/DIAG IV INF INIT: CPT

## 2019-06-28 PROCEDURE — 82803 BLOOD GASES ANY COMBINATION: CPT

## 2019-06-28 PROCEDURE — 87086 URINE CULTURE/COLONY COUNT: CPT

## 2019-06-28 PROCEDURE — 83735 ASSAY OF MAGNESIUM: CPT

## 2019-06-28 PROCEDURE — 70450 CT HEAD/BRAIN W/O DYE: CPT

## 2019-06-28 PROCEDURE — 82330 ASSAY OF CALCIUM: CPT

## 2019-06-28 PROCEDURE — 80048 BASIC METABOLIC PNL TOTAL CA: CPT

## 2019-06-28 PROCEDURE — 73080 X-RAY EXAM OF ELBOW: CPT

## 2019-06-28 PROCEDURE — 87040 BLOOD CULTURE FOR BACTERIA: CPT

## 2019-06-28 PROCEDURE — 71045 X-RAY EXAM CHEST 1 VIEW: CPT

## 2019-06-28 PROCEDURE — 84295 ASSAY OF SERUM SODIUM: CPT

## 2019-06-28 PROCEDURE — 80202 ASSAY OF VANCOMYCIN: CPT

## 2019-06-28 PROCEDURE — 97110 THERAPEUTIC EXERCISES: CPT

## 2019-06-28 PROCEDURE — 85610 PROTHROMBIN TIME: CPT

## 2019-06-28 PROCEDURE — 82435 ASSAY OF BLOOD CHLORIDE: CPT

## 2019-06-28 PROCEDURE — 82947 ASSAY GLUCOSE BLOOD QUANT: CPT

## 2019-06-28 PROCEDURE — 84145 PROCALCITONIN (PCT): CPT

## 2019-06-28 PROCEDURE — 84132 ASSAY OF SERUM POTASSIUM: CPT

## 2019-06-28 PROCEDURE — 85730 THROMBOPLASTIN TIME PARTIAL: CPT

## 2019-06-28 PROCEDURE — 84100 ASSAY OF PHOSPHORUS: CPT

## 2019-06-28 PROCEDURE — 73110 X-RAY EXAM OF WRIST: CPT

## 2019-06-28 PROCEDURE — 85027 COMPLETE CBC AUTOMATED: CPT

## 2019-06-28 PROCEDURE — 93005 ELECTROCARDIOGRAM TRACING: CPT

## 2019-06-28 PROCEDURE — 97165 OT EVAL LOW COMPLEX 30 MIN: CPT

## 2019-06-28 PROCEDURE — 86140 C-REACTIVE PROTEIN: CPT

## 2019-06-28 PROCEDURE — 99239 HOSP IP/OBS DSCHRG MGMT >30: CPT

## 2019-06-28 PROCEDURE — 85014 HEMATOCRIT: CPT

## 2019-06-28 PROCEDURE — 97162 PT EVAL MOD COMPLEX 30 MIN: CPT

## 2019-06-28 PROCEDURE — 99231 SBSQ HOSP IP/OBS SF/LOW 25: CPT

## 2019-06-28 PROCEDURE — C1751: CPT

## 2019-06-28 PROCEDURE — 81001 URINALYSIS AUTO W/SCOPE: CPT

## 2019-06-28 PROCEDURE — 83605 ASSAY OF LACTIC ACID: CPT

## 2019-06-28 PROCEDURE — 80053 COMPREHEN METABOLIC PANEL: CPT

## 2019-06-28 PROCEDURE — 36569 INSJ PICC 5 YR+ W/O IMAGING: CPT

## 2019-06-28 RX ADMIN — Medication 1 DROP(S): at 05:11

## 2019-06-28 RX ADMIN — Medication 250 MILLIGRAM(S): at 12:13

## 2019-06-28 RX ADMIN — HEPARIN SODIUM 5000 UNIT(S): 5000 INJECTION INTRAVENOUS; SUBCUTANEOUS at 05:11

## 2019-06-28 RX ADMIN — Medication 10 MILLIGRAM(S): at 05:11

## 2019-06-28 RX ADMIN — CEFTRIAXONE 100 MILLIGRAM(S): 500 INJECTION, POWDER, FOR SOLUTION INTRAMUSCULAR; INTRAVENOUS at 13:00

## 2019-06-28 RX ADMIN — Medication 250 MILLIGRAM(S): at 00:59

## 2019-06-28 NOTE — PROGRESS NOTE ADULT - PROBLEM SELECTOR PROBLEM 1
Wrist swelling, right

## 2019-06-28 NOTE — PROGRESS NOTE ADULT - SUBJECTIVE AND OBJECTIVE BOX
PEDRO BYRD  [id number]    INTERVAL HPI/OVERNIGHT EVENTS:  Pt continues to improve. She reports significant weakness in the right hand stating "I cant use it for anything," but is using her right hand to eat during interview. She endorses mild right wrist pain on ROM.       MEDICATIONS  (STANDING):  cefTRIAXone   IVPB 1000 milliGRAM(s) IV Intermittent every 24 hours  heparin  Injectable 5000 Unit(s) SubCutaneous every 12 hours  prednisoLONE acetate 1% Suspension 1 Drop(s) Right EYE every 12 hours  predniSONE   Tablet 10 milliGRAM(s) Oral daily  simvastatin 10 milliGRAM(s) Oral at bedtime  vancomycin  IVPB 750 milliGRAM(s) IV Intermittent every 12 hours    MEDICATIONS  (PRN):  acetaminophen   Tablet .. 650 milliGRAM(s) Oral every 6 hours PRN Mild Pain (1 - 3)  QUEtiapine 12.5 milliGRAM(s) Oral every 12 hours PRN agitation      Allergies    No Known Allergies    Intolerances    penicillin (Stomach Upset)      Review of Systems:   General: No fevers/chills, no fatigue  HEENT: No blurry vision, dysphagia, or odynophagia  CVS: No CP/palpitations  Resp: No SOB/wheezing  GI: No N/V/C/D/abdominal pain  MSK: +Right hand weakness  Skin: No new rashes  Neuro: No headaches.       Vital Signs Last 24 Hrs  T(C): 36.4 (28 Jun 2019 04:20), Max: 37.1 (27 Jun 2019 19:44)  T(F): 97.5 (28 Jun 2019 04:20), Max: 98.7 (27 Jun 2019 19:44)  HR: 70 (28 Jun 2019 04:20) (70 - 79)  BP: 159/80 (28 Jun 2019 04:20) (157/79 - 160/70)  BP(mean): --  RR: 18 (28 Jun 2019 04:20) (18 - 18)  SpO2: 96% (28 Jun 2019 04:20) (96% - 96%)    Physical Exam:  General: NAD, resting comfortably in chair  MSK: mild medial R wrist swelling, no erythema.  Able to touch the joint and ROM without discomfort.  R elbow with no effusion, no erythema, cooler - all improved  Neuro: AAOx1 (Tuesday, May, I don't know the year," "[facility]," name)  Skin: no erythema over the R wrist and elbow  Psych: agitated     LABS:                        13.1   8.51  )-----------( 292      ( 27 Jun 2019 13:12 )             40.4     06-27    133<L>  |  94<L>  |  24<H>  ----------------------------<  88  4.1   |  27  |  0.48<L>    Ca    8.8      27 Jun 2019 09:07  Mg     1.9     06-27              RADIOLOGY & ADDITIONAL TESTS: PEDRO BYRD  [id number]    INTERVAL HPI/OVERNIGHT EVENTS:  Pt continues to improve. She reports significant weakness in the right hand stating "I cant use it for anything," but is using her right hand to eat during interview. She endorses mild right wrist pain on ROM.   Denies new pain and anxious to get out of the hospital      MEDICATIONS  (STANDING):  cefTRIAXone   IVPB 1000 milliGRAM(s) IV Intermittent every 24 hours  heparin  Injectable 5000 Unit(s) SubCutaneous every 12 hours  prednisoLONE acetate 1% Suspension 1 Drop(s) Right EYE every 12 hours  predniSONE   Tablet 10 milliGRAM(s) Oral daily  simvastatin 10 milliGRAM(s) Oral at bedtime  vancomycin  IVPB 750 milliGRAM(s) IV Intermittent every 12 hours    MEDICATIONS  (PRN):  acetaminophen   Tablet .. 650 milliGRAM(s) Oral every 6 hours PRN Mild Pain (1 - 3)  QUEtiapine 12.5 milliGRAM(s) Oral every 12 hours PRN agitation      Allergies    No Known Allergies    Intolerances    penicillin (Stomach Upset)      Review of Systems:   General: No fevers/chills, no fatigue  HEENT: No blurry vision, dysphagia, or odynophagia  CVS: No CP/palpitations  Resp: No SOB/wheezing  GI: No N/V/C/D/abdominal pain  MSK: +Right hand weakness  Skin: No new rashes  Neuro: No headaches.       Vital Signs Last 24 Hrs  T(C): 36.4 (28 Jun 2019 04:20), Max: 37.1 (27 Jun 2019 19:44)  T(F): 97.5 (28 Jun 2019 04:20), Max: 98.7 (27 Jun 2019 19:44)  HR: 70 (28 Jun 2019 04:20) (70 - 79)  BP: 159/80 (28 Jun 2019 04:20) (157/79 - 160/70)  BP(mean): --  RR: 18 (28 Jun 2019 04:20) (18 - 18)  SpO2: 96% (28 Jun 2019 04:20) (96% - 96%)    Physical Exam:  General: NAD, resting comfortably in chair  MSK: mild medial R wrist swelling, no erythema.  Able to touch the joint and ROM without discomfort.  R elbow with no effusion, no erythema, cooler - all improved  Neuro: AAOx1 (Tuesday, May, I don't know the year," "[facility]," name)  Skin: no erythema over the R wrist and elbow  Psych: agitated     LABS:                        13.1   8.51  )-----------( 292      ( 27 Jun 2019 13:12 )             40.4     06-27    133<L>  |  94<L>  |  24<H>  ----------------------------<  88  4.1   |  27  |  0.48<L>    Ca    8.8      27 Jun 2019 09:07  Mg     1.9     06-27              RADIOLOGY & ADDITIONAL TESTS:

## 2019-06-28 NOTE — PROGRESS NOTE ADULT - PROBLEM SELECTOR PLAN 3
c/w home simvastatin 10 mg qhs

## 2019-06-28 NOTE — PROGRESS NOTE ADULT - ASSESSMENT
89 yo F hx HLD, dementia p/w R wrist pain and swelling.  Now with oligoarticular inflammatory arthritis.     #oligoarticular inflammatory arthritis (wrist and elbow).   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  Improvement further noted after steroid.   -Chondrocalcinosis on xray, more than 1 joint involvement, afebrile and normal wbc more suggestive of crystalline process.   -Possible patient has septic joint of wrist with some underlying CPPD in her R wrist and R elbow (especially since R elbow symptoms appeared while on antibiotics)  -No aspiration at this time, has responded to treatment for septic arthritis and crystalline (likely pseudogout)  - continue abx and steroids  - ice to affected joints  - continue prednisone taper prednisone 10mg daily x2 (6/27-6/30), prednisone 5mg daily x4 (7/1-7/4) 89 yo F hx HLD, dementia p/w R wrist pain and swelling.  Now with oligoarticular inflammatory arthritis.     #oligoarticular inflammatory arthritis (wrist and elbow).   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  Improvement further noted after steroid.   -Chondrocalcinosis on xray, more than 1 joint involvement, afebrile and normal wbc more suggestive of crystalline process.   -Possible patient has septic joint of wrist with some underlying CPPD in her R wrist and R elbow (especially since R elbow symptoms appeared while on antibiotics)  -No aspiration at this time, has responded to treatment for septic arthritis and crystalline (likely pseudogout)  - continue abx and steroids  - ice to affected joints  - continue prednisone taper prednisone 10mg daily x2 (6/27-6/30), prednisone 5mg daily x5 (7/1-7/5), then 2.5mg daily (7/6-7/10) 89 yo F hx HLD, dementia p/w R wrist pain and swelling.  Now with oligoarticular inflammatory arthritis.      #oligoarticular inflammatory arthritis (wrist and elbow).   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  Was unable to perform IR directed wrist arthrocentesis due to small amount of fluid noted  - Chondrocalcinosis on xray, more than 1 joint involvement, afebrile and normal wbc more suggestive of crystalline process (pseudogout)  - Treated empirically with ABX with improvement in wrist  - also treated with steroid with further improvement in both joints.   -  ABx per ID   - ice to affected joints  - continue prednisone taper prednisone 10mg daily x2 (6/27-6/30), prednisone 5mg daily x5 (7/1-7/5), then 2.5mg daily (7/6-7/10)

## 2019-06-28 NOTE — PROGRESS NOTE ADULT - PROBLEM SELECTOR PLAN 5
DVT PPX: HSQ 5000 U q12h  PT consult
DVT PPX: HSQ 5000 U q12h  PT reccs [facility]
DVT PPX: HSQ 5000 U q12h  PT reccs [facility]
DVT PPX: HSQ 5000 U q12h  Discharge planning to [facility]
DVT PPX: HSQ 5000 U q12h  Discharge planning to [facility]
DVT PPX: HSQ 5000 U q12h  PT reccs [facility]

## 2019-06-28 NOTE — PROGRESS NOTE ADULT - PROBLEM SELECTOR PLAN 4
c/w home prednisolone eyedrops q12h

## 2019-06-28 NOTE — PROGRESS NOTE ADULT - PROBLEM SELECTOR PLAN 1
- septic arthritis with some underlying CPPD  - ROM is improving on abx  - per ID to c/w abx until 7/18/19  - c/w short Prednisone course 10mg qd x 3 days, then 5mg qd x 4 days per Rheum reccs  - s/p PICC line placement  - c/w Ceftriaxone , Vancomycin   - Vanco trough 11.8  - for discharge to [facility]

## 2019-06-28 NOTE — PROGRESS NOTE ADULT - REASON FOR ADMISSION
r/o R wrist infection

## 2019-06-28 NOTE — DISCHARGE NOTE NURSING/CASE MANAGEMENT/SOCIAL WORK - NSDCDPATPORTLINK_GEN_ALL_CORE
You can access the [facility], offered by [facility], by registering with the following website: [email or web address]

## 2019-06-28 NOTE — PROGRESS NOTE ADULT - SUBJECTIVE AND OBJECTIVE BOX
Patient is a 89y old  Female who presents with a chief complaint of r/o R wrist infection (28 Jun 2019 09:27)      SUBJECTIVE / OVERNIGHT EVENTS:    MEDICATIONS  (STANDING):  cefTRIAXone   IVPB 1000 milliGRAM(s) IV Intermittent every 24 hours  heparin  Injectable 5000 Unit(s) SubCutaneous every 12 hours  prednisoLONE acetate 1% Suspension 1 Drop(s) Right EYE every 12 hours  predniSONE   Tablet 10 milliGRAM(s) Oral daily  simvastatin 10 milliGRAM(s) Oral at bedtime  vancomycin  IVPB 750 milliGRAM(s) IV Intermittent every 12 hours    MEDICATIONS  (PRN):  acetaminophen   Tablet .. 650 milliGRAM(s) Oral every 6 hours PRN Mild Pain (1 - 3)  QUEtiapine 12.5 milliGRAM(s) Oral every 12 hours PRN agitation      Vital Signs Last 24 Hrs  T(C): 36.4 (28 Jun 2019 04:20), Max: 37.1 (27 Jun 2019 19:44)  T(F): 97.5 (28 Jun 2019 04:20), Max: 98.7 (27 Jun 2019 19:44)  HR: 70 (28 Jun 2019 04:20) (70 - 79)  BP: 159/80 (28 Jun 2019 04:20) (157/79 - 160/70)  BP(mean): --  RR: 18 (28 Jun 2019 04:20) (18 - 18)  SpO2: 96% (28 Jun 2019 04:20) (96% - 96%)  CAPILLARY BLOOD GLUCOSE        I&O's Summary    27 Jun 2019 07:01  -  28 Jun 2019 07:00  --------------------------------------------------------  IN: 2110 mL / OUT: 300 mL / NET: 1810 mL        PHYSICAL EXAM:  GENERAL: NAD, well-developed  HEAD:  Atraumatic, Normocephalic  EYES: EOMI, PERRLA, conjunctiva and sclera clear  NECK: Supple, No JVD  CHEST/LUNG: Clear to auscultation bilaterally; No wheeze  HEART: Regular rate and rhythm; No murmurs, rubs, or gallops  ABDOMEN: Soft, Nontender, Nondistended; Bowel sounds present  EXTREMITIES:  2+ Peripheral Pulses, No clubbing, cyanosis, or edema  PSYCH: AAOx3  NEUROLOGY: non-focal  SKIN: No rashes or lesions    LABS:                        13.1   8.51  )-----------( 292      ( 27 Jun 2019 13:12 )             40.4     06-27    133<L>  |  94<L>  |  24<H>  ----------------------------<  88  4.1   |  27  |  0.48<L>    Ca    8.8      27 Jun 2019 09:07  Mg     1.9     06-27                RADIOLOGY & ADDITIONAL TESTS:    Imaging Personally Reviewed:    Consultant(s) Notes Reviewed:      Care Discussed with Consultants/Other Providers: Patient is a 89y old  Female who presents with a chief complaint of r/o R wrist infection (28 Jun 2019 09:27)      SUBJECTIVE / OVERNIGHT EVENTS: Pt seen and examined. No acute events overnight. Denies right wrist pain.    MEDICATIONS  (STANDING):  cefTRIAXone   IVPB 1000 milliGRAM(s) IV Intermittent every 24 hours  heparin  Injectable 5000 Unit(s) SubCutaneous every 12 hours  prednisoLONE acetate 1% Suspension 1 Drop(s) Right EYE every 12 hours  predniSONE   Tablet 10 milliGRAM(s) Oral daily  simvastatin 10 milliGRAM(s) Oral at bedtime  vancomycin  IVPB 750 milliGRAM(s) IV Intermittent every 12 hours    MEDICATIONS  (PRN):  acetaminophen   Tablet .. 650 milliGRAM(s) Oral every 6 hours PRN Mild Pain (1 - 3)  QUEtiapine 12.5 milliGRAM(s) Oral every 12 hours PRN agitation      Vital Signs Last 24 Hrs  T(C): 36.4 (28 Jun 2019 04:20), Max: 37.1 (27 Jun 2019 19:44)  T(F): 97.5 (28 Jun 2019 04:20), Max: 98.7 (27 Jun 2019 19:44)  HR: 70 (28 Jun 2019 04:20) (70 - 79)  BP: 159/80 (28 Jun 2019 04:20) (157/79 - 160/70)  BP(mean): --  RR: 18 (28 Jun 2019 04:20) (18 - 18)  SpO2: 96% (28 Jun 2019 04:20) (96% - 96%)  CAPILLARY BLOOD GLUCOSE        I&O's Summary    27 Jun 2019 07:01  -  28 Jun 2019 07:00  --------------------------------------------------------  IN: 2110 mL / OUT: 300 mL / NET: 1810 mL        PHYSICAL EXAM:  GENERAL: NAD, cachetic, frail  NECK: Supple, No JVD  CHEST: Clear to auscultation bilaterally; No wheeze  HEART: +S1/S2, reg   ABDOMEN: Soft, Nontender, Nondistended; Bowel sounds present  EXTREMITIES:  R wrist with much improved swelling and erythema ; + R elbow effusion, less erythema  PSYCH: Alert and awake, oriented to self only    LABS:                        13.1   8.51  )-----------( 292      ( 27 Jun 2019 13:12 )             40.4     06-27    133<L>  |  94<L>  |  24<H>  ----------------------------<  88  4.1   |  27  |  0.48<L>    Ca    8.8      27 Jun 2019 09:07  Mg     1.9     06-27

## 2019-06-28 NOTE — PROGRESS NOTE ADULT - PROVIDER SPECIALTY LIST ADULT
Hospitalist
Infectious Disease
Plastic Surgery
Rheumatology
Infectious Disease
Rheumatology
Geriatrics

## 2019-07-01 NOTE — DISCUSSION/SUMMARY
[Home] : patient was discharged to home [FreeTextEntry1] : Multiple attempts made to reach pt, mailed out post hospital f/u letter to address on file. PCP notified.

## 2019-07-31 ENCOUNTER — EMERGENCY (EMERGENCY)
Facility: HOSPITAL | Age: 84
LOS: 1 days | Discharge: HOME | End: 2019-07-31
Attending: STUDENT IN AN ORGANIZED HEALTH CARE EDUCATION/TRAINING PROGRAM
Payer: MEDICARE

## 2019-07-31 VITALS
SYSTOLIC BLOOD PRESSURE: 155 MMHG | RESPIRATION RATE: 16 BRPM | TEMPERATURE: 98 F | HEART RATE: 87 BPM | DIASTOLIC BLOOD PRESSURE: 72 MMHG | OXYGEN SATURATION: 99 %

## 2019-07-31 PROCEDURE — 99284 EMERGENCY DEPT VISIT MOD MDM: CPT | Mod: 25

## 2019-07-31 PROCEDURE — 71045 X-RAY EXAM CHEST 1 VIEW: CPT

## 2019-07-31 PROCEDURE — 99284 EMERGENCY DEPT VISIT MOD MDM: CPT | Mod: GC

## 2019-07-31 PROCEDURE — 72170 X-RAY EXAM OF PELVIS: CPT

## 2019-07-31 PROCEDURE — 70450 CT HEAD/BRAIN W/O DYE: CPT

## 2019-07-31 PROCEDURE — 72125 CT NECK SPINE W/O DYE: CPT

## 2019-07-31 NOTE — ED ADULT NURSE NOTE - OBJECTIVE STATEMENT
pt brought in from home by ems s/p fall; daughter at bedside; states pt ambulated to br with walker; left outside of br and fell while trying to sit on toilet; daughter states pt was face down, against the bathtub; denies use of anticoagulants; was not able to get up; called 911; pt was in rehab x 1 month for iv abt for hand cellulitis; was dc yesterday; now pt oriented to name and place only; denies pain

## 2019-07-31 NOTE — ED ADULT NURSE NOTE - NSIMPLEMENTINTERV_GEN_ALL_ED
Implemented All Fall with Harm Risk Interventions:  [place] to call system. Call bell, personal items and telephone within reach. Instruct patient to call for assistance. Room bathroom lighting operational. Non-slip footwear when patient is off stretcher. Physically safe environment: no spills, clutter or unnecessary equipment. Stretcher in lowest position, wheels locked, appropriate side rails in place. Provide visual cue, wrist band, yellow gown, etc. Monitor gait and stability. Monitor for mental status changes and reorient to person, place, and time. Review medications for side effects contributing to fall risk. Reinforce activity limits and safety measures with patient and family. Provide visual clues: red socks.

## 2019-08-01 VITALS
TEMPERATURE: 98 F | HEART RATE: 92 BPM | RESPIRATION RATE: 18 BRPM | OXYGEN SATURATION: 95 % | DIASTOLIC BLOOD PRESSURE: 81 MMHG | SYSTOLIC BLOOD PRESSURE: 173 MMHG

## 2019-08-01 PROCEDURE — 71045 X-RAY EXAM CHEST 1 VIEW: CPT | Mod: 26

## 2019-08-01 PROCEDURE — 72170 X-RAY EXAM OF PELVIS: CPT | Mod: 26

## 2019-08-01 PROCEDURE — 72125 CT NECK SPINE W/O DYE: CPT | Mod: 26

## 2019-08-01 PROCEDURE — 70450 CT HEAD/BRAIN W/O DYE: CPT | Mod: 26

## 2019-08-01 NOTE — ED PROVIDER NOTE - ATTENDING CONTRIBUTION TO CARE
I performed a history and physical exam of the patient and discussed their management with the resident.  I reviewed the resident's note and agree with the documented findings and plan of care except as noted below. My medical decision making and observations are as follows:    89F, pmh of HTN, CVA presenting after a fall. while trying to sit on toilet with help of daughter, patient fell off the toilet. hit head on side of tub. no loss of consciousness. Pt denies any pain at this time.  Pt has not ambulated since fall and daughter could not help her up.  Pt is moving all extremities.  Pt in NAD, Head NCAT, heart RRR, lungs CTA b/l, abd soft ntnd, no midline vertebral ttp, small visible scrape to left thoracic back, pelvis stable, full ROM upper and lower extremities.  Due to hitting head, will CT head/neck, xray chest and pelvis and reassess.  If patient able to ambulate after images with assistance (baseline uses walker) will consider discharge home.

## 2019-08-01 NOTE — ED PROVIDER NOTE - CLINICAL SUMMARY MEDICAL DECISION MAKING FREE TEXT BOX
89F presenting with mechanical fall. fall witnessed by daughter. no LOC. no blood thinners. plan for ct head/neck, cxr, xray pelvis. will reassess.

## 2019-08-01 NOTE — ED PROVIDER NOTE - NSFOLLOWUPINSTRUCTIONS_ED_ALL_ED_FT
You were seen in the emergency department after a fall     Please follow-up with your primary care doctor in the next 24-48 hours     If you have any worsening symptoms, severe headache, changes in vision, nausea or vomiting please return to the emergency department

## 2019-08-01 NOTE — ED PROVIDER NOTE - PROGRESS NOTE DETAILS
CT and xrays negative for any fx.  Pt ambulated with walker in ER without difficulties.  Pt will be discharged with daughter.  - Faye Gusman DO

## 2019-08-01 NOTE — ED PROVIDER NOTE - PHYSICAL EXAMINATION
general: well appearing female, no acute distress   heent: normocephalic, atraumatic   respiratory: normal work of breathing, lungs clear to auscultation bilaterally   cardiac: regular rate and rhythm   abdomen: soft, non-tender   msk: no midline spinal tenderness to palpation, no tenderness to palpation of extremities   skin: small skin abrasion along left upper back and bilateral knees   neuro: A&Ox3

## 2019-08-01 NOTE — ED PROVIDER NOTE - OBJECTIVE STATEMENT
89F, pmh of HTN, CVA presenting after a fall. daughter was helping patient to toilet when the patient fell off the toilet. hit head on side of tub. no loss of consciousness. denies dizziness, chest pain, difficulty breathing, abdominal pain, pain in extremities, pain or burning with urination. patient was discharged from rehab yesterday. walks with a walker.

## 2019-08-01 NOTE — ED PROVIDER NOTE - CARE PLAN
Principal Discharge DX:	Fall, initial encounter Principal Discharge DX:	Abrasion  Secondary Diagnosis:	Fall, initial encounter

## 2019-08-04 ENCOUNTER — INPATIENT (INPATIENT)
Facility: HOSPITAL | Age: 84
LOS: 3 days | Discharge: NURSING, REHAB OR LONG-TERM CARE | DRG: 554 | End: 2019-08-08
Attending: STUDENT IN AN ORGANIZED HEALTH CARE EDUCATION/TRAINING PROGRAM | Admitting: STUDENT IN AN ORGANIZED HEALTH CARE EDUCATION/TRAINING PROGRAM
Payer: MEDICARE

## 2019-08-04 VITALS
HEIGHT: 60 IN | RESPIRATION RATE: 18 BRPM | WEIGHT: 115.08 LBS | DIASTOLIC BLOOD PRESSURE: 69 MMHG | TEMPERATURE: 99 F | HEART RATE: 78 BPM | SYSTOLIC BLOOD PRESSURE: 122 MMHG | OXYGEN SATURATION: 97 %

## 2019-08-04 DIAGNOSIS — N39.0 URINARY TRACT INFECTION, SITE NOT SPECIFIED: ICD-10-CM

## 2019-08-04 DIAGNOSIS — F03.90 UNSPECIFIED DEMENTIA WITHOUT BEHAVIORAL DISTURBANCE: ICD-10-CM

## 2019-08-04 DIAGNOSIS — Z29.9 ENCOUNTER FOR PROPHYLACTIC MEASURES, UNSPECIFIED: ICD-10-CM

## 2019-08-04 DIAGNOSIS — R50.9 FEVER, UNSPECIFIED: ICD-10-CM

## 2019-08-04 DIAGNOSIS — E87.1 HYPO-OSMOLALITY AND HYPONATREMIA: ICD-10-CM

## 2019-08-04 DIAGNOSIS — M25.439 EFFUSION, UNSPECIFIED WRIST: ICD-10-CM

## 2019-08-04 LAB
ALBUMIN SERPL ELPH-MCNC: 2.9 G/DL — LOW (ref 3.3–5)
ALP SERPL-CCNC: 82 U/L — SIGNIFICANT CHANGE UP (ref 40–120)
ALT FLD-CCNC: 64 U/L — HIGH (ref 10–45)
ANION GAP SERPL CALC-SCNC: 14 MMOL/L — SIGNIFICANT CHANGE UP (ref 5–17)
APPEARANCE UR: ABNORMAL
APTT BLD: 25.7 SEC — LOW (ref 27.5–36.3)
AST SERPL-CCNC: 46 U/L — HIGH (ref 10–40)
B PERT IGG+IGM PNL SER: ABNORMAL
BACTERIA # UR AUTO: ABNORMAL
BASOPHILS # BLD AUTO: 0 K/UL — SIGNIFICANT CHANGE UP (ref 0–0.2)
BASOPHILS NFR BLD AUTO: 0.1 % — SIGNIFICANT CHANGE UP (ref 0–2)
BILIRUB SERPL-MCNC: 0.4 MG/DL — SIGNIFICANT CHANGE UP (ref 0.2–1.2)
BILIRUB UR-MCNC: NEGATIVE — SIGNIFICANT CHANGE UP
BUN SERPL-MCNC: 12 MG/DL — SIGNIFICANT CHANGE UP (ref 7–23)
CALCIUM SERPL-MCNC: 8.9 MG/DL — SIGNIFICANT CHANGE UP (ref 8.4–10.5)
CHLORIDE SERPL-SCNC: 87 MMOL/L — LOW (ref 96–108)
CO2 SERPL-SCNC: 25 MMOL/L — SIGNIFICANT CHANGE UP (ref 22–31)
COLOR FLD: SIGNIFICANT CHANGE UP
COLOR SPEC: YELLOW — SIGNIFICANT CHANGE UP
CREAT SERPL-MCNC: 0.45 MG/DL — LOW (ref 0.5–1.3)
DIFF PNL FLD: ABNORMAL
EOSINOPHIL # BLD AUTO: 0.1 K/UL — SIGNIFICANT CHANGE UP (ref 0–0.5)
EOSINOPHIL NFR BLD AUTO: 0.9 % — SIGNIFICANT CHANGE UP (ref 0–6)
EPI CELLS # UR: 0 /HPF — SIGNIFICANT CHANGE UP
ERYTHROCYTE [SEDIMENTATION RATE] IN BLOOD: 94 MM/HR — HIGH (ref 0–20)
FLUID INTAKE SUBSTANCE CLASS: SIGNIFICANT CHANGE UP
FLUID SEGMENTED GRANULOCYTES: 65 % — SIGNIFICANT CHANGE UP
GAS PNL BLDV: SIGNIFICANT CHANGE UP
GAS PNL BLDV: SIGNIFICANT CHANGE UP
GLUCOSE SERPL-MCNC: 114 MG/DL — HIGH (ref 70–99)
GLUCOSE UR QL: NEGATIVE — SIGNIFICANT CHANGE UP
HCT VFR BLD CALC: 33.3 % — LOW (ref 34.5–45)
HGB BLD-MCNC: 11 G/DL — LOW (ref 11.5–15.5)
HYALINE CASTS # UR AUTO: 4 /LPF — HIGH (ref 0–2)
INR BLD: 1.57 RATIO — HIGH (ref 0.88–1.16)
KETONES UR-MCNC: NEGATIVE — SIGNIFICANT CHANGE UP
LEUKOCYTE ESTERASE UR-ACNC: ABNORMAL
LYMPHOCYTES # BLD AUTO: 0.9 K/UL — LOW (ref 1–3.3)
LYMPHOCYTES # BLD AUTO: 10.1 % — LOW (ref 13–44)
LYMPHOCYTES # FLD: 1 % — SIGNIFICANT CHANGE UP
MAGNESIUM SERPL-MCNC: 1.6 MG/DL — SIGNIFICANT CHANGE UP (ref 1.6–2.6)
MCHC RBC-ENTMCNC: 30 PG — SIGNIFICANT CHANGE UP (ref 27–34)
MCHC RBC-ENTMCNC: 32.9 GM/DL — SIGNIFICANT CHANGE UP (ref 32–36)
MCV RBC AUTO: 91 FL — SIGNIFICANT CHANGE UP (ref 80–100)
MONOCYTES # BLD AUTO: 1.3 K/UL — HIGH (ref 0–0.9)
MONOCYTES NFR BLD AUTO: 13.8 % — SIGNIFICANT CHANGE UP (ref 2–14)
MONOS+MACROS # FLD: 34 % — SIGNIFICANT CHANGE UP
NEUTROPHILS # BLD AUTO: 7 K/UL — SIGNIFICANT CHANGE UP (ref 1.8–7.4)
NEUTROPHILS NFR BLD AUTO: 75.1 % — SIGNIFICANT CHANGE UP (ref 43–77)
NITRITE UR-MCNC: NEGATIVE — SIGNIFICANT CHANGE UP
PH UR: 6.5 — SIGNIFICANT CHANGE UP (ref 5–8)
PHOSPHATE SERPL-MCNC: 3 MG/DL — SIGNIFICANT CHANGE UP (ref 2.5–4.5)
PLATELET # BLD AUTO: 421 K/UL — HIGH (ref 150–400)
POTASSIUM SERPL-MCNC: 4.1 MMOL/L — SIGNIFICANT CHANGE UP (ref 3.5–5.3)
POTASSIUM SERPL-SCNC: 4.1 MMOL/L — SIGNIFICANT CHANGE UP (ref 3.5–5.3)
PROCALCITONIN SERPL-MCNC: 0.12 NG/ML — HIGH (ref 0.02–0.1)
PROT SERPL-MCNC: 7 G/DL — SIGNIFICANT CHANGE UP (ref 6–8.3)
PROT UR-MCNC: SIGNIFICANT CHANGE UP
PROTHROM AB SERPL-ACNC: 18.3 SEC — HIGH (ref 10–12.9)
RBC # BLD: 3.66 M/UL — LOW (ref 3.8–5.2)
RBC # FLD: 12.8 % — SIGNIFICANT CHANGE UP (ref 10.3–14.5)
RBC CASTS # UR COMP ASSIST: 7 /HPF — HIGH (ref 0–4)
RCV VOL RI: HIGH /UL (ref 0–5)
SODIUM SERPL-SCNC: 126 MMOL/L — LOW (ref 135–145)
SP GR SPEC: 1.01 — SIGNIFICANT CHANGE UP (ref 1.01–1.02)
SYNOVIAL CRYSTALS CLARITY: ABNORMAL
SYNOVIAL CRYSTALS COLOR: SIGNIFICANT CHANGE UP
SYNOVIAL CRYSTALS ID: ABNORMAL
SYNOVIAL CRYSTALS TUBE: SIGNIFICANT CHANGE UP
TOTAL NUCLEATED CELL COUNT, BODY FLUID: HIGH /UL (ref 0–5)
TUBE TYPE: SIGNIFICANT CHANGE UP
UROBILINOGEN FLD QL: NEGATIVE — SIGNIFICANT CHANGE UP
WBC # BLD: 9.4 K/UL — SIGNIFICANT CHANGE UP (ref 3.8–10.5)
WBC # FLD AUTO: 9.4 K/UL — SIGNIFICANT CHANGE UP (ref 3.8–10.5)
WBC UR QL: 131 /HPF — HIGH (ref 0–5)

## 2019-08-04 PROCEDURE — 71045 X-RAY EXAM CHEST 1 VIEW: CPT | Mod: 26

## 2019-08-04 PROCEDURE — 73130 X-RAY EXAM OF HAND: CPT | Mod: 26,50

## 2019-08-04 PROCEDURE — 93010 ELECTROCARDIOGRAM REPORT: CPT

## 2019-08-04 PROCEDURE — 99284 EMERGENCY DEPT VISIT MOD MDM: CPT

## 2019-08-04 PROCEDURE — 99223 1ST HOSP IP/OBS HIGH 75: CPT | Mod: GC,AI

## 2019-08-04 RX ORDER — PREDNISOLONE SODIUM PHOSPHATE 1 %
1 DROPS OPHTHALMIC (EYE)
Qty: 0 | Refills: 0 | DISCHARGE

## 2019-08-04 RX ORDER — ACETAMINOPHEN 500 MG
650 TABLET ORAL ONCE
Refills: 0 | Status: COMPLETED | OUTPATIENT
Start: 2019-08-04 | End: 2019-08-04

## 2019-08-04 RX ORDER — VANCOMYCIN HCL 1 G
1000 VIAL (EA) INTRAVENOUS EVERY 12 HOURS
Refills: 0 | Status: DISCONTINUED | OUTPATIENT
Start: 2019-08-05 | End: 2019-08-05

## 2019-08-04 RX ORDER — HEPARIN SODIUM 5000 [USP'U]/ML
5000 INJECTION INTRAVENOUS; SUBCUTANEOUS EVERY 8 HOURS
Refills: 0 | Status: DISCONTINUED | OUTPATIENT
Start: 2019-08-04 | End: 2019-08-08

## 2019-08-04 RX ORDER — PIPERACILLIN AND TAZOBACTAM 4; .5 G/20ML; G/20ML
3.38 INJECTION, POWDER, LYOPHILIZED, FOR SOLUTION INTRAVENOUS ONCE
Refills: 0 | Status: COMPLETED | OUTPATIENT
Start: 2019-08-04 | End: 2019-08-04

## 2019-08-04 RX ORDER — SIMVASTATIN 20 MG/1
1 TABLET, FILM COATED ORAL
Qty: 0 | Refills: 0 | DISCHARGE

## 2019-08-04 RX ORDER — PREDNISOLONE SODIUM PHOSPHATE 1 %
1 DROPS OPHTHALMIC (EYE)
Refills: 0 | Status: DISCONTINUED | OUTPATIENT
Start: 2019-08-04 | End: 2019-08-08

## 2019-08-04 RX ORDER — PIPERACILLIN AND TAZOBACTAM 4; .5 G/20ML; G/20ML
3.38 INJECTION, POWDER, LYOPHILIZED, FOR SOLUTION INTRAVENOUS EVERY 8 HOURS
Refills: 0 | Status: DISCONTINUED | OUTPATIENT
Start: 2019-08-04 | End: 2019-08-05

## 2019-08-04 RX ORDER — VANCOMYCIN HCL 1 G
1000 VIAL (EA) INTRAVENOUS ONCE
Refills: 0 | Status: COMPLETED | OUTPATIENT
Start: 2019-08-04 | End: 2019-08-04

## 2019-08-04 RX ADMIN — PIPERACILLIN AND TAZOBACTAM 200 GRAM(S): 4; .5 INJECTION, POWDER, LYOPHILIZED, FOR SOLUTION INTRAVENOUS at 13:26

## 2019-08-04 RX ADMIN — Medication 250 MILLIGRAM(S): at 13:24

## 2019-08-04 RX ADMIN — Medication 650 MILLIGRAM(S): at 13:44

## 2019-08-04 RX ADMIN — HEPARIN SODIUM 5000 UNIT(S): 5000 INJECTION INTRAVENOUS; SUBCUTANEOUS at 22:51

## 2019-08-04 RX ADMIN — Medication 1 DROP(S): at 22:55

## 2019-08-04 RX ADMIN — PIPERACILLIN AND TAZOBACTAM 3.38 GRAM(S): 4; .5 INJECTION, POWDER, LYOPHILIZED, FOR SOLUTION INTRAVENOUS at 15:55

## 2019-08-04 RX ADMIN — PIPERACILLIN AND TAZOBACTAM 25 GRAM(S): 4; .5 INJECTION, POWDER, LYOPHILIZED, FOR SOLUTION INTRAVENOUS at 21:00

## 2019-08-04 RX ADMIN — Medication 1000 MILLIGRAM(S): at 15:55

## 2019-08-04 NOTE — PROGRESS NOTE ADULT - SUBJECTIVE AND OBJECTIVE BOX
Pt seen   Chart reviewed  Full consult dictation to follow    ? b/l wrist infections, inflammatory process, septic joint  Hx of similar episodes in past, seen by multiple plastic/hand surgeons  Rec:  needle aspiration left wrist - performed at bedside and fluid sent for C&S and path  reposition right wrist IV  elevation, rest  Rheumatology consult  NSAID's  Abx  Pt is a poor candidate for surgical intervention and would avoid if possible  Will f/u in house PRN

## 2019-08-04 NOTE — H&P ADULT - PROBLEM SELECTOR PLAN 3
- No hx of prior MDR urinary infection; Currently asymptomatic; No fever at present, no leukocytosis; Monitor off Abx for now  - F.u Ucx  - CBC and lactate - No hx of prior MDR urinary infection; Currently asymptomatic; No fever at present, no leukocytosis  - F.u Ucx  - CBC and lactate

## 2019-08-04 NOTE — PATIENT PROFILE ADULT - NSPROGENDIFFINTUB_GEN_A_NUR
Requested Prescriptions     Pending Prescriptions Disp Refills    phentermine (ADIPEX-P) 37.5 mg tablet 30 Tab 0     Sig: Take 1 Tab by mouth every morning.  Max Daily Amount: 37.5 mg.
never intubated

## 2019-08-04 NOTE — H&P ADULT - ASSESSMENT
89 y/o female PMHx HLD, mild dementia, R cataract c/b corneal injury (blindness), ?prior subclinical L basal ganglia CVA p/w bilateral wrist pain, erythema and swelling x 2 days in setting of fever concerning for septic joint vs gout flare, now s/p aspiration with culture/pathology/stain pending      ******************************************INCOMPLETE NOTE********** 89 y/o female PMHx HLD, mild dementia, R cataract c/b corneal injury (blindness), ?prior subclinical L basal ganglia CVA p/w bilateral wrist pain, erythema and swelling x 2 days in setting of fever concerning for septic joint vs gout flare, now s/p aspiration with culture/pathology/stain pending

## 2019-08-04 NOTE — ED ADULT NURSE NOTE - NSIMPLEMENTINTERV_GEN_ALL_ED
Implemented All Fall with Harm Risk Interventions:  [place] to call system. Call bell, personal items and telephone within reach. Instruct patient to call for assistance. Room bathroom lighting operational. Non-slip footwear when patient is off stretcher. Physically safe environment: no spills, clutter or unnecessary equipment. Stretcher in lowest position, wheels locked, appropriate side rails in place. Provide visual cue, wrist band, yellow gown, etc. Monitor gait and stability. Monitor for mental status changes and reorient to person, place, and time. Review medications for side effects contributing to fall risk. Reinforce activity limits and safety measures with patient and family. Provide visual clues: red socks.

## 2019-08-04 NOTE — H&P ADULT - PROBLEM SELECTOR PLAN 1
- Unknown etiology at this time; Ddx includes Septic vs crystalline (CPPD) vs new Gout; in setting of fever 101.9 ( on admission) with worsening b/l wrist pain/swelling/erythema   -  Patient with recent hospitalization for similar symptoms; Workup included MR b/l wrists concerning for septic process, Xray with chondrocalcinosis with normal WBC, multiple joint involvement, afebrile thought to be suggestive of pseudogout; patient given CTX/vanc as well as Prednisone taper (6/27-7/10) with improvement of symptoms.   - Seen by plastic surgery; s/p aspiration sent for culture, stain, and pathology;   - C/w Vancomycin  - F/u blood cx  - F/u CRP  - Rheum Consult  -  NSAIDs for symptom relief and pain  - Cold compress b/l wrists - Unknown etiology at this time; Ddx includes Septic vs crystalline (CPPD) vs new Gout; in setting of fever 101.9 ( on admission) with worsening b/l wrist pain/swelling/erythema   -  Patient with recent hospitalization for similar symptoms; Workup included MR b/l wrists concerning for septic process, Xray with chondrocalcinosis with normal WBC, multiple joint involvement, afebrile thought to be suggestive of pseudogout; patient given CTX/vanc as well as Prednisone taper (6/27-7/10) with improvement of symptoms.   - Seen by plastic surgery; s/p aspiration sent for culture, stain, and pathology;   - C/w Vancomycin  - F/u blood cx  - F/u CRP  - Rheum Consult  -  Tylenol for pain for now  - If fluid yields crystals, would consider treating with steroids if no signs of infection  - Cold compress b/l wrists - Unknown etiology at this time; Ddx includes Septic vs crystalline (CPPD) vs new Gout; in setting of fever 101.9 ( on admission) with worsening b/l wrist pain/swelling/erythema   -  Patient with recent hospitalization for similar symptoms; Workup included MR b/l wrists concerning for septic process, Xray with chondrocalcinosis with normal WBC, multiple joint involvement, afebrile thought to be suggestive of pseudogout; patient given CTX/vanc as well as Prednisone taper (6/27-7/10) with improvement of symptoms.   - Seen by plastic surgery; s/p aspiration sent for culture, stain, and pathology;   - C/w Vancomycin/zosyn  - F/u blood cx  - F/u CRP  - Rheum Consult in AM  -  Tylenol for pain for now  - If fluid yields crystals, would consider treating with steroids if no signs of infection  - Cold compress b/l wrists - Unknown etiology at this time; Ddx includes Septic vs crystalline (CPPD) vs new Gout; in setting of fever 101.9 ( on admission) with worsening b/l wrist pain/swelling/erythema   -  Patient with recent hospitalization for similar symptoms; Workup included MR b/l wrists concerning for septic process, Xray with chondrocalcinosis with normal WBC, multiple joint involvement, afebrile thought to be suggestive of pseudogout; patient given CTX/vanc as well as Prednisone taper (6/27-7/10) with improvement of symptoms.   - Seen by plastic surgery; s/p aspiration sent for culture, stain, and pathology;   - C/w Vancomycin/zosyn  - F/u blood cx  - F/u CRP  - Rheum Consult in AM  - Tylenol for pain for now  - If fluid yields crystals, would consider treating with steroids if no signs of infection  - Cold compress b/l wrists

## 2019-08-04 NOTE — ED PROVIDER NOTE - OBJECTIVE STATEMENT
89 y/o female PMHx HLD, mild dementia, R cataract c/b corneal injury, ?prior subclinical L basal ganglia CVA p/w R wrist pain and swelling x 2 days, concerning for possible wrist infection. Patient recently discharged on 6/28/19 for similar issue treated with vancomycin and ceftriaxone as MRI was concerning for septic joint. Patient was discharged to complete IV abx on 7/18/19 and discharged on prednisone taper. Patient lives with daughter who stated the hands are erythematous, swollen, and patient has been complaining of pain. 89 y/o female PMHx HLD, mild dementia, R cataract c/b corneal injury, ?prior subclinical L basal ganglia CVA p/w bilateral wrist pain, erythema and swelling x 2 days, concerning for possible wrist infection. Patient daughter stated the right wrist symptoms appeared two days ago and left wrist pain started today. Patient recently discharged on 6/28/19 for similar issue treated with vancomycin and ceftriaxone as MRI was concerning for septic joint. Patient was discharged to complete IV abx on 7/18/19 and discharged on prednisone taper. Patient lives with daughter who stated the hands are erythematous, swollen, and patient has been complaining of pain.

## 2019-08-04 NOTE — H&P ADULT - NSHPPHYSICALEXAM_GEN_ALL_CORE
Vital Signs Last 24 Hrs  T(C): 37.2 (04 Aug 2019 16:24), Max: 38.8 (04 Aug 2019 13:43)  T(F): 99 (04 Aug 2019 16:24), Max: 101.9 (04 Aug 2019 13:43)  HR: 77 (04 Aug 2019 16:24) (77 - 78)  BP: 123/70 (04 Aug 2019 16:24) (122/69 - 123/70)  BP(mean): --  RR: 18 (04 Aug 2019 16:24) (18 - 18)  SpO2: 97% (04 Aug 2019 16:24) (97% - 97%)    GENERAL: Elderly female, thin, agitated, asking why she is not at rehab  HEAD:  Atraumatic, Normocephalic  EYES: Right eye cataracts; not able to assess EOM  Mouth: MMM, no lesions  NECK: Supple, no appreciable masses  Lung: normal work of breathing, cta b/l  Chest: S1&S2+, rrr, no m/r/g appreciated  ABDOMEN: bs+, soft, nt, nd, no appreciable masses  : No cortez catheter, no CVA tenderness  EXTREMITIES:  radial pulse present b/l, PT present b/l, no pitting edema present; BLE wrist: +erythema, swelling TTP; Patient moving wrists and hands, however unable to assess ROM as not following commands  Neuro: A&Ox1; patient moving all extremities; not answering questions appropriately, repeating herself  SKIN: warm and dry, no visible rashes

## 2019-08-04 NOTE — ED ADULT NURSE NOTE - OBJECTIVE STATEMENT
89 yr old female came in with bilat hand redness and swelling, was recently treated for the same infection. on assessment a and o x 3 lungs clear abd soft non tender no swelling in legs but swelling in both hands hot to tough. no n/v/d fever. no other complaints.

## 2019-08-04 NOTE — H&P ADULT - NSHPLABSRESULTS_GEN_ALL_CORE
LABS:                         11.0   9.4   )-----------( 421      ( 04 Aug 2019 13:18 )             33.3     08-04    126<L>  |  87<L>  |  12  ----------------------------<  114<H>  4.1   |  25  |  0.45<L>    Ca    8.9      04 Aug 2019 13:18  Phos  3.0     08-04  Mg     1.6     08-04    TPro  7.0  /  Alb  2.9<L>  /  TBili  0.4  /  DBili  x   /  AST  46<H>  /  ALT  64<H>  /  AlkPhos  82  08-04    PT/INR - ( 04 Aug 2019 13:18 )   PT: 18.3 sec;   INR: 1.57 ratio         PTT - ( 04 Aug 2019 13:18 )  PTT:25.7 sec  Urinalysis Basic - ( 04 Aug 2019 14:18 )    Color: Yellow / Appearance: Slightly Turbid / S.010 / pH: x  Gluc: x / Ketone: Negative  / Bili: Negative / Urobili: Negative   Blood: x / Protein: Trace / Nitrite: Negative   Leuk Esterase: Large / RBC: 7 /hpf /  /HPF   Sq Epi: x / Non Sq Epi: 0 /hpf / Bacteria: Many      X-ray B/l Hand  FINDINGS:    Left hand:  Soft tissue tissue swelling over the dorsal aspect of the left wrist. No   radiopaque foreign body or tracking subcutaneous collection. No cortical   erosion or periosteal reaction. No acute fracture. No dislocation.   Chronic fracture deformity of the left distal radius. Moderate   radiocarpal, basal joint and metacarpophalangeal joint arthrosis.   Redemonstrated advanced SLAC arthropathy.     Right hand:  No acute fracture or dislocation. Widening of the scapholunate joint   suggestive of advanced SLAC arthropathy. Moderate basal joint and STT   joint arthrosis. Moderate narrowing of the metacarpophalangeal joint   spaces. A peripheral intravenous catheter overlies the dorsal wrist.    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  Soft tissue   swelling over the dorsal aspect of the left wrist. No radiographic   evidence for osteomyelitis. If continued suspicion for osteomyelitis,   bone scan or MRI is needed.        CXR:   FINDINGS:    The lungs are clear. No pleural effusion or pneumothorax.    Heart size is within normal limits.      IMPRESSION:     Clear lungs

## 2019-08-04 NOTE — ED PROVIDER NOTE - PROGRESS NOTE DETAILS
MAUREEN Snow: patient received 1L IVF with the vancomycin. Will repeat latate but will hold off on further fluids given patient age and do not want to cause pulm edema though pt meets sepsis criteria MAUREEN Snow: patient received 1L IVF with the vancomycin. Will repeat lactate but will hold off on further fluids given patient age and do not want to cause pulm edema though pt meets sepsis criteria MAUREEN Snow: plastics Dr. Salomon aspirated left wrist at bedside and I dropped off specimen at the lab myself for culture with gram stain, crystal, and cell count

## 2019-08-04 NOTE — ED PROVIDER NOTE - CLINICAL SUMMARY MEDICAL DECISION MAKING FREE TEXT BOX
89 y old f with recurrent cellulitis rt wrist vs gout vs septic arthritis ,treated with antibiotics 2 weeks ago ,no arthrocentesis was done ,will obtain blood work ,culture ,iv antibiotics ,admission [facility]

## 2019-08-04 NOTE — H&P ADULT - PROBLEM SELECTOR PLAN 5
DVT ppx: Hep SC q8  Diet: regular DVT ppx: Hep SC q8  Diet: regular  GOC: Clarify GOC/code status with family in AM

## 2019-08-04 NOTE — H&P ADULT - PROBLEM SELECTOR PLAN 4
- Patient with Serum Na 126 on presentation; possibly 2/2 SIADH in setting of infection/pain  - Will send Urine lytes, Uosm, Serum Osm

## 2019-08-04 NOTE — PATIENT PROFILE ADULT - PATIENT REPRESENTATIVE: ( YOU CAN CHOOSE ANY PERSON THAT CAN ASSIST YOU WITH YOUR HEALTH CARE PREFERENCES, DOES NOT HAVE TO BE A SPOUSE, IMMEDIATE FAMILY OR SIGNIFICANT OTHER/PARTNER)
Re:  Celeste Ruby,Jerson Lim  was admitted to [facility] Jj Stack. Liam Denney M.D. Geetha Wilkinson yes

## 2019-08-04 NOTE — H&P ADULT - HISTORY OF PRESENT ILLNESS
History obtained from Chart review as patient poor historian 2/2 dementa    89 y/o female PMHx HLD, mild dementia, R cataract c/b corneal injury, ?prior subclinical L basal ganglia CVA p/w bilateral wrist pain, erythema and swelling x 2 days. Daughter was initially at bedside in ED and reported right wrist symptoms appeared two days ago and left wrist pain started today. Patient recently discharged on 6/28/19 for similar symptoms - workup included MR b/l wrists concerning for septic process, Xray with chondrocalcinosis with normal WBC, multiple joint involvement, afebrile thought to be suggestive of pseudogout; patient given CTX/vanc (completed on 7/18 via PICC) as well as Prednisone taper (6/27-7/10) with improvement of symptoms. Patient lives with daughter who stated the hands are erythematous, swollen, and patient has been complaining of pain.Patient reporting b/l wrist pain at present. She denies fever, chills, n/v/d, chest pain or sob. Patient denies urinary symptoms or uri symptoms.

## 2019-08-05 LAB
ANION GAP SERPL CALC-SCNC: 15 MMOL/L — SIGNIFICANT CHANGE UP (ref 5–17)
BASOPHILS # BLD AUTO: 0 K/UL — SIGNIFICANT CHANGE UP (ref 0–0.2)
BASOPHILS NFR BLD AUTO: 0.2 % — SIGNIFICANT CHANGE UP (ref 0–2)
BUN SERPL-MCNC: 10 MG/DL — SIGNIFICANT CHANGE UP (ref 7–23)
CALCIUM SERPL-MCNC: 8.8 MG/DL — SIGNIFICANT CHANGE UP (ref 8.4–10.5)
CHLORIDE SERPL-SCNC: 92 MMOL/L — LOW (ref 96–108)
CO2 SERPL-SCNC: 26 MMOL/L — SIGNIFICANT CHANGE UP (ref 22–31)
CREAT SERPL-MCNC: 0.48 MG/DL — LOW (ref 0.5–1.3)
EOSINOPHIL # BLD AUTO: 0.2 K/UL — SIGNIFICANT CHANGE UP (ref 0–0.5)
EOSINOPHIL NFR BLD AUTO: 2.6 % — SIGNIFICANT CHANGE UP (ref 0–6)
GLUCOSE SERPL-MCNC: 95 MG/DL — SIGNIFICANT CHANGE UP (ref 70–99)
GRAM STN FLD: SIGNIFICANT CHANGE UP
HCT VFR BLD CALC: 32.5 % — LOW (ref 34.5–45)
HGB BLD-MCNC: 10.6 G/DL — LOW (ref 11.5–15.5)
LACTATE SERPL-SCNC: 1 MMOL/L — SIGNIFICANT CHANGE UP (ref 0.7–2)
LYMPHOCYTES # BLD AUTO: 1.1 K/UL — SIGNIFICANT CHANGE UP (ref 1–3.3)
LYMPHOCYTES # BLD AUTO: 11.8 % — LOW (ref 13–44)
MAGNESIUM SERPL-MCNC: 1.5 MG/DL — LOW (ref 1.6–2.6)
MCHC RBC-ENTMCNC: 29.7 PG — SIGNIFICANT CHANGE UP (ref 27–34)
MCHC RBC-ENTMCNC: 32.5 GM/DL — SIGNIFICANT CHANGE UP (ref 32–36)
MCV RBC AUTO: 91.2 FL — SIGNIFICANT CHANGE UP (ref 80–100)
MONOCYTES # BLD AUTO: 0.9 K/UL — SIGNIFICANT CHANGE UP (ref 0–0.9)
MONOCYTES NFR BLD AUTO: 9.3 % — SIGNIFICANT CHANGE UP (ref 2–14)
NEUTROPHILS # BLD AUTO: 7.1 K/UL — SIGNIFICANT CHANGE UP (ref 1.8–7.4)
NEUTROPHILS NFR BLD AUTO: 76.1 % — SIGNIFICANT CHANGE UP (ref 43–77)
PHOSPHATE SERPL-MCNC: 3.3 MG/DL — SIGNIFICANT CHANGE UP (ref 2.5–4.5)
PLATELET # BLD AUTO: 412 K/UL — HIGH (ref 150–400)
POTASSIUM SERPL-MCNC: 3.3 MMOL/L — LOW (ref 3.5–5.3)
POTASSIUM SERPL-SCNC: 3.3 MMOL/L — LOW (ref 3.5–5.3)
RBC # BLD: 3.56 M/UL — LOW (ref 3.8–5.2)
RBC # FLD: 12.8 % — SIGNIFICANT CHANGE UP (ref 10.3–14.5)
SODIUM SERPL-SCNC: 133 MMOL/L — LOW (ref 135–145)
SPECIMEN SOURCE: SIGNIFICANT CHANGE UP
WBC # BLD: 9.3 K/UL — SIGNIFICANT CHANGE UP (ref 3.8–10.5)
WBC # FLD AUTO: 9.3 K/UL — SIGNIFICANT CHANGE UP (ref 3.8–10.5)

## 2019-08-05 PROCEDURE — 99222 1ST HOSP IP/OBS MODERATE 55: CPT

## 2019-08-05 PROCEDURE — 99233 SBSQ HOSP IP/OBS HIGH 50: CPT | Mod: GC

## 2019-08-05 PROCEDURE — 99223 1ST HOSP IP/OBS HIGH 75: CPT | Mod: GC

## 2019-08-05 RX ORDER — TRAZODONE HCL 50 MG
50 TABLET ORAL DAILY
Refills: 0 | Status: DISCONTINUED | OUTPATIENT
Start: 2019-08-05 | End: 2019-08-08

## 2019-08-05 RX ORDER — COLCHICINE 0.6 MG
0.6 TABLET ORAL DAILY
Refills: 0 | Status: DISCONTINUED | OUTPATIENT
Start: 2019-08-06 | End: 2019-08-08

## 2019-08-05 RX ORDER — LANOLIN ALCOHOL/MO/W.PET/CERES
5 CREAM (GRAM) TOPICAL AT BEDTIME
Refills: 0 | Status: DISCONTINUED | OUTPATIENT
Start: 2019-08-05 | End: 2019-08-08

## 2019-08-05 RX ORDER — COLCHICINE 0.6 MG
1.2 TABLET ORAL ONCE
Refills: 0 | Status: COMPLETED | OUTPATIENT
Start: 2019-08-05 | End: 2019-08-05

## 2019-08-05 RX ORDER — ACETAMINOPHEN 500 MG
975 TABLET ORAL EVERY 6 HOURS
Refills: 0 | Status: DISCONTINUED | OUTPATIENT
Start: 2019-08-05 | End: 2019-08-08

## 2019-08-05 RX ORDER — VANCOMYCIN HCL 1 G
1000 VIAL (EA) INTRAVENOUS EVERY 12 HOURS
Refills: 0 | Status: DISCONTINUED | OUTPATIENT
Start: 2019-08-05 | End: 2019-08-06

## 2019-08-05 RX ORDER — TRAZODONE HCL 50 MG
50 TABLET ORAL ONCE
Refills: 0 | Status: DISCONTINUED | OUTPATIENT
Start: 2019-08-05 | End: 2019-08-05

## 2019-08-05 RX ORDER — PIPERACILLIN AND TAZOBACTAM 4; .5 G/20ML; G/20ML
3.38 INJECTION, POWDER, LYOPHILIZED, FOR SOLUTION INTRAVENOUS EVERY 8 HOURS
Refills: 0 | Status: DISCONTINUED | OUTPATIENT
Start: 2019-08-05 | End: 2019-08-06

## 2019-08-05 RX ADMIN — Medication 50 MILLIGRAM(S): at 21:41

## 2019-08-05 RX ADMIN — Medication 5 MILLIGRAM(S): at 21:41

## 2019-08-05 RX ADMIN — Medication 250 MILLIGRAM(S): at 12:40

## 2019-08-05 RX ADMIN — PIPERACILLIN AND TAZOBACTAM 25 GRAM(S): 4; .5 INJECTION, POWDER, LYOPHILIZED, FOR SOLUTION INTRAVENOUS at 13:40

## 2019-08-05 RX ADMIN — Medication 50 MILLIGRAM(S): at 10:28

## 2019-08-05 RX ADMIN — PIPERACILLIN AND TAZOBACTAM 25 GRAM(S): 4; .5 INJECTION, POWDER, LYOPHILIZED, FOR SOLUTION INTRAVENOUS at 21:37

## 2019-08-05 RX ADMIN — HEPARIN SODIUM 5000 UNIT(S): 5000 INJECTION INTRAVENOUS; SUBCUTANEOUS at 06:36

## 2019-08-05 RX ADMIN — Medication 1 DROP(S): at 06:35

## 2019-08-05 RX ADMIN — HEPARIN SODIUM 5000 UNIT(S): 5000 INJECTION INTRAVENOUS; SUBCUTANEOUS at 13:40

## 2019-08-05 RX ADMIN — Medication 975 MILLIGRAM(S): at 21:38

## 2019-08-05 RX ADMIN — Medication 250 MILLIGRAM(S): at 01:00

## 2019-08-05 RX ADMIN — Medication 1.2 MILLIGRAM(S): at 21:37

## 2019-08-05 RX ADMIN — Medication 975 MILLIGRAM(S): at 22:00

## 2019-08-05 RX ADMIN — Medication 1 DROP(S): at 18:17

## 2019-08-05 RX ADMIN — PIPERACILLIN AND TAZOBACTAM 25 GRAM(S): 4; .5 INJECTION, POWDER, LYOPHILIZED, FOR SOLUTION INTRAVENOUS at 07:06

## 2019-08-05 NOTE — PROGRESS NOTE ADULT - PROBLEM SELECTOR PLAN 2
- alert and oriented x 2 currently  - for agitation, reorientation and reassurance, then trazodone 50 mg qd prn

## 2019-08-05 NOTE — PROGRESS NOTE ADULT - PROBLEM SELECTOR PLAN 1
- Ddx includes septic vs crystalline (CPPD); in setting of fever 101.9 (on admission) with worsening b/l wrist pain/swelling/erythema; now afebrile   -  Patient with recent hospitalization for similar symptoms; Workup included MR b/l wrists concerning for septic process, Xray with chondrocalcinosis with normal WBC, multiple joint involvement, afebrile thought to be suggestive of pseudogout; given CTX/vanc as well as Prednisone taper (6/27-7/10) with improvement of symptoms.   - seen by plastic surgery, recommended rheum consult and NSAIDs  - aspiration showed [id number] nucleated cells with rare CPP crystals, pink, Gram stain showed no organisms, awaiting cultures  - c/w Vancomycin/zosyn  - f/u blood cx  - f/u CRP  - rheum Consult in AM  - acetaminophen for pain for now  - if fluid yields crystals, would consider treating with steroids if no signs of infection  - cold compress b/l wrists - Ddx includes septic vs crystalline (CPPD); in setting of fever 101.9 (on admission) with worsening b/l wrist pain/swelling/erythema; now afebrile   - Patient with recent hospitalization for similar symptoms; workup included MR b/l wrists concerning for septic process, Xray with chondrocalcinosis with normal WBC, multiple joint involvement, afebrile thought to be suggestive of pseudogout; given CTX/vanc as well as Prednisone taper (6/27-7/10) with improvement of symptoms.   - seen by plastic surgery, recommended rheum consult and NSAIDs  - aspiration showed [id number] nucleated cells with rare CPP crystals, pink, Gram stain showed no organisms, awaiting cultures  - c/w Vancomycin/zosyn  - f/u blood cx  - f/u CRP  - rheumatology consulted  - acetaminophen for pain for now  - if fluid yields crystals, would consider treating with steroids if no signs of infection  - cold compress b/l wrists - differential includes septic vs crystalline (CPPD); in setting of fever 101.9 (on admission) with worsening b/l wrist pain/swelling/erythema; now afebrile   - patient with recent hospitalization for similar symptoms; thought to be pseudogout; given CTX/vanc, Prednisone taper (6/27-7/10) with sxs improved  - seen by plastic surgery, recommended rheum consult and NSAIDs  - aspiration showed [id number] nucleated cells with rare CPP crystals, pink, Gram stain showed no organisms, awaiting cultures, CRP 94   - c/w vancomycin/zosyn for now, with trough after every 4th dose  - f/u blood cx and joint aspiration cx  - rheumatology consulted  - acetaminophen for pain for now  - consider treating with steroids if no signs of infection  - cold compress b/l wrists - differential includes septic vs crystalline (CPPD) in setting of fever (on admission) with b/l wrist pain/swelling/erythema; now afebrile   - patient with recent hospitalization for similar sxs; thought to be pseudogout; given ceftriaxone/vanc, prednisone taper (6/27-7/10) with sxs improved  - seen by plastic surgery, recommended rheum consult and NSAIDs  - aspiration showed [id number] nucleated cells with rare CPP crystals, pink, Gram stain showed no organisms, awaiting cultures, CRP 94   - c/w vancomycin/zosyn for now, with trough after every 4th dose  - f/u blood cx and joint aspiration cx  - rheumatology consulted, acetaminophen for pain for now  - consider treating with steroids if no signs of infection  - cold compress b/l wrists

## 2019-08-05 NOTE — PROGRESS NOTE ADULT - PROBLEM SELECTOR PLAN 5
DVT ppx: Hep SC q8  Diet: regular  GOC: Clarify GOC/code status with family in AM DVT ppx: Hep sc q8  Diet: regular  GOC: clarify GOC/code status with daughter (proxy) when she visits later today DVT ppx: Hep sc q8  Diet: regular  GOC: clarify GOC/code status with daughter (proxy) when she visits later today  Dispo: pending diagnosis and improvement of sxs, ordered PT

## 2019-08-05 NOTE — PROGRESS NOTE ADULT - ASSESSMENT
89 y/o female PMHx HLD, mild dementia, R cataract c/b corneal injury (blindness), ?prior subclinical L basal ganglia CVA p/w bilateral wrist pain, erythema and swelling x 2 days in setting of fever concerning for septic joint vs pseudogout flare, now s/p aspiration which showed many nucleated cells and rare pyrophosphate crystals. 89 y/o female PMHx HLD, mild dementia, R cataract c/b corneal injury (blindness), ?prior subclinical L basal ganglia CVA p/w bilateral wrist pain, erythema and swelling x 2 days in setting of fever concerning for septic joint vs pseudogout flare, now s/p aspiration which showed many nucleated cells and some calcium pyrophosphate crystals. 89 y/o female PMHx HLD, mild dementia, R cataract c/b corneal injury (blindness), ?prior subclinical L basal ganglia CVA p/w bilateral wrist pain, erythema and swelling x2 days in setting of fever concerning for septic joint vs pseudogout flare, now s/p aspiration which showed many nucleated cells and some calcium pyrophosphate crystals.

## 2019-08-05 NOTE — CONSULT NOTE ADULT - ASSESSMENT
89 yr old was hospitalized in 6/2019 for bilateral wrist pain and swelling and teated empirically for septic arthritis and pseudogout.  seemed  to resolve but readmitted with swelling mostly on left, 100k wbc in the wrist and no cellulitis  crystals are positive and NOS on gram stain  urine culture is positive for gram negative rods but denies any UTI symptoms.       It is unlikely that she has both pseudogout and septic arthritis at the same time.  It rarelyu is bilateral and should not relapse with negative cultures  pseudogout can cause fever.     would dc ab and give course of steroids   no need to treat the urine culture at present

## 2019-08-05 NOTE — CONSULT NOTE ADULT - ASSESSMENT
89yo F with PMHx HTN, CVA and a recent admission for management of possible wrist septic arthritis vs CPPD disease. presented to the hospital for evaluation of two days of wrist pain, swelling and erythema mostly on the left hand. PE with significant synovitis of the left wrist along with erythema and tenderness on exam. Arthrocentesis of left wrist consistent with inflammatory arthritis with more than 100,000 cell with neutrophil predominance.       Oligoarthritis of the b/l wrist with the left wrist more involve that the right: due to the significant elevation of WBC on synovial fluids analysis etiology likely CPPD vs septic. Bilateral involvement and second presentation with similar symptoms favors crystal disease.  Can continue with antibiotics until synovial fluid negative  can start colchicine today. Single dose of 1.2 mg today and continue with colchicine 0.6 mg daily tomorrow  will reassess response tomorrow   f/u plastics recommendations      Bola Aguilera Rheum Fellow I  d/w Attending Dr. Nassar 89yo F with PMHx HTN, CVA and a recent admission for management of possible wrist septic arthritis vs CPPD disease. presented to the hospital for evaluation of two days of wrist pain, swelling and erythema mostly on the left hand. PE with significant synovitis of the left wrist along with erythema and tenderness on exam. Arthrocentesis of left wrist consistent with inflammatory arthritis with more than 100,000 cell with neutrophil predominance.     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  Arthrocentesis with cppd crystals.  Extremely high elevation of WBC >100K is atypical for crystal disease but can be seen.  Gm stain thus far negative and patient afebrile with nl wbc.  Agree with culture but presentation and data thus far more consistent with pseudogout flare as opposed to infectious arthritis.    >Can continue with antibiotics until synovial fluid negative  >can start colchicine today. Single dose of 1.2 mg today and continue with colchicine 0.6 mg daily tomorrow  > if cultures negative and exam consistent will consider steroids tomorrow  >will reassess response tomorrow   >f/u plastics recommendations      Bola Aguilera Rheum Fellow I  d/w Attending Dr. Nassar

## 2019-08-05 NOTE — PROGRESS NOTE ADULT - PROBLEM SELECTOR PLAN 3
- no hx of prior MDR urinary infection; asymptomatic; no fever or leukocytosis  - f/u UCx  - lactate was 1.0 - no hx of prior MDR urinary infection; asymptomatic; no fever or leukocytosis  - f/u UCx, lactate was 1.0

## 2019-08-05 NOTE — CONSULT NOTE ADULT - SUBJECTIVE AND OBJECTIVE BOX
Patient is a 89y old  Female who presents with a chief complaint of Wrist swelling (05 Aug 2019 08:17)    HPI:  History obtained from Chart review as patient poor historian 2/2 dementa    89 y/o female PMHx HLD, mild dementia, R cataract c/b corneal injury, ?prior subclinical L basal ganglia CVA p/w bilateral wrist pain, erythema and swelling x 2 days. Daughter was initially at bedside in ED and reported right wrist symptoms appeared two days ago and left wrist pain started today. Patient recently discharged on 6/28/19 for similar symptoms - workup included MR b/l wrists concerning for septic process, Xray with chondrocalcinosis with normal WBC, multiple joint involvement, afebrile thought to be suggestive of pseudogout; patient given CTX/vanc (completed on 7/18 via PICC) as well as Prednisone taper (6/27-7/10) with improvement of symptoms. Patient lives with daughter who stated the hands are erythematous, swollen, and patient has been complaining of pain.Patient reporting b/l wrist pain at present. She denies fever, chills, n/v/d, chest pain or sob. Patient denies urinary symptoms or uri symptoms. (04 Aug 2019 16:41)      PAST MEDICAL & SURGICAL HISTORY:  Wrist fracture  Hip fracture  HTN (Hypertension)  Cerebrovascular Accident (Stroke)  Cataract  Status Post Small Bowel Resection  C Section  S/P Exploratory Laparotomy      Social history:    FAMILY HISTORY:  Paternal family history of hypertension    REVIEW OF SYSTEMS  General:	Denies any malaise fatigue or chills. Fevers absent    Skin:No rash  	  Ophthalmologic:Denies any visual complaints,discharge redness or photophobia  	     	  Cardiovascular:	No chest pain,palpitaions or dizziness    Gastrointestinal:	NO nausea,abdominal pain or diarrhea.    Genitourinary:	No dysuria,frequency. No flank pain       Neurological:No confusion,diziness.No extremity weakness.No bladder or bowel incontinence	    Psychiatric:No delusions or hallucinations	    Hematology/Lymphatics:	No LN swelling.No gum bleeding     Endocrine:	No recent weight gain or loss.No abnormal heat/cold intolerance    Allergic/Immunologic:	No hives or rash   Allergies    No Known Allergies    Intolerances    penicillin (Stomach Upset)      Antimicrobials:    piperacillin/tazobactam IVPB.. 3.375 Gram(s) IV Intermittent every 8 hours  vancomycin  IVPB 1000 milliGRAM(s) IV Intermittent every 12 hours        Vital Signs Last 24 Hrs  T(C): 37.1 (05 Aug 2019 13:24), Max: 37.2 (04 Aug 2019 16:24)  T(F): 98.8 (05 Aug 2019 13:24), Max: 99 (04 Aug 2019 16:24)  HR: 79 (05 Aug 2019 13:24) (73 - 93)  BP: 134/70 (05 Aug 2019 13:24) (106/69 - 154/80)  BP(mean): --  RR: 18 (05 Aug 2019 13:24) (18 - 20)  SpO2: 96% (05 Aug 2019 13:24) (93% - 97%)    PHYSICAL EXAM:Pleasant patient in no acute distress.           cachexia     Eyes:PERRL EOMI.NO discharge or conjunctival injection    ENMT:No sinus tenderness.No thrush.No pharyngeal exudate or erythema.Fair dental hygiene    Neck:Supple,No LN,no JVD      Respiratory:Good air entry bilaterally,CTA    Cardiovascular:S1 S2 wnl, No murmurs,rub or gallops    Gastrointestinal:Soft BS(+) no tenderness no masses ,No rebound or guarding    Genitourinary:No CVA tendereness     Rectal:    Extremities:N swollen wrist mostly on left  right less so  not tender or red.  Vascular:peripheral pulses felt         Skin:No rash  no cellulitis                                     10.6   9.3   )-----------( 412      ( 05 Aug 2019 07:02 )             32.5         08-05    133<L>  |  92<L>  |  10  ----------------------------<  95  3.3<L>   |  26  |  0.48<L>    Ca    8.8      05 Aug 2019 07:01  Phos  3.3     08-05  Mg     1.5     08-05    TPro  7.0  /  Alb  2.9<L>  /  TBili  0.4  /  DBili  x   /  AST  46<H>  /  ALT  64<H>  /  AlkPhos  82  08-04      RECENT CULTURES:  08-04 @ 22:40  .Body Fluid  --  --  --  --  --  08-04 @ 16:36  .Urine  --  --  --    >100,000 CFU/ml Gram Negative Rods  --      MICROBIOLOGY:  Culture Results:   >100,000 CFU/ml Gram Negative Rods (08-04 @ 16:36)          Radiology:      Assessment:        Recommendations and Plan:    Pager [phone]  After 5 pm/weekends or if no response :[phone]

## 2019-08-05 NOTE — CONSULT NOTE ADULT - SUBJECTIVE AND OBJECTIVE BOX
PEDRO BYRD  [id number]    HISTORY OF PRESENT ILLNESS:    89yo F with PMHx of recent admission for right wrist swelling treated for possible septic arthritis with antibiotics and for CPPD crystal disease with steroid taper. after discharge her symptoms of pain and swelling significantly improved but two days after admission the patient developed b/l wrist discomfort that progressed to pain along with left wrist edema, erythema and intense pain. as per daughter reports the symptoms were limited to the hands and denied presence of fevers, chills, n/v/d, CP or SOB. Also denies urinary symptoms.     As part of her evaluation she had a arthrocentesis of the left wrist by plastics revealing >100,000 WBC with 65% bernarda, with a negative gram stain and positive for CPPD crystal on fluid analysis. rheumatology was consulted today for evaluation of possible septic arthritis. During her last admission she was treated empirically for septic arthritis and CPPD disease after she presented with similar symptoms and findings.      PAST MEDICAL & SURGICAL HISTORY:  Wrist fracture  Hip fracture  HTN (Hypertension)  Cerebrovascular Accident (Stroke)  Cataract  Status Post Small Bowel Resection  C Section  S/P Exploratory Laparotomy        MEDICATIONS  (STANDING):  colchicine 1.2 milliGRAM(s) Oral once  heparin  Injectable 5000 Unit(s) SubCutaneous every 8 hours  piperacillin/tazobactam IVPB.. 3.375 Gram(s) IV Intermittent every 8 hours  prednisoLONE acetate 1% Suspension 1 Drop(s) Right EYE two times a day  vancomycin  IVPB 1000 milliGRAM(s) IV Intermittent every 12 hours    MEDICATIONS  (PRN):  acetaminophen   Tablet .. 975 milliGRAM(s) Oral every 6 hours PRN Mild Pain (1 - 3), Moderate Pain (4 - 6)  traZODone 50 milliGRAM(s) Oral daily PRN agitation      Allergies    No Known Allergies    Intolerances    penicillin (Stomach Upset)      PERTINENT MEDICATION HISTORY:    SOCIAL HISTORY: no toxic habits    FAMILY HISTORY:  Paternal family history of hypertension      Vital Signs Last 24 Hrs  T(C): 37.1 (05 Aug 2019 13:24), Max: 37.1 (05 Aug 2019 13:24)  T(F): 98.8 (05 Aug 2019 13:24), Max: 98.8 (05 Aug 2019 13:24)  HR: 79 (05 Aug 2019 13:24) (73 - 93)  BP: 134/70 (05 Aug 2019 13:24) (134/70 - 154/80)  BP(mean): --  RR: 18 (05 Aug 2019 13:24) (18 - 18)  SpO2: 96% (05 Aug 2019 13:24) (93% - 96%)    Physical Exam:  General: No apparent distress  HEENT: EOMI, MMM  CVS: +S1/S2, RRR, no m/r/g  Resp: CTA b/l anteriorly  GI: Soft, NT/ND +BS  MSK: significant asymmetry of the hands due to swelling of the left wrist with surrounding erythema. minimal tenderness to palpation and mild decreased ROM. intact sensation and normal pulse.   Neuro: AOx1 (self), no evident motor deficit on exam      LABS:                        10.6   9.3   )-----------( 412      ( 05 Aug 2019 07:02 )             32.5     08-05    133<L>  |  92<L>  |  10  ----------------------------<  95  3.3<L>   |  26  |  0.48<L>    Ca    8.8      05 Aug 2019 07:01  Phos  3.3     08-05  Mg     1.5     08-05    TPro  7.0  /  Alb  2.9<L>  /  TBili  0.4  /  DBili  x   /  AST  46<H>  /  ALT  64<H>  /  AlkPhos  82  08-04    PT/INR - ( 04 Aug 2019 13:18 )   PT: 18.3 sec;   INR: 1.57 ratio         PTT - ( 04 Aug 2019 13:18 )  PTT:25.7 sec  Urinalysis Basic - ( 04 Aug 2019 14:18 )    Color: Yellow / Appearance: Slightly Turbid / S.010 / pH: x  Gluc: x / Ketone: Negative  / Bili: Negative / Urobili: Negative   Blood: x / Protein: Trace / Nitrite: Negative   Leuk Esterase: Large / RBC: 7 /hpf /  /HPF   Sq Epi: x / Non Sq Epi: 0 /hpf / Bacteria: Many        RADIOLOGY & ADDITIONAL STUDIES:      FINDINGS:    Left hand:  Soft tissue tissue swelling over the dorsal aspect of the left wrist. No   radiopaque foreign body or tracking subcutaneous collection. No cortical   erosion or periosteal reaction. No acute fracture. No dislocation.   Chronic fracture deformity of the left distal radius. Moderate   radiocarpal, basal joint and metacarpophalangeal joint arthrosis.   Redemonstrated advanced SLAC arthropathy.     Right hand:  No acute fracture or dislocation. Widening of the scapholunate joint   suggestive of advanced SLAC arthropathy. Moderate basal joint and STT   joint arthrosis. Moderate narrowing of the metacarpophalangeal joint   spaces. A peripheral intravenous catheter overlies the dorsal wrist.    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  Soft tissue   swelling over the dorsal aspect of the left wrist. No radiographic   evidence for osteomyelitis. If continued suspicion for osteomyelitis,   bone scan or MRI is needed. PEDRO BYRD  [id number]    HISTORY OF PRESENT ILLNESS:    89yo F with PMHx of recent admission for right wrist swelling treated for possible septic arthritis with antibiotics.  During that admission, patient noted to also develop right elbow pain, swelling and heat.   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  She was discharged and her symptoms of pain and swelling significantly improved but two days after admission the patient developed b/l wrist discomfort that progressed to pain along with left wrist edema, erythema and intense pain.  As per daughter reports the symptoms were limited to the hands and denied presence of fevers, chills, n/v/d, CP or SOB. Also denies urinary symptoms. As part of her evaluation this admission, she had a arthrocentesis of the left wrist by plastics revealing >100,000 WBC with 65% bernarda, with a negative gram stain and positive for CPPD crystal on fluid analysis. Rheumatology was consulted today for evaluation of possible septic arthritis.    Currently patient denies pain in the joint    PAST MEDICAL & SURGICAL HISTORY:  Wrist fracture  Hip fracture  HTN (Hypertension)  Cerebrovascular Accident (Stroke)  Cataract  Status Post Small Bowel Resection  C Section  S/P Exploratory Laparotomy    MEDICATIONS  (STANDING):  colchicine 1.2 milliGRAM(s) Oral once  heparin  Injectable 5000 Unit(s) SubCutaneous every 8 hours  piperacillin/tazobactam IVPB.. 3.375 Gram(s) IV Intermittent every 8 hours  prednisoLONE acetate 1% Suspension 1 Drop(s) Right EYE two times a day  vancomycin  IVPB 1000 milliGRAM(s) IV Intermittent every 12 hours    MEDICATIONS  (PRN):  acetaminophen   Tablet .. 975 milliGRAM(s) Oral every 6 hours PRN Mild Pain (1 - 3), Moderate Pain (4 - 6)  traZODone 50 milliGRAM(s) Oral daily PRN agitation      Allergies    No Known Allergies    Intolerances    penicillin (Stomach Upset)      PERTINENT MEDICATION HISTORY:    SOCIAL HISTORY: no toxic habits    FAMILY HISTORY:  Paternal family history of hypertension      Vital Signs Last 24 Hrs  T(C): 37.1 (05 Aug 2019 13:24), Max: 37.1 (05 Aug 2019 13:24)  T(F): 98.8 (05 Aug 2019 13:24), Max: 98.8 (05 Aug 2019 13:24)  HR: 79 (05 Aug 2019 13:24) (73 - 93)  BP: 134/70 (05 Aug 2019 13:24) (134/70 - 154/80)  BP(mean): --  RR: 18 (05 Aug 2019 13:24) (18 - 18)  SpO2: 96% (05 Aug 2019 13:24) (93% - 96%)    Physical Exam:  General: No apparent distress  HEENT: EOMI, MMM  CVS: +S1/S2, RRR, no m/r/g  Resp: CTA b/l anteriorly  GI: Soft, NT/ND +BS  MSK: left wrist erythema, effusion and warmth. minimal tenderness to palpation and mild decreased ROM to 10 degrees flex/ext. intact sensation and normal pulse. CMC squaring  Neuro: AOx1 (self), no evident motor deficit on exam      LABS:                        10.6   9.3   )-----------( 412      ( 05 Aug 2019 07:02 )             32.5     08-05    133<L>  |  92<L>  |  10  ----------------------------<  95  3.3<L>   |  26  |  0.48<L>    Ca    8.8      05 Aug 2019 07:01  Phos  3.3     08-05  Mg     1.5     08-05    TPro  7.0  /  Alb  2.9<L>  /  TBili  0.4  /  DBili  x   /  AST  46<H>  /  ALT  64<H>  /  AlkPhos  82  08-04    PT/INR - ( 04 Aug 2019 13:18 )   PT: 18.3 sec;   INR: 1.57 ratio         PTT - ( 04 Aug 2019 13:18 )  PTT:25.7 sec  Urinalysis Basic - ( 04 Aug 2019 14:18 )    Color: Yellow / Appearance: Slightly Turbid / S.010 / pH: x  Gluc: x / Ketone: Negative  / Bili: Negative / Urobili: Negative   Blood: x / Protein: Trace / Nitrite: Negative   Leuk Esterase: Large / RBC: 7 /hpf /  /HPF   Sq Epi: x / Non Sq Epi: 0 /hpf / Bacteria: Many        RADIOLOGY & ADDITIONAL STUDIES:      FINDINGS:    Left hand:  Soft tissue tissue swelling over the dorsal aspect of the left wrist. No   radiopaque foreign body or tracking subcutaneous collection. No cortical   erosion or periosteal reaction. No acute fracture. No dislocation.   Chronic fracture deformity of the left distal radius. Moderate   radiocarpal, basal joint and metacarpophalangeal joint arthrosis.   Redemonstrated advanced SLAC arthropathy.     Right hand:  No acute fracture or dislocation. Widening of the scapholunate joint   suggestive of advanced SLAC arthropathy. Moderate basal joint and STT   joint arthrosis. Moderate narrowing of the metacarpophalangeal joint   spaces. A peripheral intravenous catheter overlies the dorsal wrist.    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  Soft tissue   swelling over the dorsal aspect of the left wrist. No radiographic   evidence for osteomyelitis. If continued suspicion for osteomyelitis,   bone scan or MRI is needed.

## 2019-08-05 NOTE — PROGRESS NOTE ADULT - PROBLEM SELECTOR PLAN 4
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

## 2019-08-05 NOTE — PROGRESS NOTE ADULT - SUBJECTIVE AND OBJECTIVE BOX
THIS NOTE IS INCOMPLETE    Dru Manrique MD  Beeper: [phone]    Subjective:    Patient is a 89 year old female who presents with a chief complaint of wrist swelling.    Overnight events: No acute events overnight. Patient was mildly agitated today morning.    Patient seen by plastics yesterday who did joint aspiration, recommended rheumatology consult and NSAIDs. Patient has received vancomycin + piperacillin/tazobactam. Patient states that she has no pain today, and states that she is in the hospital for her eyes. Will speak to daughter later.    MEDICATIONS  (STANDING):  heparin  Injectable 5000 Unit(s) SubCutaneous every 8 hours  piperacillin/tazobactam IVPB.. 3.375 Gram(s) IV Intermittent every 8 hours  prednisoLONE acetate 1% Suspension 1 Drop(s) Right EYE two times a day  vancomycin  IVPB 1000 milliGRAM(s) IV Intermittent every 12 hours    MEDICATIONS  (PRN):  traZODone 50 milliGRAM(s) Oral daily PRN agitation    Objective:    Vitals:   T(F): 98.1 (19 @ 05:15), Max: 101.9 (19 @ 13:43)  HR: 93 (19 @ 05:15) (73 - 93)  BP: 154/77 (19 @ 05:15) (106/69 - 154/80)  RR: 18 (19 @ 05:15) (18 - 20)  SpO2: 94% (19 @ 05:15) (93% - 97%)                PHYSICAL EXAM:  GENERAL: NAD, well-groomed, well-developed  HEAD:  atraumatic and normocephalic  ENMT: moist mucous membranes  CHEST/LUNG: clear to auscultation bilaterally; no rales, rhonchi, wheezing, or rubs  HEART: irregular rhythm, normal rate; no murmurs, rubs, or gallops appreciated  ABDOMEN: declined exam  EXTREMITIES:  2+ peripheral pulses, no lower extremity edema  NERVOUS SYSTEM:  alert and oriented to name and place     LABS:    133<L>  |  92<L>  |  10  ----------------------------<  95  3.3<L>   |  26  |  0.48<L>    Ca    8.8      05 Aug 2019 07:01  Phos  3.3     08-05  Mg     1.5     08-    TPro  7.0  /  Alb  2.9<L>  /  TBili  0.4  /  DBili  x   /  AST  46<H>  /  ALT  64<H>  /  AlkPhos  82  -  PT/INR - ( 04 Aug 2019 13:18 )   PT: 18.3 sec;   INR: 1.57 ratio    PTT - ( 04 Aug 2019 13:18 )  PTT:25.7 sec   8/5 lactate 1.0               Urinalysis Basic - ( 04 Aug 2019 14:18 )  Color: Yellow / Appearance: Slightly Turbid / S.010 / pH: x  Gluc: x / Ketone: Negative  / Bili: Negative / Urobili: Negative   Blood: x / Protein: Trace / Nitrite: Negative   Leuk Esterase: Large / RBC: 7 /hpf /  /HPF   Sq Epi: x / Non Sq Epi: 0 /hpf / Bacteria: Many                        10.6   9.3   )-----------( 412      ( 05 Aug 2019 07:02 )             32.5              Synovial Fluid:  Pink, turbid, with 218816 nucleated cells, 55986 RBCs, 65% segmented granulocytes, 1% BF lymphocytes, 34% monocyte/macrophage, with rare calcium pyrophosphate crystals.  No organisms seen on Gram stain.    RECENT CULTURES:   @ 22:40  Synovial Fluid  --  --  --  --  --    RADIOLOGY & ADDITIONAL TESTS:    3-view x-ray of hands, bilateral  FINDINGS:  Left hand:  Soft tissue tissue swelling over the dorsal aspect of the left wrist. No   radiopaque foreign body or tracking subcutaneous collection. No cortical   erosion or periosteal reaction. No acute fracture. No dislocation.   Chronic fracture deformity of the left distal radius. Moderate   radiocarpal, basal joint and metacarpophalangeal joint arthrosis.   Redemonstrated advanced SLAC arthropathy.     Right hand:  No acute fracture or dislocation. Widening of the scapholunate joint   suggestive of advanced SLAC arthropathy. Moderate basal joint and STT   joint arthrosis. Moderate narrowing of the metacarpophalangeal joint   spaces. A peripheral intravenous catheter overlies the dorsal wrist.    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  Soft tissue   swelling over the dorsal aspect of the left wrist. No radiographic   evidence for osteomyelitis. If continued suspicion for osteomyelitis,   bone scan or MRI is needed.    Portable chest x-ray  FINDINGS:  The lungs are clear. No pleural effusion or pneumothorax.  Heart size is within normal limits.    IMPRESSION:   Clear lungs THIS NOTE IS INCOMPLETE    Dru Manrique MD  Beeper: [phone]    Subjective:    Patient is a 89 year old female who presents with a chief complaint of wrist swelling.    Overnight events: No acute events overnight. Patient was mildly agitated today morning.    Patient seen by plastics yesterday who did joint aspiration, recommended rheumatology consult and NSAIDs. Patient has received vancomycin + piperacillin/tazobactam. Patient states that she has no pain today, and states that she is in the hospital for her eyes. Will speak to daughter later.    MEDICATIONS  (STANDING):  heparin  Injectable 5000 Unit(s) SubCutaneous every 8 hours  piperacillin/tazobactam IVPB.. 3.375 Gram(s) IV Intermittent every 8 hours  prednisoLONE acetate 1% Suspension 1 Drop(s) Right EYE two times a day  vancomycin  IVPB 1000 milliGRAM(s) IV Intermittent every 12 hours    MEDICATIONS  (PRN):  traZODone 50 milliGRAM(s) Oral daily PRN agitation    Objective:    Vitals:   T(F): 98.1 (19 @ 05:15), Max: 101.9 (19 @ 13:43)  HR: 93 (19 @ 05:15) (73 - 93)  BP: 154/77 (19 @ 05:15) (106/69 - 154/80)  RR: 18 (19 @ 05:15) (18 - 20)  SpO2: 94% (19 @ 05:15) (93% - 97%)                PHYSICAL EXAM:  GENERAL: NAD, well-groomed, well-developed  HEAD:  atraumatic and normocephalic  ENMT: moist mucous membranes  CHEST/LUNG: clear to auscultation bilaterally; no rales, rhonchi, wheezing, or rubs  HEART: irregular rhythm, normal rate; no murmurs, rubs, or gallops appreciated  ABDOMEN: declined exam  EXTREMITIES:  2+ peripheral pulses, no lower extremity edema, swollen node on left wrist on dorsal aspect, more mild swelling on right wrists  NERVOUS SYSTEM:  alert and oriented to name and place     LABS:    133<L>  |  92<L>  |  10  ----------------------------<  95  3.3<L>   |  26  |  0.48<L>    Ca    8.8      05 Aug 2019 07:01  Phos  3.3     08-  Mg     1.5     -    TPro  7.0  /  Alb  2.9<L>  /  TBili  0.4  /  DBili  x   /  AST  46<H>  /  ALT  64<H>  /  AlkPhos  82  -  PT/INR - ( 04 Aug 2019 13:18 )   PT: 18.3 sec;   INR: 1.57 ratio    PTT - ( 04 Aug 2019 13:18 )  PTT:25.7 sec   8/5 lactate 1.0               Urinalysis Basic - ( 04 Aug 2019 14:18 )  Color: Yellow / Appearance: Slightly Turbid / S.010 / pH: x  Gluc: x / Ketone: Negative  / Bili: Negative / Urobili: Negative   Blood: x / Protein: Trace / Nitrite: Negative   Leuk Esterase: Large / RBC: 7 /hpf /  /HPF   Sq Epi: x / Non Sq Epi: 0 /hpf / Bacteria: Many                        10.6   9.3   )-----------( 412      ( 05 Aug 2019 07:02 )             32.5              Synovial Fluid:  Pink, turbid, with 766988 nucleated cells, 54135 RBCs, 65% segmented granulocytes, 1% BF lymphocytes, 34% monocyte/macrophage, with rare calcium pyrophosphate crystals.  No organisms seen on Gram stain.    RECENT CULTURES:   @ 22:40  Synovial Fluid  --  --  --  --  --    RADIOLOGY & ADDITIONAL TESTS:    3-view x-ray of hands, bilateral  FINDINGS:  Left hand:  Soft tissue tissue swelling over the dorsal aspect of the left wrist. No   radiopaque foreign body or tracking subcutaneous collection. No cortical   erosion or periosteal reaction. No acute fracture. No dislocation.   Chronic fracture deformity of the left distal radius. Moderate   radiocarpal, basal joint and metacarpophalangeal joint arthrosis.   Redemonstrated advanced SLAC arthropathy.     Right hand:  No acute fracture or dislocation. Widening of the scapholunate joint   suggestive of advanced SLAC arthropathy. Moderate basal joint and STT   joint arthrosis. Moderate narrowing of the metacarpophalangeal joint   spaces. A peripheral intravenous catheter overlies the dorsal wrist.    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  Soft tissue   swelling over the dorsal aspect of the left wrist. No radiographic   evidence for osteomyelitis. If continued suspicion for osteomyelitis,   bone scan or MRI is needed.    Portable chest x-ray  FINDINGS:  The lungs are clear. No pleural effusion or pneumothorax.  Heart size is within normal limits.    IMPRESSION:   Clear lungs THIS NOTE IS INCOMPLETE    Dru Manrique MD  Beeper: [phone]    Subjective:    Patient is a 89 year old female who presents with a chief complaint of wrist swelling.    Overnight events: No acute events overnight. Patient was mildly agitated today morning.    Patient seen by plastics yesterday who did joint aspiration, recommended rheumatology consult and NSAIDs. Patient has received vancomycin + piperacillin/tazobactam. Patient states that she has no pain today, and states that she is in the hospital for her eyes.     MEDICATIONS  (STANDING):  heparin  Injectable 5000 Unit(s) SubCutaneous every 8 hours  piperacillin/tazobactam IVPB.. 3.375 Gram(s) IV Intermittent every 8 hours  prednisoLONE acetate 1% Suspension 1 Drop(s) Right EYE two times a day  vancomycin  IVPB 1000 milliGRAM(s) IV Intermittent every 12 hours    MEDICATIONS  (PRN):  traZODone 50 milliGRAM(s) Oral daily PRN agitation    Objective:    Vitals:   T(F): 98.1 (19 @ 05:15), Max: 101.9 (19 @ 13:43)  HR: 93 (19 @ 05:15) (73 - 93)  BP: 154/77 (19 @ 05:15) (106/69 - 154/80)  RR: 18 (19 @ 05:15) (18 - 20)  SpO2: 94% (19 @ 05:15) (93% - 97%)                PHYSICAL EXAM:  GENERAL: NAD, well-groomed, well-developed  HEAD:  atraumatic and normocephalic  ENMT: moist mucous membranes  CHEST/LUNG: clear to auscultation bilaterally; no rales, rhonchi, wheezing, or rubs  HEART: irregular rhythm, normal rate; no murmurs, rubs, or gallops appreciated  ABDOMEN: declined exam  EXTREMITIES:  2+ peripheral pulses, no lower extremity edema, swollen node on left wrist on dorsal aspect, more mild swelling on right wrists  NERVOUS SYSTEM:  alert and oriented to name and place     LABS:    133<L>  |  92<L>  |  10  ----------------------------<  95  3.3<L>   |  26  |  0.48<L>    Ca    8.8      05 Aug 2019 07:01  Phos  3.3       Mg     1.5     -    TPro  7.0  /  Alb  2.9<L>  /  TBili  0.4  /  DBili  x   /  AST  46<H>  /  ALT  64<H>  /  AlkPhos  82  08-  PT/INR - ( 04 Aug 2019 13:18 )   PT: 18.3 sec;   INR: 1.57 ratio    PTT - ( 04 Aug 2019 13:18 )  PTT:25.7 sec   8/5 lactate 1.0               Urinalysis Basic - ( 04 Aug 2019 14:18 )  Color: Yellow / Appearance: Slightly Turbid / S.010 / pH: x  Gluc: x / Ketone: Negative  / Bili: Negative / Urobili: Negative   Blood: x / Protein: Trace / Nitrite: Negative   Leuk Esterase: Large / RBC: 7 /hpf /  /HPF   Sq Epi: x / Non Sq Epi: 0 /hpf / Bacteria: Many                        10.6   9.3   )-----------( 412      ( 05 Aug 2019 07:02 )             32.5              Synovial Fluid:  Pink, turbid, with 355536 nucleated cells, 48271 RBCs, 65% segmented granulocytes, 1% BF lymphocytes, 34% monocyte/macrophage, with rare calcium pyrophosphate crystals.  No organisms seen on Gram stain.    RECENT CULTURES:   @ 22:40  Synovial Fluid  --  --  --  --  --    RADIOLOGY & ADDITIONAL TESTS:    3-view x-ray of hands, bilateral  FINDINGS:  Left hand:  Soft tissue tissue swelling over the dorsal aspect of the left wrist. No   radiopaque foreign body or tracking subcutaneous collection. No cortical   erosion or periosteal reaction. No acute fracture. No dislocation.   Chronic fracture deformity of the left distal radius. Moderate   radiocarpal, basal joint and metacarpophalangeal joint arthrosis.   Redemonstrated advanced SLAC arthropathy.     Right hand:  No acute fracture or dislocation. Widening of the scapholunate joint   suggestive of advanced SLAC arthropathy. Moderate basal joint and STT   joint arthrosis. Moderate narrowing of the metacarpophalangeal joint   spaces. A peripheral intravenous catheter overlies the dorsal wrist.    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  Soft tissue   swelling over the dorsal aspect of the left wrist. No radiographic   evidence for osteomyelitis. If continued suspicion for osteomyelitis,   bone scan or MRI is needed.    Portable chest x-ray  FINDINGS:  The lungs are clear. No pleural effusion or pneumothorax.  Heart size is within normal limits.    IMPRESSION:   Clear lungs Dru Manrique MD  Beeper: [phone]    Subjective:    Patient is a 89 year old female who presents with a chief complaint of wrist swelling.    Overnight events: No acute events overnight. Patient was mildly agitated today morning.    Patient seen by plastics yesterday who did joint aspiration, recommended rheumatology consult and NSAIDs. Patient has received vancomycin + piperacillin/tazobactam. Patient states that she has no pain today, and states that she is in the hospital for her eyes.     Spoke with daughter on phone. Will clarify goals of care when she visits the hospital later today.    MEDICATIONS  (STANDING):  heparin  Injectable 5000 Unit(s) SubCutaneous every 8 hours  piperacillin/tazobactam IVPB.. 3.375 Gram(s) IV Intermittent every 8 hours  prednisoLONE acetate 1% Suspension 1 Drop(s) Right EYE two times a day  vancomycin  IVPB 1000 milliGRAM(s) IV Intermittent every 12 hours    MEDICATIONS  (PRN):  traZODone 50 milliGRAM(s) Oral daily PRN agitation    Objective:    Vitals:   T(F): 98.1 (19 @ 05:15), Max: 101.9 (19 @ 13:43)  HR: 93 (19 @ 05:15) (73 - 93)  BP: 154/77 (19 @ 05:15) (106/69 - 154/80)  RR: 18 (19 @ 05:15) (18 - 20)  SpO2: 94% (19 @ 05:15) (93% - 97%)                PHYSICAL EXAM:  GENERAL: NAD, well-groomed, well-developed  HEAD:  atraumatic and normocephalic  ENMT: moist mucous membranes  CHEST/LUNG: clear to auscultation bilaterally; no rales, rhonchi, wheezing, or rubs  HEART: irregular rhythm, normal rate; no murmurs, rubs, or gallops appreciated  ABDOMEN: declined exam  EXTREMITIES:  2+ peripheral pulses, no lower extremity edema, swollen node on left wrist on dorsal aspect, more mild swelling on right wrists  NERVOUS SYSTEM:  alert and oriented to name and place     LABS:    133<L>  |  92<L>  |  10  ----------------------------<  95  3.3<L>   |  26  |  0.48<L>    Ca    8.8      05 Aug 2019 07:01  Phos  3.3     08-05  Mg     1.5     08-05    TPro  7.0  /  Alb  2.9<L>  /  TBili  0.4  /  DBili  x   /  AST  46<H>  /  ALT  64<H>  /  AlkPhos  82  08-04  PT/INR - ( 04 Aug 2019 13:18 )   PT: 18.3 sec;   INR: 1.57 ratio    PTT - ( 04 Aug 2019 13:18 )  PTT:25.7 sec   8/5 Lactate 1.0               Urinalysis Basic - ( 04 Aug 2019 14:18 )  Color: Yellow / Appearance: Slightly Turbid / S.010 / pH: x  Gluc: x / Ketone: Negative  / Bili: Negative / Urobili: Negative   Blood: x / Protein: Trace / Nitrite: Negative   Leuk Esterase: Large / RBC: 7 /hpf /  /HPF   Sq Epi: x / Non Sq Epi: 0 /hpf / Bacteria: Many                        10.6   9.3   )-----------( 412      ( 05 Aug 2019 07:02 )             32.5              Synovial Fluid:  Pink, turbid, with 426897 nucleated cells, 52520 RBCs, 65% segmented granulocytes, 1% BF lymphocytes, 34% monocyte/macrophage, with rare calcium pyrophosphate crystals.  No organisms seen on Gram stain.    RECENT CULTURES:   @ 22:40  Synovial Fluid  --  --  --  --  --    RADIOLOGY & ADDITIONAL TESTS:    3-view x-ray of hands, bilateral  FINDINGS:  Left hand:  Soft tissue tissue swelling over the dorsal aspect of the left wrist. No   radiopaque foreign body or tracking subcutaneous collection. No cortical   erosion or periosteal reaction. No acute fracture. No dislocation.   Chronic fracture deformity of the left distal radius. Moderate   radiocarpal, basal joint and metacarpophalangeal joint arthrosis.   Redemonstrated advanced SLAC arthropathy.     Right hand:  No acute fracture or dislocation. Widening of the scapholunate joint   suggestive of advanced SLAC arthropathy. Moderate basal joint and STT   joint arthrosis. Moderate narrowing of the metacarpophalangeal joint   spaces. A peripheral intravenous catheter overlies the dorsal wrist.    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  Soft tissue   swelling over the dorsal aspect of the left wrist. No radiographic   evidence for osteomyelitis. If continued suspicion for osteomyelitis,   bone scan or MRI is needed.    Portable chest x-ray  FINDINGS:  The lungs are clear. No pleural effusion or pneumothorax.  Heart size is within normal limits.    IMPRESSION:   Clear lungs

## 2019-08-06 ENCOUNTER — TRANSCRIPTION ENCOUNTER (OUTPATIENT)
Age: 84
End: 2019-08-06

## 2019-08-06 LAB
-  AMIKACIN: SIGNIFICANT CHANGE UP
-  AMPICILLIN/SULBACTAM: SIGNIFICANT CHANGE UP
-  AMPICILLIN: SIGNIFICANT CHANGE UP
-  AZTREONAM: SIGNIFICANT CHANGE UP
-  CEFAZOLIN: SIGNIFICANT CHANGE UP
-  CEFEPIME: SIGNIFICANT CHANGE UP
-  CEFOXITIN: SIGNIFICANT CHANGE UP
-  CEFTRIAXONE: SIGNIFICANT CHANGE UP
-  CIPROFLOXACIN: SIGNIFICANT CHANGE UP
-  ERTAPENEM: SIGNIFICANT CHANGE UP
-  GENTAMICIN: SIGNIFICANT CHANGE UP
-  IMIPENEM: SIGNIFICANT CHANGE UP
-  LEVOFLOXACIN: SIGNIFICANT CHANGE UP
-  MEROPENEM: SIGNIFICANT CHANGE UP
-  NITROFURANTOIN: SIGNIFICANT CHANGE UP
-  PIPERACILLIN/TAZOBACTAM: SIGNIFICANT CHANGE UP
-  TIGECYCLINE: SIGNIFICANT CHANGE UP
-  TOBRAMYCIN: SIGNIFICANT CHANGE UP
-  TRIMETHOPRIM/SULFAMETHOXAZOLE: SIGNIFICANT CHANGE UP
ANION GAP SERPL CALC-SCNC: 12 MMOL/L — SIGNIFICANT CHANGE UP (ref 5–17)
BASOPHILS # BLD AUTO: 0 K/UL — SIGNIFICANT CHANGE UP (ref 0–0.2)
BASOPHILS NFR BLD AUTO: 0.4 % — SIGNIFICANT CHANGE UP (ref 0–2)
BUN SERPL-MCNC: 12 MG/DL — SIGNIFICANT CHANGE UP (ref 7–23)
CALCIUM SERPL-MCNC: 8.3 MG/DL — LOW (ref 8.4–10.5)
CHLORIDE SERPL-SCNC: 89 MMOL/L — LOW (ref 96–108)
CO2 SERPL-SCNC: 28 MMOL/L — SIGNIFICANT CHANGE UP (ref 22–31)
CREAT SERPL-MCNC: 0.63 MG/DL — SIGNIFICANT CHANGE UP (ref 0.5–1.3)
CULTURE RESULTS: SIGNIFICANT CHANGE UP
EOSINOPHIL # BLD AUTO: 0.3 K/UL — SIGNIFICANT CHANGE UP (ref 0–0.5)
EOSINOPHIL NFR BLD AUTO: 3.7 % — SIGNIFICANT CHANGE UP (ref 0–6)
GLUCOSE SERPL-MCNC: 96 MG/DL — SIGNIFICANT CHANGE UP (ref 70–99)
HCT VFR BLD CALC: 31 % — LOW (ref 34.5–45)
HGB BLD-MCNC: 10.3 G/DL — LOW (ref 11.5–15.5)
LYMPHOCYTES # BLD AUTO: 1.3 K/UL — SIGNIFICANT CHANGE UP (ref 1–3.3)
LYMPHOCYTES # BLD AUTO: 16.1 % — SIGNIFICANT CHANGE UP (ref 13–44)
MCHC RBC-ENTMCNC: 30.3 PG — SIGNIFICANT CHANGE UP (ref 27–34)
MCHC RBC-ENTMCNC: 33.2 GM/DL — SIGNIFICANT CHANGE UP (ref 32–36)
MCV RBC AUTO: 91.4 FL — SIGNIFICANT CHANGE UP (ref 80–100)
METHOD TYPE: SIGNIFICANT CHANGE UP
MONOCYTES # BLD AUTO: 0.9 K/UL — SIGNIFICANT CHANGE UP (ref 0–0.9)
MONOCYTES NFR BLD AUTO: 10.9 % — SIGNIFICANT CHANGE UP (ref 2–14)
NEUTROPHILS # BLD AUTO: 5.4 K/UL — SIGNIFICANT CHANGE UP (ref 1.8–7.4)
NEUTROPHILS NFR BLD AUTO: 68.9 % — SIGNIFICANT CHANGE UP (ref 43–77)
ORGANISM # SPEC MICROSCOPIC CNT: SIGNIFICANT CHANGE UP
ORGANISM # SPEC MICROSCOPIC CNT: SIGNIFICANT CHANGE UP
PLATELET # BLD AUTO: 387 K/UL — SIGNIFICANT CHANGE UP (ref 150–400)
POTASSIUM SERPL-MCNC: 3.2 MMOL/L — LOW (ref 3.5–5.3)
POTASSIUM SERPL-SCNC: 3.2 MMOL/L — LOW (ref 3.5–5.3)
RBC # BLD: 3.39 M/UL — LOW (ref 3.8–5.2)
RBC # FLD: 12.7 % — SIGNIFICANT CHANGE UP (ref 10.3–14.5)
SODIUM SERPL-SCNC: 129 MMOL/L — LOW (ref 135–145)
SPECIMEN SOURCE: SIGNIFICANT CHANGE UP
VANCOMYCIN TROUGH SERPL-MCNC: 11.6 UG/ML — SIGNIFICANT CHANGE UP (ref 10–20)
WBC # BLD: 7.8 K/UL — SIGNIFICANT CHANGE UP (ref 3.8–10.5)
WBC # FLD AUTO: 7.8 K/UL — SIGNIFICANT CHANGE UP (ref 3.8–10.5)

## 2019-08-06 PROCEDURE — 99232 SBSQ HOSP IP/OBS MODERATE 35: CPT | Mod: GC

## 2019-08-06 PROCEDURE — 99232 SBSQ HOSP IP/OBS MODERATE 35: CPT

## 2019-08-06 PROCEDURE — 99233 SBSQ HOSP IP/OBS HIGH 50: CPT | Mod: GC

## 2019-08-06 RX ORDER — POTASSIUM CHLORIDE 20 MEQ
20 PACKET (EA) ORAL
Refills: 0 | Status: DISCONTINUED | OUTPATIENT
Start: 2019-08-06 | End: 2019-08-06

## 2019-08-06 RX ORDER — MEROPENEM 1 G/30ML
1000 INJECTION INTRAVENOUS EVERY 8 HOURS
Refills: 0 | Status: DISCONTINUED | OUTPATIENT
Start: 2019-08-06 | End: 2019-08-06

## 2019-08-06 RX ORDER — POTASSIUM CHLORIDE 20 MEQ
10 PACKET (EA) ORAL
Refills: 0 | Status: DISCONTINUED | OUTPATIENT
Start: 2019-08-06 | End: 2019-08-06

## 2019-08-06 RX ORDER — SIMVASTATIN 20 MG/1
10 TABLET, FILM COATED ORAL AT BEDTIME
Refills: 0 | Status: DISCONTINUED | OUTPATIENT
Start: 2019-08-06 | End: 2019-08-08

## 2019-08-06 RX ORDER — SODIUM CHLORIDE 9 MG/ML
1000 INJECTION, SOLUTION INTRAVENOUS
Refills: 0 | Status: DISCONTINUED | OUTPATIENT
Start: 2019-08-06 | End: 2019-08-08

## 2019-08-06 RX ORDER — MEROPENEM 1 G/30ML
1000 INJECTION INTRAVENOUS EVERY 12 HOURS
Refills: 0 | Status: DISCONTINUED | OUTPATIENT
Start: 2019-08-06 | End: 2019-08-08

## 2019-08-06 RX ADMIN — Medication 1 DROP(S): at 05:12

## 2019-08-06 RX ADMIN — HEPARIN SODIUM 5000 UNIT(S): 5000 INJECTION INTRAVENOUS; SUBCUTANEOUS at 05:12

## 2019-08-06 RX ADMIN — Medication 0.6 MILLIGRAM(S): at 12:36

## 2019-08-06 RX ADMIN — Medication 250 MILLIGRAM(S): at 05:50

## 2019-08-06 RX ADMIN — Medication 1 DROP(S): at 18:39

## 2019-08-06 RX ADMIN — SIMVASTATIN 10 MILLIGRAM(S): 20 TABLET, FILM COATED ORAL at 21:40

## 2019-08-06 RX ADMIN — SODIUM CHLORIDE 50 MILLILITER(S): 9 INJECTION, SOLUTION INTRAVENOUS at 11:59

## 2019-08-06 RX ADMIN — MEROPENEM 100 MILLIGRAM(S): 1 INJECTION INTRAVENOUS at 12:35

## 2019-08-06 RX ADMIN — PIPERACILLIN AND TAZOBACTAM 25 GRAM(S): 4; .5 INJECTION, POWDER, LYOPHILIZED, FOR SOLUTION INTRAVENOUS at 06:54

## 2019-08-06 RX ADMIN — Medication 50 MILLIGRAM(S): at 14:12

## 2019-08-06 RX ADMIN — HEPARIN SODIUM 5000 UNIT(S): 5000 INJECTION INTRAVENOUS; SUBCUTANEOUS at 21:40

## 2019-08-06 NOTE — DISCHARGE NOTE PROVIDER - PROVIDER TOKENS
PROVIDER:[TOKEN:[[id number]]] PROVIDER:[TOKEN:[[id number]]],PROVIDER:[TOKEN:[[id number]]]

## 2019-08-06 NOTE — PROGRESS NOTE ADULT - ASSESSMENT
89 yr old was hospitalized in 6/2019 for bilateral wrist pain and swelling and teated empirically for septic arthritis and pseudogout.  seemed  to resolve but readmitted with swelling mostly on left, 100k wbc in the wrist and no cellulitis  crystals are positive and NOS on gram stain  urine culture is positive for gram negative rods but denies any UTI symptoms.       It is unlikely that she has both pseudogout and septic arthritis at the same time.  It rarely  is bilateral and should not relapse with negative cultures   pseudogout can cause fever.     would dc ab and give course of steroids   no need to treat the urine culture at present   pt at high risk for renal disease  from vanco at these doses despite levels, also cdiff based on age    would dc antibiotics

## 2019-08-06 NOTE — PROGRESS NOTE ADULT - ASSESSMENT
89yo F with PMHx HTN, CVA and a recent admission for management of possible wrist septic arthritis vs CPPD disease. presented to the hospital for evaluation of two days of wrist pain, swelling and erythema mostly on the left hand. PE with significant synovitis of the left wrist along with erythema and tenderness on exam. Arthrocentesis of left wrist consistent with inflammatory arthritis with more than 100,000 cell with neutrophil predominance.     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  Arthrocentesis with cppd crystals. Gm stain thus far negative and patient afebrile with nl wbc. presentation and data thus far more consistent with pseudogout flare as opposed to infectious arthritis.  >continue with colchicine 0.6 mg daily  >will hold steroids as the patient had improve edema and minimal tenderness on exam. will continue with colchicine.  >f/u plastics recommendations  >Follow up cultures. no need for antibiotics as the presentation is more consistent with pseudogout.    Bola Aguilera Rheum Fellow I  d/w Attending Dr. Nassar 89yo F with PMHx HTN, CVA and a recent admission for management of possible wrist septic arthritis vs CPPD disease. presented to the hospital for evaluation of two days of wrist pain, swelling and erythema mostly on the left hand. PE with significant synovitis of the left wrist along with erythema and tenderness on exam. Arthrocentesis of left wrist consistent with inflammatory arthritis with more than 100,000 cell with neutrophil predominance.     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  Arthrocentesis with cppd crystals. Gm stain thus far negative and patient afebrile with nl wbc. presentation and data thus far more consistent with pseudogout flare as opposed to infectious arthritis.  >continue with colchicine 0.6 mg daily  >will hold steroids as the patient had improve edema and minimal tenderness on exam today. will continue with colchicine.  >f/u plastics recommendations  >Follow up cultures. no need for antibiotics as the presentation is more consistent with pseudogout.    Bola Aguilera Rheum Fellow I  d/w Attending Dr. Nassar

## 2019-08-06 NOTE — PROGRESS NOTE ADULT - PROBLEM SELECTOR PLAN 5
DVT ppx: Hep sc q8  Diet: regular  GOC: clarify GOC/code status with daughter (proxy) over the phone  Dispo: pending improvement of sxs  - replete potassium DVT ppx: Hep sc q8  Diet: regular  GOC: clarify GOC/code status with daughter (proxy)  Dispo: pending improvement of sxs DVT ppx: Hep sc q8  Diet: regular  GOC: clarify GOC/code status with daughter (proxy)  Dispo: pending improvement of sxs, will likely need discharge to long term care facility DVT ppx: Hep sc q8  Diet: regular  GOC: spoke with daughter, patient is full code  Dispo: pending improvement of sxs, will likely need discharge to long term care facility

## 2019-08-06 NOTE — DISCHARGE NOTE PROVIDER - HOSPITAL COURSE
89 F who w/ PMHx of HLD, mild dementia, R cataract c/b corneal injury, ?CVA, p/w several days of bilateral wrist pain accompanied by erythema, swelling in setting of fever. Admitted due to concern for septic arthritis vs. pseudogout flare. Joint aspiration showed many nucleated cells and RBCs and some calcium pyrophosphate crystals. Patient was evaluated by plastic surgery, rheumatology, and infectious diseases, and it was determined after several days of vancomycin/piperacillin/tazobactam that likely etiology of bilateral wrist pain was pseudogout flare; negative blood and synovial fluid cultures. Patient was started on colchicine and antibiotics were discontinued. However, patient was found to have ESBL Klebsiella pneumoniae in urine and was started on meropenem. Patient improved on colchicine and is stable for discharge. 89 F who w/ PMHx of HLD, mild dementia, R cataract c/b corneal injury, ?CVA, p/w several days of bilateral wrist pain accompanied by erythema, swelling in setting of fever. Admitted due to concern for septic arthritis vs. pseudogout flare. Joint aspiration showed many nucleated cells and RBCs and some calcium pyrophosphate crystals. Patient was evaluated by plastic surgery, rheumatology, and infectious diseases, and it was determined after several days of vancomycin/piperacillin/tazobactam that likely etiology of bilateral wrist pain was pseudogout flare; negative blood and synovial fluid cultures. Patient was started on colchicine and antibiotics were discontinued. However, patient was found to have ESBL Klebsiella pneumoniae in urine and was given meropenem x 3 days. Patient improved on colchicine and is stable for discharge, on colchicine, to ____________. 89 F who w/ PMHx of HLD, mild dementia, R cataract c/b corneal injury, ?CVA, p/w several days of bilateral wrist pain accompanied by erythema, swelling in setting of fever. Admitted due to concern for septic arthritis vs. pseudogout flare. Joint aspiration showed many nucleated cells and RBCs and some calcium pyrophosphate crystals. Patient was evaluated by plastic surgery, rheumatology, and infectious diseases, and it was determined after several days of vancomycin/piperacillin/tazobactam that likely etiology of bilateral wrist pain was pseudogout flare; negative blood and synovial fluid cultures. Patient was started on colchicine and antibiotics were discontinued. However, patient was found to have ESBL Klebsiella pneumoniae in urine and was given meropenem x 3 days. Patient improved on colchicine and is stable for discharge, on colchicine for one month, to ____________. 89 F who w/ PMHx of HLD, mild dementia, R cataract c/b corneal injury, ?CVA, p/w several days of bilateral wrist pain accompanied by erythema, swelling in setting of fever. Admitted due to concern for septic arthritis vs. pseudogout flare. Joint aspiration showed many nucleated cells and RBCs and some calcium pyrophosphate crystals. Patient was evaluated by plastic surgery, rheumatology, and infectious diseases, and it was determined after several days of vancomycin/piperacillin/tazobactam that likely etiology of bilateral wrist pain was pseudogout flare; negative blood and synovial fluid cultures. Patient was started on colchicine and antibiotics were discontinued. However, patient was found to have ESBL Klebsiella pneumoniae in urine and was given meropenem x 3 days. Patient improved on colchicine and is stable for discharge, on colchicine for one month, to rehab with plan to bridge to long term care facility.

## 2019-08-06 NOTE — PROGRESS NOTE ADULT - PROBLEM SELECTOR PLAN 4
- Na 126 on presentation, up to 133, then down to 129; possibly 2/2 SIADH in setting of infection or pain Na 126 on presentation, up to 133, then down to 129; possibly 2/2 SIADH in setting of infection or pain  - NS with additive KCl 20 mEq Alert and oriented to place and name.  - for agitation, reorientation and reassurance, then trazodone 50 mg qd prn

## 2019-08-06 NOTE — PROGRESS NOTE ADULT - SUBJECTIVE AND OBJECTIVE BOX
PEDRO BYRD  [id number]    INTERVAL HPI/OVERNIGHT EVENTS:    The patient continues to have mild discomfort on the wrist. decreased swelling and redness.     MEDICATIONS  (STANDING):  colchicine 0.6 milliGRAM(s) Oral daily  heparin  Injectable 5000 Unit(s) SubCutaneous every 8 hours  melatonin 5 milliGRAM(s) Oral at bedtime  meropenem  IVPB 1000 milliGRAM(s) IV Intermittent every 12 hours  prednisoLONE acetate 1% Suspension 1 Drop(s) Right EYE two times a day  simvastatin 10 milliGRAM(s) Oral at bedtime  sodium chloride 0.9% 1000 milliLiter(s) (50 mL/Hr) IV Continuous <Continuous>    MEDICATIONS  (PRN):  acetaminophen   Tablet .. 975 milliGRAM(s) Oral every 6 hours PRN Mild Pain (1 - 3), Moderate Pain (4 - 6)  traZODone 50 milliGRAM(s) Oral daily PRN agitation      Vital Signs Last 24 Hrs  T(C): 36.6 (06 Aug 2019 14:21), Max: 36.8 (05 Aug 2019 21:12)  T(F): 97.8 (06 Aug 2019 14:21), Max: 98.2 (05 Aug 2019 21:12)  HR: 67 (06 Aug 2019 14:21) (61 - 87)  BP: 130/76 (06 Aug 2019 14:21) (106/64 - 152/81)  BP(mean): --  RR: 18 (06 Aug 2019 14:21) (18 - 18)  SpO2: 97% (06 Aug 2019 14:21) (91% - 97%)    Physical Exam:  General: NAD  HEENT: EOMI, MMM  Cardio: +S1/S2, RRR  Resp: CTA b/l  GI: +BS, soft, NT/ND  MSK: less swelling and erythema over the left wrist, ROM passive or active not painful. mild TTP over the left wrist.  Neuro: Alert, no evident motor deficit      LABS:                        10.3   7.8   )-----------( 387      ( 06 Aug 2019 05:16 )             31.0     08-06    129<L>  |  89<L>  |  12  ----------------------------<  96  3.2<L>   |  28  |  0.63    Ca    8.3<L>      06 Aug 2019 05:16  Phos  3.3     08-05  Mg     1.5     08-05              RADIOLOGY & ADDITIONAL TESTS:

## 2019-08-06 NOTE — DISCHARGE NOTE PROVIDER - NSDCFUADDAPPT_GEN_ALL_CORE_FT
Please follow up with your primary care physician. Please follow up with Rheumatology, Dr. Radha Nassar, in 4-6 weeks. The office will be reaching out to you to set up this appointment.  Please follow up with your primary care physician.

## 2019-08-06 NOTE — PROGRESS NOTE ADULT - SUBJECTIVE AND OBJECTIVE BOX
Dru Manrique MD  Beeper: [phone]    Subjective:    Patient is a 89y old  Female who presents with a chief complaint of Wrist swelling (05 Aug 2019 19:05)      Overnight events:    MEDICATIONS  (STANDING):  colchicine 0.6 milliGRAM(s) Oral daily  heparin  Injectable 5000 Unit(s) SubCutaneous every 8 hours  melatonin 5 milliGRAM(s) Oral at bedtime  piperacillin/tazobactam IVPB.. 3.375 Gram(s) IV Intermittent every 8 hours  potassium chloride    Tablet ER 20 milliEquivalent(s) Oral every 2 hours  prednisoLONE acetate 1% Suspension 1 Drop(s) Right EYE two times a day  vancomycin  IVPB 1000 milliGRAM(s) IV Intermittent every 12 hours    MEDICATIONS  (PRN):  acetaminophen   Tablet .. 975 milliGRAM(s) Oral every 6 hours PRN Mild Pain (1 - 3), Moderate Pain (4 - 6)  traZODone 50 milliGRAM(s) Oral daily PRN agitation      Objective:    Vitals: Vital Signs Last 24 Hrs  T(F): 98 (19 @ 05:04), Max: 98.8 (19 @ 13:24)  HR: 61 (19 @ 05:04) (61 - 87)  BP: 106/64 (19 @ 05:04) (106/64 - 152/81)  BP(mean): --  RR: 18 (19 @ 05:04) (18 - 18)  SpO2: 97% (19 @ 05:04) (95% - 97%)              I&O's Summary    05 Aug 2019 07:01  -  06 Aug 2019 07:00  --------------------------------------------------------  IN: 0 mL / OUT: 201 mL / NET: -201 mL        PHYSICAL EXAM:  GENERAL: NAD, well-groomed, well-developed  HEAD:  Atraumatic, Normocephalic  EYES: EOMI, PERRLA, conjunctiva and sclera clear  ENMT: No tonsillar erythema, exudates, or enlargement; Moist mucous membranes, Good dentition, No lesions  NECK: Supple, No JVD, Normal thyroid  CHEST/LUNG: Clear to percussion bilaterally; No rales, rhonchi, wheezing, or rubs  HEART: Regular rate and rhythm; No murmurs, rubs, or gallops  ABDOMEN: Soft, Nontender, Nondistended; Bowel sounds present  EXTREMITIES:  2+ Peripheral Pulses, No clubbing, cyanosis, or edema  LYMPH: No lymphadenopathy noted  SKIN: No rashes or lesions  NERVOUS SYSTEM:  Alert & Oriented X3, Good concentration; Motor Strength 5/5 B/L upper and lower extremities; DTRs 2+ intact and symmetric                                             LABS:      129<L>  |  89<L>  |  12  ----------------------------<  96  3.2<L>   |  28  |  0.63  08-05    133<L>  |  92<L>  |  10  ----------------------------<  95  3.3<L>   |  26  |  0.48<L>      126<L>  |  87<L>  |  12  ----------------------------<  114<H>  4.1   |  25  |  0.45<L>    Ca    8.3<L>      06 Aug 2019 05:16  Ca    8.8      05 Aug 2019 07:01  Ca    8.9      04 Aug 2019 13:18  Phos  3.3     08-  Mg     1.5     -    TPro  7.0  /  Alb  2.9<L>  /  TBili  0.4  /  DBili  x   /  AST  46<H>  /  ALT  64<H>  /  AlkPhos  82  08-      PT/INR - ( 04 Aug 2019 13:18 )   PT: 18.3 sec;   INR: 1.57 ratio         PTT - ( 04 Aug 2019 13:18 )  PTT:25.7 sec                Urinalysis Basic - ( 04 Aug 2019 14:18 )    Color: Yellow / Appearance: Slightly Turbid / S.010 / pH: x  Gluc: x / Ketone: Negative  / Bili: Negative / Urobili: Negative   Blood: x / Protein: Trace / Nitrite: Negative   Leuk Esterase: Large / RBC: 7 /hpf /  /HPF   Sq Epi: x / Non Sq Epi: 0 /hpf / Bacteria: Many                                  10.3   7.8   )-----------( 387      ( 06 Aug 2019 05:16 )             31.0                         10.6   9.3   )-----------( 412      ( 05 Aug 2019 07:02 )             32.5                         11.0   9.4   )-----------( 421      ( 04 Aug 2019 13:18 )             33.3     CAPILLARY BLOOD GLUCOSE            RECENT CULTURES:   @ 22:40  .Body Fluid  --  --  --    No growth  --   @ 17:44  .Blood  --  --  --    No growth to date.  --   @ 16:36  .Urine  --  --  --    >100,000 CFU/ml Gram Negative Rods  --      TELEMETRY:    ECG:    TTE:    RADIOLOGY & ADDITIONAL TESTS:    Imaging Personally Reviewed:  [ ] YES  [ ] NO    Consultants involved in case:   Consultant(s) Notes Reviewed:  [ ] YES  [ ] NO:   Care Discussed with Consultants/Other Providers [ ] YES  [ ] NO THIS NOTE IS INCOMPLETE    Dru Manrique MD  Beeper: [phone]    Subjective:    Patient is a 89 year old female who presents with a chief complaint of wrist swelling.    Overnight events: Patient was agitated overnight with staff and people on phone, refused trazodone. Daughter did not come for goals of care discussion.    MEDICATIONS  (STANDING):  colchicine 0.6 milliGRAM(s) Oral daily  heparin  Injectable 5000 Unit(s) SubCutaneous every 8 hours  melatonin 5 milliGRAM(s) Oral at bedtime  piperacillin/tazobactam IVPB.. 3.375 Gram(s) IV Intermittent every 8 hours  potassium chloride    Tablet ER 20 milliEquivalent(s) Oral every 2 hours  prednisoLONE acetate 1% Suspension 1 Drop(s) Right EYE two times a day  vancomycin  IVPB 1000 milliGRAM(s) IV Intermittent every 12 hours    MEDICATIONS  (PRN):  acetaminophen   Tablet .. 975 milliGRAM(s) Oral every 6 hours PRN Mild Pain (1 - 3), Moderate Pain (4 - 6)  traZODone 50 milliGRAM(s) Oral daily PRN agitation    Objective:    Vitals:   T(F): 98 (08-06-19 @ 05:04), Max: 98.8 (08-05-19 @ 13:24)  HR: 61 (08-06-19 @ 05:04) (61 - 87)  BP: 106/64 (08-06-19 @ 05:04) (106/64 - 152/81)  RR: 18 (08-06-19 @ 05:04) (18 - 18)  SpO2: 97% (08-06-19 @ 05:04) (95% - 97%)                I&O's Summary    05 Aug 2019 07:01  -  06 Aug 2019 07:00  --------------------------------------------------------  IN: 0 mL / OUT: 201 mL / NET: -201 mL    PHYSICAL EXAM:  GENERAL: NAD, well-groomed, well-developed  HEAD:  atraumatic and normocephalic  ENMT: moist mucous membranes  CHEST/LUNG:   HEART:   ABDOMEN:   EXTREMITIES:  peripheral pulses, no LE edema, hands?  NERVOUS SYSTEM:  Alert & Oriented x                                       LABS:  08-06    129<L>  |  89<L>  |  12  ----------------------------<  96  3.2<L>   |  28  |  0.63 (from 0.48)    Ca    8.3<L>      06 Aug 2019 05:16               10.3   7.8   )-----------( 387      ( 06 Aug 2019 05:16 )             31.0              RECENT CULTURES:  08-04 @ 22:40  .Body Fluid  --  --  --    No growth    08-04 @ 17:44  .Blood  --  --  --    No growth to date. THIS NOTE IS INCOMPLETE    Dru Manrique MD  Beeper: [phone]    Subjective:    Patient is a 89 year old female who presents with a chief complaint of wrist swelling.    Overnight events: Patient was agitated overnight with staff and people on phone, refused trazodone. Daughter did not come for goals of care discussion.    MEDICATIONS  (STANDING):  colchicine 0.6 milliGRAM(s) Oral daily  heparin  Injectable 5000 Unit(s) SubCutaneous every 8 hours  melatonin 5 milliGRAM(s) Oral at bedtime  piperacillin/tazobactam IVPB.. 3.375 Gram(s) IV Intermittent every 8 hours  potassium chloride    Tablet ER 20 milliEquivalent(s) Oral every 2 hours  prednisoLONE acetate 1% Suspension 1 Drop(s) Right EYE two times a day  vancomycin  IVPB 1000 milliGRAM(s) IV Intermittent every 12 hours    MEDICATIONS  (PRN):  acetaminophen   Tablet .. 975 milliGRAM(s) Oral every 6 hours PRN Mild Pain (1 - 3), Moderate Pain (4 - 6)  traZODone 50 milliGRAM(s) Oral daily PRN agitation    Objective:    Vitals:   T(F): 98 (08-06-19 @ 05:04), Max: 98.8 (08-05-19 @ 13:24)  HR: 61 (08-06-19 @ 05:04) (61 - 87)  BP: 106/64 (08-06-19 @ 05:04) (106/64 - 152/81)  RR: 18 (08-06-19 @ 05:04) (18 - 18)  SpO2: 97% (08-06-19 @ 05:04) (95% - 97%)                I&O's Summary    05 Aug 2019 07:01  -  06 Aug 2019 07:00  --------------------------------------------------------  IN: 0 mL / OUT: 201 mL / NET: -201 mL    PHYSICAL EXAM:  GENERAL: NAD, well-groomed, well-developed  HEAD:  atraumatic and normocephalic  ENMT: moist mucous membranes  CHEST/LUNG:  clear to auscultation bilaterally  HEART: regular rate and rhythm, no murmurs, rubs, gallops  EXTREMITIES:  peripheral pulses, no LE edema, warmth and swelling in left hand on dorsal surface, thumb side; lesser warmth and swelling in right hand  NERVOUS SYSTEM:  alert and oriented to place and name                                       LABS:  08-06    129<L>  |  89<L>  |  12  ----------------------------<  96  3.2<L>   |  28  |  0.63 (from 0.48)    Ca    8.3<L>      06 Aug 2019 05:16               10.3   7.8   )-----------( 387      ( 06 Aug 2019 05:16 )             31.0              RECENT CULTURES:  08-04 @ 22:40  .Body Fluid  --  --  --    No growth    08-04 @ 17:44  .Blood  --  --  --    No growth to date. THIS NOTE IS INCOMPLETE    Dru Manrique MD  Beeper: [phone]    Subjective:    Patient is a 89 year old female who presents with a chief complaint of wrist swelling.    Overnight events: Patient was agitated overnight with staff and people on phone, refused trazodone. Daughter did not come for goals of care discussion.    Patient feels good today, was curious about what the day of the week and the time are. She was hungry and wanted to see the menu. She at first thought she was at home and was going to go shopping in [place] today but quickly realized that she was in [facility]. She states that she has no pain in her hands. No shortness of breath or chest pain. States that she feels cold.     MEDICATIONS  (STANDING):  colchicine 0.6 milliGRAM(s) Oral daily  heparin  Injectable 5000 Unit(s) SubCutaneous every 8 hours  melatonin 5 milliGRAM(s) Oral at bedtime  piperacillin/tazobactam IVPB.. 3.375 Gram(s) IV Intermittent every 8 hours  potassium chloride    Tablet ER 20 milliEquivalent(s) Oral every 2 hours  prednisoLONE acetate 1% Suspension 1 Drop(s) Right EYE two times a day  vancomycin  IVPB 1000 milliGRAM(s) IV Intermittent every 12 hours    MEDICATIONS  (PRN):  acetaminophen   Tablet .. 975 milliGRAM(s) Oral every 6 hours PRN Mild Pain (1 - 3), Moderate Pain (4 - 6)  traZODone 50 milliGRAM(s) Oral daily PRN agitation    Objective:    Vitals:   T(F): 98 (08-06-19 @ 05:04), Max: 98.8 (08-05-19 @ 13:24)  HR: 61 (08-06-19 @ 05:04) (61 - 87)  BP: 106/64 (08-06-19 @ 05:04) (106/64 - 152/81)  RR: 18 (08-06-19 @ 05:04) (18 - 18)  SpO2: 97% (08-06-19 @ 05:04) (95% - 97%)                I&O's Summary    05 Aug 2019 07:01  -  06 Aug 2019 07:00  --------------------------------------------------------  IN: 0 mL / OUT: 201 mL / NET: -201 mL    PHYSICAL EXAM:  GENERAL: NAD, well-groomed, well-developed  HEAD:  atraumatic and normocephalic  ENMT: moist mucous membranes  CHEST/LUNG:  clear to auscultation bilaterally  HEART: regular rate and rhythm, no murmurs, rubs, gallops  EXTREMITIES:  peripheral pulses, no LE edema, warmth and swelling in left hand on dorsal surface, thumb side; lesser warmth and swelling in right hand; no pain on palpation bilaterally  NERVOUS SYSTEM:  alert and oriented to place and name                                       LABS:  08-06    129<L>  |  89<L>  |  12  ----------------------------<  96  3.2<L>   |  28  |  0.63 (from 0.48)    Ca    8.3<L>      06 Aug 2019 05:16               10.3   7.8   )-----------( 387      ( 06 Aug 2019 05:16 )             31.0              RECENT CULTURES:  08-04 @ 22:40  .Body Fluid  --  --  --    No growth    08-04 @ 17:44  .Blood  --  --  --    No growth to date. Dru Manrique MD  Beeper: [phone]    Subjective:    Patient is a 89 year old female who presents with a chief complaint of wrist swelling.    Overnight events: Patient was agitated overnight with staff and people on phone, refused trazodone. Daughter did not come for goals of care discussion.    Patient feels good today, was curious about what the day of the week and the time are. She was hungry and wanted to see the menu. She at first thought she was at home and was going to go shopping in [place] today but quickly realized that she was in [facility]. She states that she has no pain in her hands. No shortness of breath or chest pain. States that she feels cold.     MEDICATIONS  (STANDING):  colchicine 0.6 milliGRAM(s) Oral daily  heparin  Injectable 5000 Unit(s) SubCutaneous every 8 hours  melatonin 5 milliGRAM(s) Oral at bedtime  piperacillin/tazobactam IVPB.. 3.375 Gram(s) IV Intermittent every 8 hours  potassium chloride    Tablet ER 20 milliEquivalent(s) Oral every 2 hours  prednisoLONE acetate 1% Suspension 1 Drop(s) Right EYE two times a day  vancomycin  IVPB 1000 milliGRAM(s) IV Intermittent every 12 hours    MEDICATIONS  (PRN):  acetaminophen   Tablet .. 975 milliGRAM(s) Oral every 6 hours PRN Mild Pain (1 - 3), Moderate Pain (4 - 6)  traZODone 50 milliGRAM(s) Oral daily PRN agitation    Objective:    Vitals:   T(F): 98 (08-06-19 @ 05:04), Max: 98.8 (08-05-19 @ 13:24)  HR: 61 (08-06-19 @ 05:04) (61 - 87)  BP: 106/64 (08-06-19 @ 05:04) (106/64 - 152/81)  RR: 18 (08-06-19 @ 05:04) (18 - 18)  SpO2: 97% (08-06-19 @ 05:04) (95% - 97%)                I&O's Summary    05 Aug 2019 07:01  -  06 Aug 2019 07:00  --------------------------------------------------------  IN: 0 mL / OUT: 201 mL / NET: -201 mL    PHYSICAL EXAM:  GENERAL: NAD, well-groomed, well-developed  HEAD:  atraumatic and normocephalic  ENMT: moist mucous membranes  CHEST/LUNG:  clear to auscultation bilaterally  HEART: regular rate and rhythm, no murmurs, rubs, gallops  EXTREMITIES:  peripheral pulses, no LE edema, warmth and swelling in left hand on dorsal surface, thumb side; lesser warmth and swelling in right hand; no pain on palpation bilaterally  NERVOUS SYSTEM:  alert and oriented to place and name                                       LABS:  08-06    129<L>  |  89<L>  |  12  ----------------------------<  96  3.2<L>   |  28  |  0.63 (from 0.48)    Ca    8.3<L>      06 Aug 2019 05:16               10.3   7.8   )-----------( 387      ( 06 Aug 2019 05:16 )             31.0              RECENT CULTURES:  08-04 @ 22:40  .Body Fluid  --  --  --    No growth    08-04 @ 17:44  .Blood  --  --  --    No growth to date.

## 2019-08-06 NOTE — DISCHARGE NOTE PROVIDER - NSDCCPCAREPLAN_GEN_ALL_CORE_FT
PRINCIPAL DISCHARGE DIAGNOSIS  Diagnosis: Pseudogout  Assessment and Plan of Treatment: You were admitted due to pain, swelling, and redness in both of your wrists. We determined that this was likely due to a condition called pseudogout, where you have inflammation in your joints due to the deposition of crystals. You were placed on antibiotics during this visit while we ruled out an infection in your wrist. You were seen by rheumatology and infectious diseases, and you were placed on colchicine, an anti-inflammatory medication. You improved on colchicine. PRINCIPAL DISCHARGE DIAGNOSIS  Diagnosis: Pseudogout  Assessment and Plan of Treatment: You were admitted due to pain, swelling, and redness in both of your wrists. This was likely due to a condition called pseudogout, where you have inflammation in your joints due to the deposition of crystals. You were placed on antibiotics during this visit while we ruled out an infection in your wrist. You were seen by rheumatology and infectious diseases, and you were placed on colchicine, an anti-inflammatory medication. You improved on colchicine, and we recommend that you continue to take colchicine after leaving the hospital. PRINCIPAL DISCHARGE DIAGNOSIS  Diagnosis: Pseudogout  Assessment and Plan of Treatment: You were admitted due to pain, swelling, and redness in both of your wrists. This was likely due to a condition called pseudogout, where you have inflammation in your joints due to the deposition of crystals. You were placed on antibiotics during this visit while we ruled out an infection in your wrist. You were seen by rheumatology and infectious diseases, and you were placed on colchicine, an anti-inflammatory medication. You improved on colchicine, and we recommend that you continue to take colchicine for one month after leaving the hospital. PRINCIPAL DISCHARGE DIAGNOSIS  Diagnosis: Pseudogout  Assessment and Plan of Treatment: You were admitted due to pain, swelling, and redness in both of your wrists. This was likely due to a condition called pseudogout, where you have inflammation in your joints due to the deposition of crystals. You were placed on antibiotics during this visit while we ruled out an infection in your wrist. You were seen by rheumatology and infectious diseases, and you were placed on colchicine, an anti-inflammatory medication. You improved on colchicine, and we recommend that you continue to take colchicine for 30 days after leaving the hospital.      SECONDARY DISCHARGE DIAGNOSES  Diagnosis: UTI (urinary tract infection), bacterial  Assessment and Plan of Treatment: While in the hospital, you were found to have an organism in your urine that was highly resistant to antibiotics. You were placed on the antibiotic meropenem, which is often used to treat such infections, for three days.

## 2019-08-06 NOTE — PROVIDER CONTACT NOTE (OTHER) - ACTION/TREATMENT ORDERED:
MD Dr Meier made aware. Ok'ed to administer trazodone 50 mg early for agitation, will continue to monitor pt

## 2019-08-06 NOTE — PROGRESS NOTE ADULT - ASSESSMENT
89 y/o female PMHx HLD, mild dementia, R cataract c/b corneal injury (blindness), ?prior subclinical L basal ganglia CVA p/w bilateral wrist pain, erythema and swelling x2 days in setting of fever concerning for septic joint vs pseudogout flare, now s/p aspiration which showed many nucleated cells and some calcium pyrophosphate crystals. 89 y/o female PMHx HLD, mild dementia, R cataract c/b corneal injury (blindness), ?prior subclinical L basal ganglia CVA p/w bilateral wrist pain, erythema and swelling in setting of fever concerning for septic joint vs pseudogout flare, now s/p aspiration which showed many nucleated cells and some calcium pyrophosphate crystals. 89 y/o female PMHx HLD, mild dementia, R cataract c/b corneal injury (blindness), ?prior subclinical L basal ganglia CVA p/w bilateral wrist pain, erythema and swelling in setting of fever concerning for likely pseudogout flare, now s/p aspiration which showed many nucleated cells and some calcium pyrophosphate crystals. Hospitalization complicated by finding of ESBL Klebsiella pneumoniae. 89 y/o female PMHx HLD, mild dementia, R cataract c/b corneal injury (blindness), ?prior subclinical L basal ganglia CVA p/w bilateral wrist pain, erythema and swelling in setting of fever concerning for likely pseudogout flare, now s/p aspiration which showed many nucleated cells and some calcium pyrophosphate crystals. Hospitalization complicated by finding of ESBL Klebsiella pneumoniae on Urine cx.

## 2019-08-06 NOTE — PROGRESS NOTE ADULT - SUBJECTIVE AND OBJECTIVE BOX
infectious diseases progress note:    Patient is a 89y old  Female who presents with a chief complaint of Wrist swelling (06 Aug 2019 07:03)        Fever        ROS:  CONSTITUTIONAL:  Negative fever or chills, feels well, good appetite  EYES:  Negative  blurry vision or double vision  CARDIOVASCULAR:  Negative for chest pain or palpitations  RESPIRATORY:  Negative for cough, wheezing, or SOB   GASTROINTESTINAL:  Negative for nausea, vomiting, diarrhea, constipation, or abdominal pain  GENITOURINARY:  Negative frequency, urgency or dysuria  NEUROLOGIC:  No headache, confusion, dizziness, lightheadedness    Allergies    No Known Allergies    Intolerances    penicillin (Stomach Upset)      ANTIBIOTICS/RELEVANT:  antimicrobials  piperacillin/tazobactam IVPB.. 3.375 Gram(s) IV Intermittent every 8 hours  vancomycin  IVPB 1000 milliGRAM(s) IV Intermittent every 12 hours    immunologic:    OTHER:  acetaminophen   Tablet .. 975 milliGRAM(s) Oral every 6 hours PRN  colchicine 0.6 milliGRAM(s) Oral daily  heparin  Injectable 5000 Unit(s) SubCutaneous every 8 hours  melatonin 5 milliGRAM(s) Oral at bedtime  potassium chloride    Tablet ER 20 milliEquivalent(s) Oral every 2 hours  prednisoLONE acetate 1% Suspension 1 Drop(s) Right EYE two times a day  traZODone 50 milliGRAM(s) Oral daily PRN      Objective:  Vital Signs Last 24 Hrs  T(C): 36.7 (06 Aug 2019 05:04), Max: 37.1 (05 Aug 2019 13:24)  T(F): 98 (06 Aug 2019 05:04), Max: 98.8 (05 Aug 2019 13:24)  HR: 61 (06 Aug 2019 05:04) (61 - 87)  BP: 106/64 (06 Aug 2019 05:04) (106/64 - 152/81)  BP(mean): --  RR: 18 (06 Aug 2019 05:04) (18 - 18)  SpO2: 97% (06 Aug 2019 05:04) (95% - 97%)    PHYSICAL EXAM:     Eyes:SHARON, EOMI  Ear/Nose/Throat: no oral lesion, no sinus tenderness on percussion	  Neck:no JVD, no lymphadenopathy, supple  Respiratory: CTA bharathi     Gastrointestinal:soft, (+) BS, no HSM  Extremities no cellulitis  swelling but not tedner on left wrist  right wrist less tender        LABS:                        10.3   7.8   )-----------( 387      ( 06 Aug 2019 05:16 )             31.0     08-06    129<L>  |  89<L>  |  12  ----------------------------<  96  3.2<L>   |  28  |  0.63    Ca    8.3<L>      06 Aug 2019 05:16  Phos  3.3     08-  Mg     1.5     08-    TPro  7.0  /  Alb  2.9<L>  /  TBili  0.4  /  DBili  x   /  AST  46<H>  /  ALT  64<H>  /  AlkPhos  82  -    PT/INR - ( 04 Aug 2019 13:18 )   PT: 18.3 sec;   INR: 1.57 ratio         PTT - ( 04 Aug 2019 13:18 )  PTT:25.7 sec  Urinalysis Basic - ( 04 Aug 2019 14:18 )    Color: Yellow / Appearance: Slightly Turbid / S.010 / pH: x  Gluc: x / Ketone: Negative  / Bili: Negative / Urobili: Negative   Blood: x / Protein: Trace / Nitrite: Negative   Leuk Esterase: Large / RBC: 7 /hpf /  /HPF   Sq Epi: x / Non Sq Epi: 0 /hpf / Bacteria: Many          MICROBIOLOGY:    RECENT CULTURES:   @ 22:40 .Body Fluid       polymorphonuclear leukocytes seen  No organisms seen  by cytocentrifuge           No growth     @ 17:44 .Blood                No growth to date.     @ 16:36 .Urine                >100,000 CFU/ml Gram Negative Rods          RESPIRATORY CULTURES:              RADIOLOGY & ADDITIONAL STUDIES:        Pager [phone]  After 5 pm/weekends or if no response :[phone]

## 2019-08-06 NOTE — DISCHARGE NOTE PROVIDER - CARE PROVIDERS DIRECT ADDRESSES
,[email or web address]

## 2019-08-06 NOTE — DISCHARGE NOTE PROVIDER - CARE PROVIDER_API CALL
Raj Gonzales)  Medicine  [facility]  [address] [place] [address], [address]  [place] [address]  Phone: [phone]  Fax: [phone]  Follow Up Time: Raj Gonzales)  Medicine  [facility]  [address] [facility], [address]  [place] [address]  Phone: [phone]  Fax: [phone]  Follow Up Time:     Radha Nassar)  Internal Medicine; Rheumatology  [address] [facility] [facility] [address]  [place] [address]  Phone: [phone]  Fax: [phone]  Follow Up Time:

## 2019-08-06 NOTE — PROGRESS NOTE ADULT - PROBLEM SELECTOR PLAN 3
- no hx of prior MDR urinary infection; asymptomatic; no fever or leukocytosis  - f/u UCx, lactate was 1.0  - no need to treat specifically Unclear symptoms. No fever or leukocytosis. ESBL Klebsiella pneumoniae.  - treat w/ meropenem 1 g q12h Na 126 on presentation, up to 133, then down to 129; possibly 2/2 SIADH in setting of infection or pain. Concurrent hypokalemia to 3.2.  - NS with additive KCl 20 mEq Na 126 on presentation, up to 133, then down to 129, unclear etiology as patient downtrended today, unable to obtain urine lytes in setting of her mental status and agitation, possibly hypovolemic, will empirically give fluids to monitor for improvement  - NS with additive KCl 20 mEq  - trend BMP daily

## 2019-08-06 NOTE — PROGRESS NOTE ADULT - PROBLEM SELECTOR PLAN 1
Differential is septic vs CPPD in setting of fever (on admission) with b/l wrist pain/swelling/erythema; now afebrile. Patient had recent hospitalization for similar sxs; thought to be CPPD; improved w/ ceftriaxone/vanc and 2wk prednisone taper  - aspiration showed [id number] nucleated cells with rare CPP crystals, pink, Gram stain showed no organisms, awaiting cultures, CRP 94   - per rheum, restarted vancomycin/zosyn, trough after every 4th dose  - f/u blood cx and joint aspiration cx; both show NGTD  - rheumatology consulted, acetaminophen for pain for now  - gave colchicine 1.2 mg yesterday, colchicine 0.6 mg today  - cold compress b/l wrists Differential is septic vs CPPD in setting of fever (on admission) with b/l wrist pain/swelling/erythema; now afebrile. Patient had recent hospitalization for similar sxs; thought to be CPPD; improved w/ ceftriaxone/vanc and 2wk prednisone taper  - aspiration showed [id number] nucleated cells with rare CPP crystals, pink, Gram stain showed no organisms, awaiting cultures, CRP 94   - patient has low suspicion for septic arthritis at this point and has marked 24 hour bump in creatinine, d/c antibiotics  - f/u blood cx and joint aspiration cx; both show NGTD  - rheumatology consulted, acetaminophen for pain for now  - gave colchicine 1.2 mg yesterday, colchicine 0.6 mg today  - cold compress b/l wrists Likely CPPD with b/l wrist pain/swelling/erythema; now afebrile. Patient had recent hospitalization for similar sxs; thought to be CPPD; improved w/ ceftriaxone/vanc and 2wk prednisone taper  - aspiration showed [id number] nucleated cells with rare CPP crystals, pink, Gram stain showed no organisms, awaiting cultures, CRP 94   - patient has low suspicion for septic arthritis at this point and has marked 24 hour bump in creatinine, d/c antibiotics  - f/u blood cx and joint aspiration cx; both show NGTD  - rheumatology consulted, acetaminophen for pain for now  - gave colchicine 1.2 mg yesterday, colchicine 0.6 mg today  - cold compress b/l wrists Likely CPPD with b/l wrist pain/swelling/erythema; now afebrile. Recent hosp w/ similar sxs; thought to be CPPD; improved w/ ceftriaxone/vanc/2wk pred taper  - aspiration showed [id number] nucleated cells with rare CPP crystals, pink, Gram stain showed no organisms, awaiting cultures, CRP 94   - patient has low suspicion for septic arthritis at this point and has marked 24 hour bump in creatinine, d/c antibiotics; ID and rheum both agree with this decision  - f/u blood cx and joint aspiration cx; both show NGTD  - rheumatology consulted, acetaminophen for pain for now  - gave colchicine 1.2 mg yesterday, colchicine 0.6 mg today  - cold compress b/l wrists Likely CPPD with b/l wrist pain/swelling/erythema; now afebrile. Recent hosp w/ similar sxs; thought to be CPPD; improved w/ ceftriaxone/vanc/2wk pred taper. Aspiration showed [id number] nucleated cells with rare CPP crystals, pink, Gram stain showed no organisms, awaiting cultures, CRP 94.  - patient has low suspicion for septic arthritis and has marked 24 hour bump in creatinine, d/c antibiotics for septic arthritis; ID and rheum agree with this decision  - f/u blood cx and joint aspiration cx; both show NGTD  - rheumatology consulted, acetaminophen for pain for now  - ID following, appreciate recommendations  - gave colchicine 1.2 mg yesterday, colchicine 0.6 mg today  - cold compress b/l wrists

## 2019-08-06 NOTE — PROGRESS NOTE ADULT - PROBLEM SELECTOR PLAN 2
- alert and oriented x  - for agitation, reorientation and reassurance, then trazodone 50 mg qd prn Difficult to elicit symptoms; patient does not complain of dysuria. No fever or leukocytosis. ESBL Klebsiella pneumoniae on urine culture.  - treat w/ meropenem 1 g q12h  - appreciate ID recommendations

## 2019-08-07 DIAGNOSIS — H04.123 DRY EYE SYNDROME OF BILATERAL LACRIMAL GLANDS: ICD-10-CM

## 2019-08-07 DIAGNOSIS — Z71.89 OTHER SPECIFIED COUNSELING: ICD-10-CM

## 2019-08-07 LAB
ANION GAP SERPL CALC-SCNC: 14 MMOL/L — SIGNIFICANT CHANGE UP (ref 5–17)
BASOPHILS # BLD AUTO: 0 K/UL — SIGNIFICANT CHANGE UP (ref 0–0.2)
BASOPHILS NFR BLD AUTO: 0.4 % — SIGNIFICANT CHANGE UP (ref 0–2)
BUN SERPL-MCNC: 12 MG/DL — SIGNIFICANT CHANGE UP (ref 7–23)
CALCIUM SERPL-MCNC: 8.8 MG/DL — SIGNIFICANT CHANGE UP (ref 8.4–10.5)
CHLORIDE SERPL-SCNC: 94 MMOL/L — LOW (ref 96–108)
CO2 SERPL-SCNC: 27 MMOL/L — SIGNIFICANT CHANGE UP (ref 22–31)
CREAT SERPL-MCNC: 0.51 MG/DL — SIGNIFICANT CHANGE UP (ref 0.5–1.3)
EOSINOPHIL # BLD AUTO: 0.4 K/UL — SIGNIFICANT CHANGE UP (ref 0–0.5)
EOSINOPHIL NFR BLD AUTO: 5.6 % — SIGNIFICANT CHANGE UP (ref 0–6)
GLUCOSE SERPL-MCNC: 81 MG/DL — SIGNIFICANT CHANGE UP (ref 70–99)
HCT VFR BLD CALC: 34.1 % — LOW (ref 34.5–45)
HGB BLD-MCNC: 10.9 G/DL — LOW (ref 11.5–15.5)
LYMPHOCYTES # BLD AUTO: 1.7 K/UL — SIGNIFICANT CHANGE UP (ref 1–3.3)
LYMPHOCYTES # BLD AUTO: 26.1 % — SIGNIFICANT CHANGE UP (ref 13–44)
MCHC RBC-ENTMCNC: 29.4 PG — SIGNIFICANT CHANGE UP (ref 27–34)
MCHC RBC-ENTMCNC: 32.1 GM/DL — SIGNIFICANT CHANGE UP (ref 32–36)
MCV RBC AUTO: 91.8 FL — SIGNIFICANT CHANGE UP (ref 80–100)
MONOCYTES # BLD AUTO: 0.5 K/UL — SIGNIFICANT CHANGE UP (ref 0–0.9)
MONOCYTES NFR BLD AUTO: 7.7 % — SIGNIFICANT CHANGE UP (ref 2–14)
NEUTROPHILS # BLD AUTO: 3.8 K/UL — SIGNIFICANT CHANGE UP (ref 1.8–7.4)
NEUTROPHILS NFR BLD AUTO: 60.1 % — SIGNIFICANT CHANGE UP (ref 43–77)
PLATELET # BLD AUTO: 422 K/UL — HIGH (ref 150–400)
POTASSIUM SERPL-MCNC: 3.8 MMOL/L — SIGNIFICANT CHANGE UP (ref 3.5–5.3)
POTASSIUM SERPL-SCNC: 3.8 MMOL/L — SIGNIFICANT CHANGE UP (ref 3.5–5.3)
RBC # BLD: 3.71 M/UL — LOW (ref 3.8–5.2)
RBC # FLD: 12.9 % — SIGNIFICANT CHANGE UP (ref 10.3–14.5)
SODIUM SERPL-SCNC: 135 MMOL/L — SIGNIFICANT CHANGE UP (ref 135–145)
WBC # BLD: 6.4 K/UL — SIGNIFICANT CHANGE UP (ref 3.8–10.5)
WBC # FLD AUTO: 6.4 K/UL — SIGNIFICANT CHANGE UP (ref 3.8–10.5)

## 2019-08-07 PROCEDURE — 99232 SBSQ HOSP IP/OBS MODERATE 35: CPT | Mod: GC

## 2019-08-07 RX ADMIN — Medication 5 MILLIGRAM(S): at 21:05

## 2019-08-07 RX ADMIN — Medication 1 DROP(S): at 05:30

## 2019-08-07 RX ADMIN — Medication 1 DROP(S): at 17:48

## 2019-08-07 RX ADMIN — Medication 1 DROP(S): at 21:05

## 2019-08-07 RX ADMIN — Medication 0.6 MILLIGRAM(S): at 12:25

## 2019-08-07 RX ADMIN — HEPARIN SODIUM 5000 UNIT(S): 5000 INJECTION INTRAVENOUS; SUBCUTANEOUS at 05:33

## 2019-08-07 RX ADMIN — MEROPENEM 100 MILLIGRAM(S): 1 INJECTION INTRAVENOUS at 12:25

## 2019-08-07 RX ADMIN — Medication 1 DROP(S): at 14:24

## 2019-08-07 RX ADMIN — HEPARIN SODIUM 5000 UNIT(S): 5000 INJECTION INTRAVENOUS; SUBCUTANEOUS at 14:25

## 2019-08-07 RX ADMIN — Medication 50 MILLIGRAM(S): at 21:05

## 2019-08-07 RX ADMIN — HEPARIN SODIUM 5000 UNIT(S): 5000 INJECTION INTRAVENOUS; SUBCUTANEOUS at 21:05

## 2019-08-07 RX ADMIN — MEROPENEM 100 MILLIGRAM(S): 1 INJECTION INTRAVENOUS at 21:05

## 2019-08-07 RX ADMIN — MEROPENEM 100 MILLIGRAM(S): 1 INJECTION INTRAVENOUS at 00:52

## 2019-08-07 RX ADMIN — SIMVASTATIN 10 MILLIGRAM(S): 20 TABLET, FILM COATED ORAL at 21:05

## 2019-08-07 NOTE — PROGRESS NOTE ADULT - SUBJECTIVE AND OBJECTIVE BOX
PEDRO SHAWINE  [id number]    INTERVAL HPI/OVERNIGHT EVENTS:    today with less erythema and edema over the left wrist. minimal discomfort while moving her hands.      MEDICATIONS  (STANDING):  artificial tears (preservative free) Ophthalmic Solution 1 Drop(s) Both EYES three times a day  colchicine 0.6 milliGRAM(s) Oral daily  heparin  Injectable 5000 Unit(s) SubCutaneous every 8 hours  melatonin 5 milliGRAM(s) Oral at bedtime  meropenem  IVPB 1000 milliGRAM(s) IV Intermittent every 12 hours  prednisoLONE acetate 1% Suspension 1 Drop(s) Right EYE two times a day  simvastatin 10 milliGRAM(s) Oral at bedtime  sodium chloride 0.9% 1000 milliLiter(s) (50 mL/Hr) IV Continuous <Continuous>    MEDICATIONS  (PRN):  acetaminophen   Tablet .. 975 milliGRAM(s) Oral every 6 hours PRN Mild Pain (1 - 3), Moderate Pain (4 - 6)  traZODone 50 milliGRAM(s) Oral daily PRN agitation      Vital Signs Last 24 Hrs  T(C): 36.4 (07 Aug 2019 13:45), Max: 36.6 (06 Aug 2019 21:56)  T(F): 97.6 (07 Aug 2019 13:45), Max: 97.8 (06 Aug 2019 21:56)  HR: 72 (07 Aug 2019 13:45) (64 - 84)  BP: 144/78 (07 Aug 2019 13:45) (123/73 - 149/79)  BP(mean): --  RR: 18 (07 Aug 2019 13:45) (18 - 18)  SpO2: 94% (07 Aug 2019 13:45) (94% - 97%)    Physical Exam:  General: NAD  Cardio: +S1/S2, RRR  Resp: CTA b/l  GI: +BS, soft, NT/ND  MSK: less swelling and erythema over the left wrist, ROM passive or active not painful. no TTP to palpation.   Neuro: Alert, AOx1, cooperative.    LABS:                        10.9   6.4   )-----------( 422      ( 07 Aug 2019 07:17 )             34.1     08-07    135  |  94<L>  |  12  ----------------------------<  81  3.8   |  27  |  0.51    Ca    8.8      07 Aug 2019 07:16              RADIOLOGY & ADDITIONAL TESTS: PEDRO BYRD  [id number]    INTERVAL HPI/OVERNIGHT EVENTS:    has no complaints - says she has no pain.   today with less erythema and edema over the left wrist. minimal discomfort while moving her hands.      MEDICATIONS  (STANDING):  artificial tears (preservative free) Ophthalmic Solution 1 Drop(s) Both EYES three times a day  colchicine 0.6 milliGRAM(s) Oral daily  heparin  Injectable 5000 Unit(s) SubCutaneous every 8 hours  melatonin 5 milliGRAM(s) Oral at bedtime  meropenem  IVPB 1000 milliGRAM(s) IV Intermittent every 12 hours  prednisoLONE acetate 1% Suspension 1 Drop(s) Right EYE two times a day  simvastatin 10 milliGRAM(s) Oral at bedtime  sodium chloride 0.9% 1000 milliLiter(s) (50 mL/Hr) IV Continuous <Continuous>    MEDICATIONS  (PRN):  acetaminophen   Tablet .. 975 milliGRAM(s) Oral every 6 hours PRN Mild Pain (1 - 3), Moderate Pain (4 - 6)  traZODone 50 milliGRAM(s) Oral daily PRN agitation      Vital Signs Last 24 Hrs  T(C): 36.4 (07 Aug 2019 13:45), Max: 36.6 (06 Aug 2019 21:56)  T(F): 97.6 (07 Aug 2019 13:45), Max: 97.8 (06 Aug 2019 21:56)  HR: 72 (07 Aug 2019 13:45) (64 - 84)  BP: 144/78 (07 Aug 2019 13:45) (123/73 - 149/79)  BP(mean): --  RR: 18 (07 Aug 2019 13:45) (18 - 18)  SpO2: 94% (07 Aug 2019 13:45) (94% - 97%)    Physical Exam:  General: NAD  Cardio: +S1/S2, RRR  Resp: CTA b/l  GI: +BS, soft, NT/ND  MSK: erythema and effusion over dorsal wrist resolved - ROM passive or active not painful. no TTP to palpation.   Neuro: Alert, AOx1, cooperative.    LABS:                        10.9   6.4   )-----------( 422      ( 07 Aug 2019 07:17 )             34.1     08-07    135  |  94<L>  |  12  ----------------------------<  81  3.8   |  27  |  0.51    Ca    8.8      07 Aug 2019 07:16              RADIOLOGY & ADDITIONAL TESTS:

## 2019-08-07 NOTE — PROGRESS NOTE ADULT - PROBLEM SELECTOR PLAN 4
Alert and oriented to place and name.  - for agitation, reorientation and reassurance, then trazodone 50 mg qd prn

## 2019-08-07 NOTE — PROGRESS NOTE ADULT - PROBLEM SELECTOR PLAN 1
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 Recent hosp w/ similar sxs; thought to be CPPD; improved w/ ceftriaxone/vanc/2wk pred taper. Aspiration showed [id number] nucleated cells with rare CPP crystals, pink, Gram stain showed no organisms, awaiting cultures, CRP 94.  - colchicine 0.6 mg qd, acetaminophen for pain  - f/u blood cx and joint aspiration cx; both show NGTD  - rheumatology and ID following, appreciate recommendations  - cold compress b/l wrists Likely CPPD with b/l wrist pain/swelling/erythema; now afebrile. Recent hosp w/ similar sxs; thought to be CPPD; improved w/ ceftriaxone/vanc/2wk pred taper. Aspiration showed [id number] nucleated cells with rare CPP crystals, pink, Gram stain showed no organisms, awaiting cultures, CRP 94.  - colchicine 0.6 mg qd, acetaminophen for pain  - f/u blood cx and joint aspiration cx; both show NGTD  - per rheumatology, will continue colchicine 0.6 mg qd for several weeks  - recommend rheumatology recommendations  - cold compress b/l wrists

## 2019-08-07 NOTE — PROGRESS NOTE ADULT - SUBJECTIVE AND OBJECTIVE BOX
THIS NOTE IS INCOMPLETE    Dru Manrique MD  Beeper: [phone]    Subjective:    Patient is a 89 year old female who presents with a chief complaint of wrist swelling.    Overnight events: No acute events overnight.    MEDICATIONS  (STANDING):  colchicine 0.6 milliGRAM(s) Oral daily  heparin  Injectable 5000 Unit(s) SubCutaneous every 8 hours  melatonin 5 milliGRAM(s) Oral at bedtime  meropenem  IVPB 1000 milliGRAM(s) IV Intermittent every 12 hours  prednisoLONE acetate 1% Suspension 1 Drop(s) Right EYE two times a day  simvastatin 10 milliGRAM(s) Oral at bedtime  sodium chloride 0.9% 1000 milliLiter(s) (50 mL/Hr) IV Continuous <Continuous>    MEDICATIONS  (PRN):  acetaminophen   Tablet .. 975 milliGRAM(s) Oral every 6 hours PRN Mild Pain (1 - 3), Moderate Pain (4 - 6)  traZODone 50 milliGRAM(s) Oral daily PRN agitation    Objective:    Vitals:   T(F): 97.4 (08-07-19 @ 05:13), Max: 97.8 (08-06-19 @ 14:21)  HR: 64 (08-07-19 @ 05:13) (62 - 84)  BP: 137/75 (08-07-19 @ 05:13) (123/73 - 152/59)  RR: 18 (08-07-19 @ 05:13) (18 - 18)  SpO2: 96% (08-07-19 @ 05:13) (91% - 97%)                I&O's Summary    06 Aug 2019 07:01  -  07 Aug 2019 07:00  --------------------------------------------------------  IN: 890 mL / OUT: 0 mL / NET: 890 mL    PHYSICAL EXAM:  GENERAL: NAD, well-groomed, well-developed  HEAD:  Atraumatic, Normocephalic  EYES: EOMI, PERRLA, conjunctiva and sclera clear  ENMT: No tonsillar erythema, exudates, or enlargement; Moist mucous membranes, Good dentition, No lesions  NECK: Supple, No JVD, Normal thyroid  CHEST/LUNG: Clear to percussion bilaterally; No rales, rhonchi, wheezing, or rubs  HEART: Regular rate and rhythm; No murmurs, rubs, or gallops  ABDOMEN: Soft, Nontender, Nondistended; Bowel sounds present  EXTREMITIES:  2+ Peripheral Pulses, No clubbing, cyanosis, or edema  LYMPH: No lymphadenopathy noted  SKIN: No rashes or lesions  NERVOUS SYSTEM:  Alert & Oriented X3, Good concentration; Motor Strength 5/5 B/L upper and lower extremities; DTRs 2+ intact and symmetric                                             LABS:  08-06    129<L>  |  89<L>  |  12  ----------------------------<  96  3.2<L>   |  28  |  0.63    Ca    8.3<L>      06 Aug 2019 05:16               10.9   6.4   )-----------( 422      ( 07 Aug 2019 07:17 )             34.1     RECENT CULTURES:  08-04 @ 22:40  .Body Fluid  --  --  --    No growth  --  08-04 @ 17:44  .Blood  --  --  --    No growth to date.  --  08-04 @ 16:36  .Urine  Klebsiella pneumoniae ESBL  Klebsiella pneumoniae ESBL  ZIA    >100,000 CFU/ml Klebsiella pneumoniae ESBL  -- THIS NOTE IS INCOMPLETE    Dru Manrique MD  Beeper: [phone]    Subjective:    Patient is a 89 year old female who presents with a chief complaint of wrist swelling.    Overnight events: No acute events overnight.    MEDICATIONS  (STANDING):  colchicine 0.6 milliGRAM(s) Oral daily  heparin  Injectable 5000 Unit(s) SubCutaneous every 8 hours  melatonin 5 milliGRAM(s) Oral at bedtime  meropenem  IVPB 1000 milliGRAM(s) IV Intermittent every 12 hours  prednisoLONE acetate 1% Suspension 1 Drop(s) Right EYE two times a day  simvastatin 10 milliGRAM(s) Oral at bedtime  sodium chloride 0.9% 1000 milliLiter(s) (50 mL/Hr) IV Continuous <Continuous>    MEDICATIONS  (PRN):  acetaminophen   Tablet .. 975 milliGRAM(s) Oral every 6 hours PRN Mild Pain (1 - 3), Moderate Pain (4 - 6)  traZODone 50 milliGRAM(s) Oral daily PRN agitation    Objective:    Vitals:   T(F): 97.4 (08-07-19 @ 05:13), Max: 97.8 (08-06-19 @ 14:21)  HR: 64 (08-07-19 @ 05:13) (62 - 84)  BP: 137/75 (08-07-19 @ 05:13) (123/73 - 152/59)  RR: 18 (08-07-19 @ 05:13) (18 - 18)  SpO2: 96% (08-07-19 @ 05:13) (91% - 97%)                I&O's Summary    06 Aug 2019 07:01  -  07 Aug 2019 07:00  --------------------------------------------------------  IN: 890 mL / OUT: 0 mL / NET: 890 mL    PHYSICAL EXAM:  GENERAL: NAD, well-groomed, well-developed  HEAD:  Atraumatic, Normocephalic  EYES: EOMI, PERRLA, conjunctiva and sclera clear  ENMT: No tonsillar erythema, exudates, or enlargement; Moist mucous membranes, Good dentition, No lesions  NECK: Supple, No JVD, Normal thyroid  CHEST/LUNG: Clear to percussion bilaterally; No rales, rhonchi, wheezing, or rubs  HEART: Regular rate and rhythm; No murmurs, rubs, or gallops  ABDOMEN: Soft, Nontender, Nondistended; Bowel sounds present  EXTREMITIES:  2+ Peripheral Pulses, No clubbing, cyanosis, or edema  LYMPH: No lymphadenopathy noted  SKIN: No rashes or lesions  NERVOUS SYSTEM:  Alert & Oriented X3, Good concentration; Motor Strength 5/5 B/L upper and lower extremities; DTRs 2+ intact and symmetric                                             LABS:    08-07    135  |  94<L>  |  12  ----------------------------<  81  3.8   |  27  |  0.51    Ca    8.8      07 Aug 2019 07:16               10.9   6.4   )-----------( 422      ( 07 Aug 2019 07:17 )             34.1     RECENT CULTURES:  08-04 @ 22:40  .Body Fluid  --  --  --    No growth  --  08-04 @ 17:44  .Blood  --  --  --    No growth to date.  --  08-04 @ 16:36  .Urine  Klebsiella pneumoniae ESBL  Klebsiella pneumoniae ESBL  ZIA    >100,000 CFU/ml Klebsiella pneumoniae ESBL  -- THIS NOTE IS INCOMPLETE    Dru Manrique MD  Beeper: [phone]    Subjective:    Patient is a 89 year old female who presents with a chief complaint of wrist swelling.    Overnight events: No acute events overnight.    Patient states that she feels good and that she slept well. She has no pain anywhere. Her hands feel fine. She has been urinating. She states that her biggest concern is her eye is dry and she wants her eye drops, prednisolone 1%.    MEDICATIONS  (STANDING):  colchicine 0.6 milliGRAM(s) Oral daily  heparin  Injectable 5000 Unit(s) SubCutaneous every 8 hours  melatonin 5 milliGRAM(s) Oral at bedtime  meropenem  IVPB 1000 milliGRAM(s) IV Intermittent every 12 hours  prednisoLONE acetate 1% Suspension 1 Drop(s) Right EYE two times a day  simvastatin 10 milliGRAM(s) Oral at bedtime  sodium chloride 0.9% 1000 milliLiter(s) (50 mL/Hr) IV Continuous <Continuous>    MEDICATIONS  (PRN):  acetaminophen   Tablet .. 975 milliGRAM(s) Oral every 6 hours PRN Mild Pain (1 - 3), Moderate Pain (4 - 6)  traZODone 50 milliGRAM(s) Oral daily PRN agitation    Objective:    Vitals:   T(F): 97.4 (08-07-19 @ 05:13), Max: 97.8 (08-06-19 @ 14:21)  HR: 64 (08-07-19 @ 05:13) (62 - 84)  BP: 137/75 (08-07-19 @ 05:13) (123/73 - 152/59)  RR: 18 (08-07-19 @ 05:13) (18 - 18)  SpO2: 96% (08-07-19 @ 05:13) (91% - 97%)                I&O's Summary    06 Aug 2019 07:01  -  07 Aug 2019 07:00  --------------------------------------------------------  IN: 890 mL / OUT: 0 mL / NET: 890 mL    PHYSICAL EXAM:  GENERAL: NAD, well-groomed, well-developed  HEAD:  atraumatic and normocephalic  ENMT: moist mucous membranes  CHEST/LUNG: clear to auscultation bilaterally; no rales, rhonchi, wheezing, or rubs  HEART: regular rate and rhythm; no murmurs, rubs, or gallops  EXTREMITIES:  swelling in left wrist on dorsal surface, thumb side; decreased from yesterday; swelling in right wrist is improved as well; continued warmth  NERVOUS SYSTEM:  alert and oriented to place and name    LABS:    08-07    135  |  94<L>  |  12  ----------------------------<  81  3.8   |  27  |  0.51    Ca    8.8      07 Aug 2019 07:16               10.9   6.4   )-----------( 422      ( 07 Aug 2019 07:17 )             34.1     RECENT CULTURES:  08-04 @ 22:40  .Body Fluid  --  --  --    No growth  --  08-04 @ 17:44  .Blood  --  --  --    No growth to date.  --  08-04 @ 16:36  .Urine  Klebsiella pneumoniae ESBL  Klebsiella pneumoniae ESBL  ZIA    >100,000 CFU/ml Klebsiella pneumoniae ESBL  -- Dru Manrique MD  Beeper: [phone]    Subjective:    Patient is a 89 year old female who presents with a chief complaint of wrist swelling.    Overnight events: No acute events overnight.    Patient states that she feels good and that she slept well. She has no pain anywhere. Her hands feel fine. She has been urinating. She states that her biggest concern is her eye is dry and she wants her eye drops, prednisolone 1%.    MEDICATIONS  (STANDING):  colchicine 0.6 milliGRAM(s) Oral daily  heparin  Injectable 5000 Unit(s) SubCutaneous every 8 hours  melatonin 5 milliGRAM(s) Oral at bedtime  meropenem  IVPB 1000 milliGRAM(s) IV Intermittent every 12 hours  prednisoLONE acetate 1% Suspension 1 Drop(s) Right EYE two times a day  simvastatin 10 milliGRAM(s) Oral at bedtime  sodium chloride 0.9% 1000 milliLiter(s) (50 mL/Hr) IV Continuous <Continuous>    MEDICATIONS  (PRN):  acetaminophen   Tablet .. 975 milliGRAM(s) Oral every 6 hours PRN Mild Pain (1 - 3), Moderate Pain (4 - 6)  traZODone 50 milliGRAM(s) Oral daily PRN agitation    Objective:    Vitals:   T(F): 97.4 (08-07-19 @ 05:13), Max: 97.8 (08-06-19 @ 14:21)  HR: 64 (08-07-19 @ 05:13) (62 - 84)  BP: 137/75 (08-07-19 @ 05:13) (123/73 - 152/59)  RR: 18 (08-07-19 @ 05:13) (18 - 18)  SpO2: 96% (08-07-19 @ 05:13) (91% - 97%)                I&O's Summary    06 Aug 2019 07:01  -  07 Aug 2019 07:00  --------------------------------------------------------  IN: 890 mL / OUT: 0 mL / NET: 890 mL    PHYSICAL EXAM:  GENERAL: NAD, well-groomed, well-developed  HEAD:  atraumatic and normocephalic  ENMT: moist mucous membranes  CHEST/LUNG: clear to auscultation bilaterally; no rales, rhonchi, wheezing, or rubs  HEART: regular rate and rhythm; no murmurs, rubs, or gallops  EXTREMITIES:  swelling in left wrist on dorsal surface, thumb side; decreased from yesterday; swelling in right wrist is improved as well; continued warmth  NERVOUS SYSTEM:  alert and oriented to place and name    LABS:    08-07    135  |  94<L>  |  12  ----------------------------<  81  3.8   |  27  |  0.51    Ca    8.8      07 Aug 2019 07:16               10.9   6.4   )-----------( 422      ( 07 Aug 2019 07:17 )             34.1     RECENT CULTURES:  08-04 @ 22:40  .Body Fluid  --  --  --    No growth  --  08-04 @ 17:44  .Blood  --  --  --    No growth to date.  --  08-04 @ 16:36  .Urine  Klebsiella pneumoniae ESBL  Klebsiella pneumoniae ESBL  ZIA    >100,000 CFU/ml Klebsiella pneumoniae ESBL  --

## 2019-08-07 NOTE — CHART NOTE - NSCHARTNOTEFT_GEN_A_CORE
Attempted to reach daughter Cathryn, no response, tried multiple numbers, will re-attempt again tomorrow    Flaca Lang  Hospitalist Attending  [phone]

## 2019-08-07 NOTE — PROGRESS NOTE ADULT - PROBLEM SELECTOR PLAN 2
Difficult to elicit symptoms; patient does not complain of dysuria. No fever or leukocytosis. ESBL Klebsiella pneumoniae on urine culture.  - treat w/ meropenem 1 g q12h, day 2 of 3  - appreciate ID recommendations Difficult to elicit symptoms; patient does not endorse dysuria. No fever or leukocytosis. ESBL Klebsiella pneumoniae on urine culture.  - treat w/ meropenem 1 g q12h, day 2 of 3

## 2019-08-07 NOTE — PROGRESS NOTE ADULT - ASSESSMENT
89 y/o female PMHx HLD, mild dementia, R cataract c/b corneal injury (blindness), ?prior subclinical L basal ganglia CVA p/w bilateral wrist pain, erythema and swelling in setting of fever concerning for likely pseudogout flare, now s/p aspiration which showed many nucleated cells and some calcium pyrophosphate crystals. Hospitalization complicated by finding of ESBL Klebsiella pneumoniae on Urine cx. 89 y/o female PMHx HLD, mild dementia, R cataract c/b corneal injury (blindness), ?prior subclinical L basal ganglia CVA p/w bilateral wrist pain, erythema and swelling in setting of fever concerning for likely pseudogout flare, now s/p aspiration which showed many nucleated cells and some calcium pyrophosphate crystals. Hospitalization complicated by finding of ESBL Klebsiella pneumoniae on urine cx. 89 y/o female PMHx HLD, mild dementia, R cataract c/b corneal injury (blindness), ?prior subclinical L basal ganglia CVA p/w bilateral wrist pain, erythema and swelling in setting of fever concerning for pseudogout flare, now s/p aspiration which showed many nucleated cells and some calcium pyrophosphate crystals. Hospitalization complicated by finding of ESBL Klebsiella pneumoniae on urine cx.

## 2019-08-07 NOTE — PROGRESS NOTE ADULT - ASSESSMENT
89yo F with PMHx HTN, CVA and a recent admission for management of possible wrist septic arthritis vs CPPD disease. presented to the hospital for evaluation of two days of wrist pain, swelling and erythema mostly on the left hand. PE with significant synovitis of the left wrist along with erythema and tenderness on exam. Presentation consistent with Pseudogout.    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  Arthrocentesis with cppd crystals. Gm stain and cultures negative. presentation and data thus far more consistent with pseudogout flare as opposed to infectious arthritis.  >continue with colchicine 0.6 mg daily  >the patient will complete one month of colchicine 0.6 mg daily for pseudogout. follow up with Dr. Nassar in 4-6 weeks. Rheumatology clinic at [address] phone: [phone]    Bola Aguilera Rheum Fellow I  d/w Attending Dr. Nassar

## 2019-08-07 NOTE — PROGRESS NOTE ADULT - PROBLEM SELECTOR PLAN 5
DVT ppx: Hep sc q8  Diet: regular  GOC: spoke with daughter, patient is full code  Dispo: pending improvement of sxs, will likely need discharge to long term care facility DVT ppx: Hep sc q8  Diet: regular  GOC: spoke with daughter, patient is full code  Dispo: discharge to long term care facility Patient takes prednisolone 1% eye drops (has R cataract c/b corneal injury).  - added artificial tears

## 2019-08-07 NOTE — PROGRESS NOTE ADULT - PROBLEM SELECTOR PLAN 3
Na 126 on presentation, now 135, unclear etiology as patient downtrended today, unable to obtain urine lytes in setting of her mental status and agitation, possibly hypovolemic, empirically gave fluids to monitor for improvement  - continue maintenance fluids  - trend BMP daily Na 126 on presentation, now 135, unclear etiology as patient downtrended today, unable to obtain urine lytes in setting of her mental status and agitation, possibly hypovolemic, empirically gave fluids to monitor for improvement  - no need for further BMPs or IV fluids

## 2019-08-07 NOTE — PROGRESS NOTE ADULT - PROBLEM SELECTOR PLAN 7
DVT ppx: Hep sc q8  Diet: regular  Dispo: discharge to long term care facility DVT ppx: Hep sc q8  Diet: regular  Dispo: discharge to rehab, then long term care facility

## 2019-08-08 ENCOUNTER — TRANSCRIPTION ENCOUNTER (OUTPATIENT)
Age: 84
End: 2019-08-08

## 2019-08-08 VITALS
HEART RATE: 74 BPM | OXYGEN SATURATION: 97 % | TEMPERATURE: 98 F | RESPIRATION RATE: 18 BRPM | SYSTOLIC BLOOD PRESSURE: 120 MMHG | DIASTOLIC BLOOD PRESSURE: 65 MMHG

## 2019-08-08 PROCEDURE — 81001 URINALYSIS AUTO W/SCOPE: CPT

## 2019-08-08 PROCEDURE — 87070 CULTURE OTHR SPECIMN AEROBIC: CPT

## 2019-08-08 PROCEDURE — 84145 PROCALCITONIN (PCT): CPT

## 2019-08-08 PROCEDURE — 96374 THER/PROPH/DIAG INJ IV PUSH: CPT

## 2019-08-08 PROCEDURE — 82435 ASSAY OF BLOOD CHLORIDE: CPT

## 2019-08-08 PROCEDURE — 83605 ASSAY OF LACTIC ACID: CPT

## 2019-08-08 PROCEDURE — 84132 ASSAY OF SERUM POTASSIUM: CPT

## 2019-08-08 PROCEDURE — 80202 ASSAY OF VANCOMYCIN: CPT

## 2019-08-08 PROCEDURE — 87075 CULTR BACTERIA EXCEPT BLOOD: CPT

## 2019-08-08 PROCEDURE — 93005 ELECTROCARDIOGRAM TRACING: CPT

## 2019-08-08 PROCEDURE — 89060 EXAM SYNOVIAL FLUID CRYSTALS: CPT

## 2019-08-08 PROCEDURE — 89051 BODY FLUID CELL COUNT: CPT

## 2019-08-08 PROCEDURE — 73130 X-RAY EXAM OF HAND: CPT

## 2019-08-08 PROCEDURE — 99285 EMERGENCY DEPT VISIT HI MDM: CPT | Mod: 25

## 2019-08-08 PROCEDURE — 80053 COMPREHEN METABOLIC PANEL: CPT

## 2019-08-08 PROCEDURE — 84100 ASSAY OF PHOSPHORUS: CPT

## 2019-08-08 PROCEDURE — 80048 BASIC METABOLIC PNL TOTAL CA: CPT

## 2019-08-08 PROCEDURE — 82330 ASSAY OF CALCIUM: CPT

## 2019-08-08 PROCEDURE — 87186 SC STD MICRODIL/AGAR DIL: CPT

## 2019-08-08 PROCEDURE — 99239 HOSP IP/OBS DSCHRG MGMT >30: CPT

## 2019-08-08 PROCEDURE — 85652 RBC SED RATE AUTOMATED: CPT

## 2019-08-08 PROCEDURE — 82803 BLOOD GASES ANY COMBINATION: CPT

## 2019-08-08 PROCEDURE — 71045 X-RAY EXAM CHEST 1 VIEW: CPT

## 2019-08-08 PROCEDURE — 86140 C-REACTIVE PROTEIN: CPT

## 2019-08-08 PROCEDURE — 85014 HEMATOCRIT: CPT

## 2019-08-08 PROCEDURE — 84295 ASSAY OF SERUM SODIUM: CPT

## 2019-08-08 PROCEDURE — 96375 TX/PRO/DX INJ NEW DRUG ADDON: CPT

## 2019-08-08 PROCEDURE — 84550 ASSAY OF BLOOD/URIC ACID: CPT

## 2019-08-08 PROCEDURE — 82947 ASSAY GLUCOSE BLOOD QUANT: CPT

## 2019-08-08 PROCEDURE — 85730 THROMBOPLASTIN TIME PARTIAL: CPT

## 2019-08-08 PROCEDURE — 85027 COMPLETE CBC AUTOMATED: CPT

## 2019-08-08 PROCEDURE — 87086 URINE CULTURE/COLONY COUNT: CPT

## 2019-08-08 PROCEDURE — 83735 ASSAY OF MAGNESIUM: CPT

## 2019-08-08 PROCEDURE — 87040 BLOOD CULTURE FOR BACTERIA: CPT

## 2019-08-08 PROCEDURE — 85610 PROTHROMBIN TIME: CPT

## 2019-08-08 PROCEDURE — 87205 SMEAR GRAM STAIN: CPT

## 2019-08-08 RX ORDER — TRAZODONE HCL 50 MG
1 TABLET ORAL
Qty: 0 | Refills: 0 | DISCHARGE
Start: 2019-08-08

## 2019-08-08 RX ORDER — COLCHICINE 0.6 MG
1 TABLET ORAL
Qty: 0 | Refills: 0 | DISCHARGE
Start: 2019-08-08

## 2019-08-08 RX ORDER — COLCHICINE 0.6 MG
1 TABLET ORAL
Qty: 30 | Refills: 0
Start: 2019-08-08 | End: 2019-09-06

## 2019-08-08 RX ORDER — LANOLIN ALCOHOL/MO/W.PET/CERES
1 CREAM (GRAM) TOPICAL
Qty: 0 | Refills: 0 | DISCHARGE
Start: 2019-08-08

## 2019-08-08 RX ADMIN — Medication 1 DROP(S): at 06:15

## 2019-08-08 RX ADMIN — MEROPENEM 100 MILLIGRAM(S): 1 INJECTION INTRAVENOUS at 11:03

## 2019-08-08 RX ADMIN — Medication 1 DROP(S): at 06:14

## 2019-08-08 RX ADMIN — HEPARIN SODIUM 5000 UNIT(S): 5000 INJECTION INTRAVENOUS; SUBCUTANEOUS at 06:14

## 2019-08-08 RX ADMIN — Medication 1 DROP(S): at 13:10

## 2019-08-08 RX ADMIN — HEPARIN SODIUM 5000 UNIT(S): 5000 INJECTION INTRAVENOUS; SUBCUTANEOUS at 13:11

## 2019-08-08 RX ADMIN — Medication 0.6 MILLIGRAM(S): at 11:02

## 2019-08-08 NOTE — PROGRESS NOTE ADULT - PROBLEM SELECTOR PLAN 7
DVT ppx: Hep sc q8  Diet: regular  Dispo: discharge to rehab, then long term care facility DVT ppx: Hep sc q8  Diet: regular  Dispo: discharge to rehab, then long term care facility, TODAY at 2 pm

## 2019-08-08 NOTE — PROGRESS NOTE ADULT - PROBLEM SELECTOR PLAN 2
Difficult to elicit symptoms; patient does not endorse dysuria. No fever or leukocytosis. ESBL Klebsiella pneumoniae on urine culture.  - treat w/ meropenem 1 g q12h, day 3 of 3

## 2019-08-08 NOTE — DISCHARGE NOTE NURSING/CASE MANAGEMENT/SOCIAL WORK - NSDCFUADDAPPT_GEN_ALL_CORE_FT
Please follow up with Rheumatology, Dr. Radha Nassar, in 4-6 weeks. The office will be reaching out to you to set up this appointment.  Please follow up with your primary care physician.

## 2019-08-08 NOTE — PROGRESS NOTE ADULT - ASSESSMENT
89 y/o female PMHx HLD, mild dementia, R cataract c/b corneal injury (blindness), ?prior subclinical L basal ganglia CVA p/w bilateral wrist pain, erythema and swelling in setting of fever concerning for pseudogout flare, now s/p aspiration which showed many nucleated cells and some calcium pyrophosphate crystals. Hospitalization complicated by finding of ESBL Klebsiella pneumoniae on urine cx, treated with three days of meropenem.

## 2019-08-08 NOTE — PROGRESS NOTE ADULT - PROBLEM SELECTOR PLAN 1
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 Recent hosp w/ similar sxs; thought to be CPPD; improved w/ ceftriaxone/vanc/2wk pred taper. Aspiration showed [id number] nucleated cells with rare CPP crystals, pink, Gram stain showed no organisms, awaiting cultures, CRP 94.  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 Recent hosp w/ similar sxs; thought to be CPPD; improved w/ ceftriaxone/vanc/2wk pred taper. Aspiration showed [id number] nucleated cells with rare CPP crystals, pink, Gram stain showed no organisms, awaiting cultures, CRP 94.  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 Recent hosp w/ similar sxs; thought to be CPPD; improved w/ ceftriaxone/vanc/2wk pred taper. Aspiration showed [id number] nucleated cells with rare CPP crystals, pink, Gram stain showed no organisms, awaiting cultures, CRP 94.  - colchicine 0.6 mg qd, acetaminophen for pain  - blood cx and joint aspiration cx both show NGTD  - per rheumatology, will continue colchicine 0.6 mg qd for one month and follow up at the rheumatology office in 4-6 weeks  - appreciate rheumatology recommendations  - cold compress b/l wrists

## 2019-08-08 NOTE — PROGRESS NOTE ADULT - PROBLEM SELECTOR PLAN 6
Spoke with daughter on 8/6.  - patient is FULL CODE
Spoke with daughter on 8/6.  - patient is FULL CODE

## 2019-08-08 NOTE — PROGRESS NOTE ADULT - REASON FOR ADMISSION
Wrist swelling

## 2019-08-08 NOTE — PROGRESS NOTE ADULT - ATTENDING COMMENTS
Dispo to rehab today. Daughter aware. Discharge Planning 40 minutes
Patient seen and examined. Right wrist +swollen, warmth. Will continue abx, consult rheum and ID as well as patient had similar presentation last time w/ 4 week course of abx and steroids.    Flaca Lang  Hospitalist Attending
Patient pending dispo, PT pending    Flaca Lang  [phone]  Hospitalist Attending
discussed with daughter. As per daughter, patient living at home, during her prior admission, patient was discharged to rehab and then home. As per daughter, difficult to take care of her at home. She has had some short term memory problems for years, and worsened with multiple hospitalizations. Daughter interested in LTC/NH. Will first get PT to assess. CM and team aware.   Will give short course of abx    Flaca Lang  [phone]  Hospitalist Attending

## 2019-08-08 NOTE — DISCHARGE NOTE NURSING/CASE MANAGEMENT/SOCIAL WORK - NSDCDPATPORTLINK_GEN_ALL_CORE
You can access the [facility], offered by [facility], by registering with the following website: [email or web address]

## 2019-08-08 NOTE — PROGRESS NOTE ADULT - SUBJECTIVE AND OBJECTIVE BOX
THIS NOTE IS INCOMPLETE    Dru Manrique MD  Beeper: [phone]    Subjective:    Patient is a 89 year old female who presents with a chief complaint of wrist swelling.    Overnight events:    MEDICATIONS  (STANDING):  artificial tears (preservative free) Ophthalmic Solution 1 Drop(s) Both EYES three times a day  colchicine 0.6 milliGRAM(s) Oral daily  heparin  Injectable 5000 Unit(s) SubCutaneous every 8 hours  melatonin 5 milliGRAM(s) Oral at bedtime  meropenem  IVPB 1000 milliGRAM(s) IV Intermittent every 12 hours  prednisoLONE acetate 1% Suspension 1 Drop(s) Right EYE two times a day  simvastatin 10 milliGRAM(s) Oral at bedtime  sodium chloride 0.9% 1000 milliLiter(s) (50 mL/Hr) IV Continuous <Continuous>    MEDICATIONS  (PRN):  acetaminophen   Tablet .. 975 milliGRAM(s) Oral every 6 hours PRN Mild Pain (1 - 3), Moderate Pain (4 - 6)  traZODone 50 milliGRAM(s) Oral daily PRN agitation    Objective:    Vitals:   T(F): 97.8 (08-08-19 @ 05:57), Max: 97.8 (08-07-19 @ 21:00)  HR: 81 (08-08-19 @ 05:57) (72 - 86)  BP: 131/76 (08-08-19 @ 05:57) (131/76 - 154/81)  RR: 18 (08-08-19 @ 05:57) (18 - 18)  SpO2: 95% (08-08-19 @ 05:57) (93% - 96%)                PHYSICAL EXAM:  GENERAL: NAD, well-groomed, well-developed  HEAD:  Atraumatic, Normocephalic  EYES: EOMI, PERRLA, conjunctiva and sclera clear  ENMT: No tonsillar erythema, exudates, or enlargement; Moist mucous membranes, Good dentition, No lesions  NECK: Supple, No JVD, Normal thyroid  CHEST/LUNG: Clear to percussion bilaterally; No rales, rhonchi, wheezing, or rubs  HEART: Regular rate and rhythm; No murmurs, rubs, or gallops  ABDOMEN: Soft, Nontender, Nondistended; Bowel sounds present  EXTREMITIES:  2+ Peripheral Pulses, No clubbing, cyanosis, or edema  LYMPH: No lymphadenopathy noted  SKIN: No rashes or lesions  NERVOUS SYSTEM:     LABS:  No labs today.    NGTD on blood and synovial fluid cultures.   ESBL Klebsiella pneumoniae on urine culture. THIS NOTE IS INCOMPLETE    Dru Manrique MD  Beeper: [phone]    Subjective:    Patient is a 89 year old female who presents with a chief complaint of wrist swelling.    Overnight events: No acute events overnight.    Patient feels good today. No pain in her hands or wrists, or anywhere else. She slept well overnight.     MEDICATIONS  (STANDING):  artificial tears (preservative free) Ophthalmic Solution 1 Drop(s) Both EYES three times a day  colchicine 0.6 milliGRAM(s) Oral daily  heparin  Injectable 5000 Unit(s) SubCutaneous every 8 hours  melatonin 5 milliGRAM(s) Oral at bedtime  meropenem  IVPB 1000 milliGRAM(s) IV Intermittent every 12 hours  prednisoLONE acetate 1% Suspension 1 Drop(s) Right EYE two times a day  simvastatin 10 milliGRAM(s) Oral at bedtime  sodium chloride 0.9% 1000 milliLiter(s) (50 mL/Hr) IV Continuous <Continuous>    MEDICATIONS  (PRN):  acetaminophen   Tablet .. 975 milliGRAM(s) Oral every 6 hours PRN Mild Pain (1 - 3), Moderate Pain (4 - 6)  traZODone 50 milliGRAM(s) Oral daily PRN agitation    Objective:    Vitals:   T(F): 97.8 (08-08-19 @ 05:57), Max: 97.8 (08-07-19 @ 21:00)  HR: 81 (08-08-19 @ 05:57) (72 - 86)  BP: 131/76 (08-08-19 @ 05:57) (131/76 - 154/81)  RR: 18 (08-08-19 @ 05:57) (18 - 18)  SpO2: 95% (08-08-19 @ 05:57) (93% - 96%)                PHYSICAL EXAM:  GENERAL: NAD, well-groomed, well-developed  HEAD:  atraumatic, normocephalic  ENMT: moist mucous membranes  CHEST/LUNG: clear to auscultation bilaterally; no rales, rhonchi, wheezing, or rubs  HEART: regular rate and rhythm; no murmurs, rubs, or gallops  EXTREMITIES: swollen node on left wrist, dorsal aspect, thumb side, improved from before; lesser swelling on right wrist, also improved  NERVOUS SYSTEM: alert and oriented to name    LABS:  No labs today.    NGTD on blood and synovial fluid cultures.   ESBL Klebsiella pneumoniae on urine culture. THIS NOTE IS INCOMPLETE    Dru Manrique MD  Beeper: [phone]    Subjective:    Patient is a 89 year old female who presents with a chief complaint of wrist swelling.    Overnight events: No acute events overnight.    Patient feels good today. No pain in her hands or wrists, or anywhere else. She slept well overnight. Reports no urinary symptoms.    MEDICATIONS  (STANDING):  artificial tears (preservative free) Ophthalmic Solution 1 Drop(s) Both EYES three times a day  colchicine 0.6 milliGRAM(s) Oral daily  heparin  Injectable 5000 Unit(s) SubCutaneous every 8 hours  melatonin 5 milliGRAM(s) Oral at bedtime  meropenem  IVPB 1000 milliGRAM(s) IV Intermittent every 12 hours  prednisoLONE acetate 1% Suspension 1 Drop(s) Right EYE two times a day  simvastatin 10 milliGRAM(s) Oral at bedtime  sodium chloride 0.9% 1000 milliLiter(s) (50 mL/Hr) IV Continuous <Continuous>    MEDICATIONS  (PRN):  acetaminophen   Tablet .. 975 milliGRAM(s) Oral every 6 hours PRN Mild Pain (1 - 3), Moderate Pain (4 - 6)  traZODone 50 milliGRAM(s) Oral daily PRN agitation    Objective:    Vitals:   T(F): 97.8 (08-08-19 @ 05:57), Max: 97.8 (08-07-19 @ 21:00)  HR: 81 (08-08-19 @ 05:57) (72 - 86)  BP: 131/76 (08-08-19 @ 05:57) (131/76 - 154/81)  RR: 18 (08-08-19 @ 05:57) (18 - 18)  SpO2: 95% (08-08-19 @ 05:57) (93% - 96%)                PHYSICAL EXAM:  GENERAL: NAD, well-groomed, well-developed  HEAD:  atraumatic, normocephalic  ENMT: moist mucous membranes  CHEST/LUNG: clear to auscultation bilaterally; no rales, rhonchi, wheezing, or rubs  HEART: regular rate and rhythm; no murmurs, rubs, or gallops  EXTREMITIES: swollen node on left wrist, dorsal aspect, thumb side, improved from before; lesser swelling on right wrist, also improved  NERVOUS SYSTEM: alert and oriented to name    LABS:  No labs today.    NGTD on blood and synovial fluid cultures.   ESBL Klebsiella pneumoniae on urine culture. Dru Manrique MD  Beeper: [phone]    Subjective:    Patient is a 89 year old female who presents with a chief complaint of wrist swelling.    Overnight events: No acute events overnight.    Patient feels good today. No pain in her hands or wrists, or anywhere else. She slept well overnight. Reports no urinary symptoms.    MEDICATIONS  (STANDING):  artificial tears (preservative free) Ophthalmic Solution 1 Drop(s) Both EYES three times a day  colchicine 0.6 milliGRAM(s) Oral daily  heparin  Injectable 5000 Unit(s) SubCutaneous every 8 hours  melatonin 5 milliGRAM(s) Oral at bedtime  meropenem  IVPB 1000 milliGRAM(s) IV Intermittent every 12 hours  prednisoLONE acetate 1% Suspension 1 Drop(s) Right EYE two times a day  simvastatin 10 milliGRAM(s) Oral at bedtime  sodium chloride 0.9% 1000 milliLiter(s) (50 mL/Hr) IV Continuous <Continuous>    MEDICATIONS  (PRN):  acetaminophen   Tablet .. 975 milliGRAM(s) Oral every 6 hours PRN Mild Pain (1 - 3), Moderate Pain (4 - 6)  traZODone 50 milliGRAM(s) Oral daily PRN agitation    Objective:    Vitals:   T(F): 97.8 (08-08-19 @ 05:57), Max: 97.8 (08-07-19 @ 21:00)  HR: 81 (08-08-19 @ 05:57) (72 - 86)  BP: 131/76 (08-08-19 @ 05:57) (131/76 - 154/81)  RR: 18 (08-08-19 @ 05:57) (18 - 18)  SpO2: 95% (08-08-19 @ 05:57) (93% - 96%)                PHYSICAL EXAM:  GENERAL: NAD, well-groomed, well-developed  HEAD:  atraumatic, normocephalic  ENMT: moist mucous membranes  CHEST/LUNG: clear to auscultation bilaterally; no rales, rhonchi, wheezing, or rubs  HEART: regular rate and rhythm; no murmurs, rubs, or gallops  EXTREMITIES: swollen node on left wrist, dorsal aspect, thumb side, improved from before; lesser swelling on right wrist, also improved  NERVOUS SYSTEM: alert and oriented to name    LABS:  No labs today.    NGTD on blood and synovial fluid cultures.   ESBL Klebsiella pneumoniae on urine culture.

## 2019-08-08 NOTE — PROGRESS NOTE ADULT - PROBLEM SELECTOR PLAN 4
Patient takes prednisolone 1% eye drops (has R cataract c/b corneal injury).  - added artificial tears Patient takes prednisolone 1% eye drops (has R cataract c/b corneal injury).  - artificial tears

## 2019-08-08 NOTE — PROGRESS NOTE ADULT - PROVIDER SPECIALTY LIST ADULT
Internal Medicine
Plastic Surgery
Infectious Disease
Rheumatology
Rheumatology
Internal Medicine

## 2019-08-09 LAB
CULTURE RESULTS: SIGNIFICANT CHANGE UP
CULTURE RESULTS: SIGNIFICANT CHANGE UP
SPECIMEN SOURCE: SIGNIFICANT CHANGE UP
SPECIMEN SOURCE: SIGNIFICANT CHANGE UP

## 2019-08-09 NOTE — DISCUSSION/SUMMARY
[Other: _____] : patient was discharged to  [FreeTextEntry1] : According to daughter Cathryn, pt is in nursing home post d/c from hospital and might be there permanently. Informed she can f/u with us if needed. pcp notified.

## 2019-08-18 LAB
CULTURE RESULTS: SIGNIFICANT CHANGE UP
SPECIMEN SOURCE: SIGNIFICANT CHANGE UP

## 2019-08-19 ENCOUNTER — INBOUND DOCUMENT (OUTPATIENT)
Age: 84
End: 2019-08-19

## 2019-11-14 ENCOUNTER — APPOINTMENT (OUTPATIENT)
Dept: INTERNAL MEDICINE | Facility: CLINIC | Age: 84
End: 2019-11-14

## 2020-08-31 NOTE — PATIENT PROFILE ADULT - IS PATIENT POST-MENOPAUSAL?
Please see [employer] message and advise if appointment is needed. Patient would like to begin immunotherapy.     Jeanie Mcnally RN     yes

## 2020-12-03 NOTE — OCCUPATIONAL THERAPY INITIAL EVALUATION ADULT - PLANNED THERAPY INTERVENTIONS, OT EVAL
[email or web address] joint mobilization/ROM/transfer training/bed mobility training/strengthening/ADL retraining/balance training/motor coordination training/cognitive, visual perceptual

## 2021-04-06 NOTE — CHART NOTE - NSCHARTNOTEFT_GEN_A_CORE
Vanco Trough: 23.7. Per RN,  Vancomycin was infusing, Pt had already received 20cc of abx when trough was drawn. SAE, Electrolyte abnormalities SAE, Electrolyte abnormalities SAE, Electrolyte abnormalities

## 2023-03-20 NOTE — ED PROVIDER NOTE - CONSTITUTIONAL DEVELOPMENT, MLM
Pt resting in bed with even and unlabored respirations. Sitter at bedside.      Atul Blankenship RN  03/20/23 3271 well developed

## 2023-09-08 NOTE — PATIENT PROFILE ADULT - NSPROPTRIGHTCAREGIVER_GEN_A_NUR
Pt's , Faye Andersen, called. Stated that pt began with right foot pain a few days ago. The pain is isolated to the top of her foot. Foot is swollen. No open wounds. Has pain at rest and while ambulating. No known injury. Normal in temperature and color. Pt is leaving to go on a trip tomorrow and will not be back until 9/16/23. Faye Andersen was asking if there is something that pt can put on her foot for pain. Advised that we would need to find out what the cause of the pain is first. Offered an OV for today at [facility] at 10:30 am. Faye Andersen declined, stating they would not make it on time. Asked Faye Andersen if he contacted pt's PCP, and he stated that he did, but was told to contact vascular. Advised that pt could be seen in the ED for an evaluation.  Faye Andersen stated that he would try to talk to pt regarding an ED eval. information could not be obtained

## 2024-03-12 NOTE — PATIENT PROFILE ADULT - NSASFALLATTEMPTOOB_GEN_A_NUR
----- Message from Tr Bailey MD sent at 3/12/2024  9:46 AM CDT -----  Regarding: Order for MERLINE HIRSCH    Patient Name: MERLINE HIRSCH([id number])  Sex: Female  : 1979      PCP: MERLINE ALVAREZ    Center: [facility] CENTRAL BILLING OFFICE     Types of orders made on 2024: Procedure Request    Order Date:3/12/2024  Ordering User:TR BAILEY [[id number]  Encounter Provider:Tr Bailey MD [[id number]]    Z1 Authorizing Provider: Tr Bailey MD [[id number]]  Department:[facility][[id number]]    Common Order Information  Procedure -> Epidural Injection (specify level) Cmt: C7/T1    Order Specific Information  Order: Procedure Order to Pain Management [Custom: [id number]]  Order #:          [id number]: 1 FUTURE    Priority: Routine  Class: Clinic Performed    Future Order Information      Ex  yessica on:2025            Expected by:2024                   Comment:Please schedule with 1st available provider    Associated Diagnoses      M54.12 Cervical radiculopathy      M50.30 DDD (degenerative disc disease), cervical      Physician -> bailey         Is patient on anti-coagulants? -> No         Facility Name: -> [facility]           Priority: Routine  Class: Clinic Performed    Future Order Info  rmation      Expires on:2025            Expected by:2024                   Comment:Please schedule with 1st available provider    Associated Diagnoses      M54.12 Cervical radiculopathy      M50.30 DDD (degenerative disc disease), cervical      Procedure -> Epidural Injection (specify level) Cmt: C7/T1        Physician -> bailey         Is patient on anti-coagulants? -> No         Facility Name: -> [facility]           
no

## 2024-05-29 NOTE — DISCHARGE NOTE NURSING/CASE MANAGEMENT/SOCIAL WORK - NSTRANSFERBELONGINGSDISPO_GEN_A_NUR
"Ambulatory Visit  Name: Cortney Wayne      : 1968      MRN: [id number]  Encounter Provider: Aren Penn MD  Encounter Date: 2024   Encounter department: [facility] GYNECOLOGY [place]    Assessment & Plan     Normal breast and GYN exam  LEEP 2021 for high-grade ADRIEN  Menopause symptoms including hot flashes and night sweats  Status postcholecystectomy     Plan: Information on herbal supplements for menopause symptoms given.  None hormonal medication for menopause symptoms discussed as well as estrogen.  Recommend healthy diet and exercise.    1. HSIL (high grade squamous intraepithelial lesion) on Pap smear of cervix  -     GP PAP (RFLX HPV Plus whenASC-US)  2. History of loop electrical excision procedure (LEEP)  -     GP PAP (RFLX HPV Plus whenASC-US)  3. Screening mammogram for breast cancer  -     Mammo screening bilateral w 3d & cad; Future; Expected date: 2025  4. Screening for colorectal cancer  -     Ambulatory Referral to Gastroenterology; Future      History of Present Illness     Cortney Wayne is a 56 y.o. female who presents to the office for annual exam with no complaints except hot flashes and night sweats.  Presently not interested in estrogen gestrinone.  Would like to try herbal supplements first.  Denies any vaginal bleeding pelvic pain or dyspareunia.  History of a LEEP 2021.  Never returned for follow-up Pap smears.  Denies any breast bowel or bladder problems.  Has not had a colonoscopy or mammogram.  Recommended both and requests were given.  Working full-time for an insurance company.  No change in family history.  Medications reviewed.  Had her gallbladder removed 2023.    Objective     /62   Ht 5' 2\" (1.575 m)   Wt 56.7 kg (125 lb)   LMP  (LMP Unknown)   BMI 22.86 kg/m²     Physical Exam  Vitals and nursing note reviewed.   Constitutional:       General: She is not in acute distress.     Appearance: She is well-developed. "   HENT:      Head: Normocephalic and atraumatic.   Eyes:      Conjunctiva/sclera: Conjunctivae normal.   Cardiovascular:      Rate and Rhythm: Normal rate and regular rhythm.      Heart sounds: No murmur heard.  Pulmonary:      Effort: Pulmonary effort is normal. No respiratory distress.      Breath sounds: Normal breath sounds.   Abdominal:      Palpations: Abdomen is soft.      Tenderness: There is no abdominal tenderness.   Genitourinary:     Vagina: Normal.      Cervix: Normal.      Uterus: Normal.       Adnexa: Right adnexa normal and left adnexa normal.      Comments: Genitalia normal.  Musculoskeletal:         General: No swelling.      Cervical back: Neck supple.   Skin:     General: Skin is warm and dry.      Capillary Refill: Capillary refill takes less than 2 seconds.   Neurological:      Mental Status: She is alert.   Psychiatric:         Mood and Affect: Mood normal.       Administrative Statements            not applicable

## 2025-02-12 NOTE — PROGRESS NOTE ADULT - SUBJECTIVE AND OBJECTIVE BOX
Emergency Department Discharge Information for Winston Montero was seen in the Emergency Department today for fever and sore throat.    He tested positive for strep throat.     We recommend that you take the amoxicillin and encourage lots of fluids.      For fever or pain, Winston can have:    Acetaminophen (Tylenol) every 4 to 6 hours as needed (up to 5 doses in 24 hours). His dose is: 7.5 ml (240 mg) of the infant's or children's liquid            (16.4-21.7 kg//36-47 lb)     Or    Ibuprofen (Advil, Motrin) every 6 hours as needed. His dose is:   7.5 ml (150 mg) of the children's (not infant's) liquid                                             (15-20 kg/33-44 lb)    If necessary, it is safe to give both Tylenol and ibuprofen, as long as you are careful not to give Tylenol more than every 4 hours or ibuprofen more than every 6 hours.    These doses are based on your child s weight. If you have a prescription for these medicines, the dose may be a little different. Either dose is safe. If you have questions, ask a doctor or pharmacist.     Please return to the ED or contact his regular clinic if:     he becomes much more ill  he has trouble breathing  he can't keep down liquids  he has severe pain   or you have any other concerns.      Please make an appointment to follow up with his primary care provider or regular clinic  as needed.     PEDRO BYRD  [id number]    INTERVAL HPI/OVERNIGHT EVENTS:    Patient with no acute overnight events. Continues to get antibiotics.    MEDICATIONS  (STANDING):  cefTRIAXone   IVPB 1000 milliGRAM(s) IV Intermittent every 24 hours  heparin  Injectable 5000 Unit(s) SubCutaneous every 12 hours  prednisoLONE acetate 1% Suspension 1 Drop(s) Right EYE every 12 hours  simvastatin 10 milliGRAM(s) Oral at bedtime  vancomycin  IVPB 1000 milliGRAM(s) IV Intermittent daily    MEDICATIONS  (PRN):  acetaminophen   Tablet .. 650 milliGRAM(s) Oral every 6 hours PRN Mild Pain (1 - 3), Moderate Pain (4 - 6)  QUEtiapine 12.5 milliGRAM(s) Oral every 12 hours PRN agitation      Allergies    No Known Allergies    Intolerances    penicillin (Stomach Upset)      Review of Systems:   +joint pain      Vital Signs Last 24 Hrs  T(C): 36.9 (22 Jun 2019 13:10), Max: 36.9 (22 Jun 2019 13:10)  T(F): 98.4 (22 Jun 2019 13:10), Max: 98.4 (22 Jun 2019 13:10)  HR: 86 (22 Jun 2019 13:10) (69 - 89)  BP: 169/79 (22 Jun 2019 13:10) (147/73 - 174/72)  BP(mean): --  RR: 18 (22 Jun 2019 13:10) (17 - 18)  SpO2: 94% (22 Jun 2019 13:10) (92% - 98%)    Physical Exam:  General: NAD  MSK: R wrist with swelling, particularly over the palmar aspect, limited ROM, warmth, erythema, but all improved from prior exam. also with erythema and effusion of R elbow  Neuro: alert  Skin: erythema over the R wrist      LABS:                        14.6   9.33  )-----------( 180      ( 22 Jun 2019 11:25 )             44.0     06-22    131<L>  |  92<L>  |  12  ----------------------------<  115<H>  3.8   |  28  |  0.42<L>    Ca    9.2      22 Jun 2019 06:47  Phos  2.6     06-21  Mg     1.8     06-21              RADIOLOGY & ADDITIONAL TESTS:    EXAM:  WRIST COMPLETE RIGHT-MIN 3 VIEWS                            PROCEDURE DATE:  06/20/2019            INTERPRETATION:  CLINICAL INDICATION: Erythema and swelling along the   dorsal wrist. Status post fall.    EXAM: PA, lateral and oblique viewsof the right wrist    COMPARISON: None      IMPRESSION:   The bones are diffusely demineralized. No discrete fracture is   visualized. There is widening of the scapholunate ligament, consistent   with ligamentous insufficiency-prior injury. There is chondrocalcinosis.   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 There is soft tissue   swelling about the carpus.    EXAM:  MR WRIST RT                            PROCEDURE DATE:  06/21/2019            INTERPRETATION:  Clinical Information: Right wrist swelling, low-grade   fever, concern for joint abscess.    Comparison: None.    Technique: MRI of the wrist was performed utilizing multiplanar imaging   without the administration of intravenous contrast. The study was   terminated prematurely as the patient was unable to tolerate the   examination.     Findings:     Evaluation is markedly limited due to an incomplete study, motion   artifact and inhomogeneous fat saturation. There is a partially included   marked flexor digitorum tenosynovitis most prominent at the level of the   distal radius and ulna. There is moderate flexor carpi radialis   tenosynovitis with a suggestion of septations. There is mild abductor   pollicis longus tenosynovitis. There is a small distal radioulnar joint   effusion. There is likely a radiocarpal joint effusion with synovitis   although the radiocarpal region was not included on the sagittal   sequence. There is diffuse soft tissue edema and subcutaneous edema.   There is a suggestion of hypointense marrow signal within the scaphoid on   the T1-weighted sequence and there are rounded foci of marrow signal   abnormality throughout the visualized osseous structures which are not   well evaluated on this incomplete study.    Impression:   Markedly limited incomplete evaluation.  Partially included marked flexor digitorum tenosynovitis, moderate flexor   carpi radialis tenosynovitis and mild abductor pollicis longus   tenosynovitis. Small distal radioulnar joint effusion and radiocarpal   joint effusion with synovitis. Diffuse soft tissue edema and subcutaneous   edema. These findings are likely infectious given the clinical history.   Nonspecific marrow signal abnormality which is incompletely evaluated on   this study; osteomyelitis remains in the differential. PEDRO BYRD  [id number]    INTERVAL HPI/OVERNIGHT EVENTS:    Patient with no acute overnight events. Continues to get antibiotics.   Says the left toe hurts.    MEDICATIONS  (STANDING):  cefTRIAXone   IVPB 1000 milliGRAM(s) IV Intermittent every 24 hours  heparin  Injectable 5000 Unit(s) SubCutaneous every 12 hours  prednisoLONE acetate 1% Suspension 1 Drop(s) Right EYE every 12 hours  simvastatin 10 milliGRAM(s) Oral at bedtime  vancomycin  IVPB 1000 milliGRAM(s) IV Intermittent daily    MEDICATIONS  (PRN):  acetaminophen   Tablet .. 650 milliGRAM(s) Oral every 6 hours PRN Mild Pain (1 - 3), Moderate Pain (4 - 6)  QUEtiapine 12.5 milliGRAM(s) Oral every 12 hours PRN agitation      Allergies    No Known Allergies    Intolerances    penicillin (Stomach Upset)      Review of Systems:   +joint pain per hpi      Vital Signs Last 24 Hrs  T(C): 36.9 (22 Jun 2019 13:10), Max: 36.9 (22 Jun 2019 13:10)  T(F): 98.4 (22 Jun 2019 13:10), Max: 98.4 (22 Jun 2019 13:10)  HR: 86 (22 Jun 2019 13:10) (69 - 89)  BP: 169/79 (22 Jun 2019 13:10) (147/73 - 174/72)  BP(mean): --  RR: 18 (22 Jun 2019 13:10) (17 - 18)  SpO2: 94% (22 Jun 2019 13:10) (92% - 98%)    Physical Exam:  General: NAD - Awake   MSK: R wrist with swelling as well as edema, limited ROM, warmth, erythema, but all improved from prior exam. also with erythema, warmth and effusion of R elbow.  R elbow extension to 30 degrees (left elbow full).  left MTP joint ttp but without synovitis on exam  Neuro: alert  Skin: erythema over the R wrist and elbow      LABS:                        14.6   9.33  )-----------( 180      ( 22 Jun 2019 11:25 )             44.0     06-22    131<L>  |  92<L>  |  12  ----------------------------<  115<H>  3.8   |  28  |  0.42<L>    Ca    9.2      22 Jun 2019 06:47  Phos  2.6     06-21  Mg     1.8     06-21              RADIOLOGY & ADDITIONAL TESTS:    EXAM:  WRIST COMPLETE RIGHT-MIN 3 VIEWS                            PROCEDURE DATE:  06/20/2019            INTERPRETATION:  CLINICAL INDICATION: Erythema and swelling along the   dorsal wrist. Status post fall.    EXAM: PA, lateral and oblique viewsof the right wrist    COMPARISON: None      IMPRESSION:   The bones are diffusely demineralized. No discrete fracture is   visualized. There is widening of the scapholunate ligament, consistent   with ligamentous insufficiency-prior injury. There is chondrocalcinosis.   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 There is soft tissue   swelling about the carpus.    EXAM:  MR WRIST RT                            PROCEDURE DATE:  06/21/2019            INTERPRETATION:  Clinical Information: Right wrist swelling, low-grade   fever, concern for joint abscess.    Comparison: None.    Technique: MRI of the wrist was performed utilizing multiplanar imaging   without the administration of intravenous contrast. The study was   terminated prematurely as the patient was unable to tolerate the   examination.     Findings:     Evaluation is markedly limited due to an incomplete study, motion   artifact and inhomogeneous fat saturation. There is a partially included   marked flexor digitorum tenosynovitis most prominent at the level of the   distal radius and ulna. There is moderate flexor carpi radialis   tenosynovitis with a suggestion of septations. There is mild abductor   pollicis longus tenosynovitis. There is a small distal radioulnar joint   effusion. There is likely a radiocarpal joint effusion with synovitis   although the radiocarpal region was not included on the sagittal   sequence. There is diffuse soft tissue edema and subcutaneous edema.   There is a suggestion of hypointense marrow signal within the scaphoid on   the T1-weighted sequence and there are rounded foci of marrow signal   abnormality throughout the visualized osseous structures which are not   well evaluated on this incomplete study.    Impression:   Markedly limited incomplete evaluation.  Partially included marked flexor digitorum tenosynovitis, moderate flexor   carpi radialis tenosynovitis and mild abductor pollicis longus   tenosynovitis. Small distal radioulnar joint effusion and radiocarpal   joint effusion with synovitis. Diffuse soft tissue edema and subcutaneous   edema. These findings are likely infectious given the clinical history.   Nonspecific marrow signal abnormality which is incompletely evaluated on   this study; osteomyelitis remains in the differential.